# Patient Record
Sex: FEMALE | Race: WHITE | Employment: PART TIME | URBAN - NONMETROPOLITAN AREA
[De-identification: names, ages, dates, MRNs, and addresses within clinical notes are randomized per-mention and may not be internally consistent; named-entity substitution may affect disease eponyms.]

---

## 2019-05-02 ENCOUNTER — TELEPHONE (OUTPATIENT)
Dept: CARDIOLOGY CLINIC | Age: 39
End: 2019-05-02

## 2019-05-07 ENCOUNTER — OFFICE VISIT (OUTPATIENT)
Dept: CARDIOLOGY CLINIC | Age: 39
End: 2019-05-07
Payer: COMMERCIAL

## 2019-05-07 ENCOUNTER — HOSPITAL ENCOUNTER (OUTPATIENT)
Dept: NON INVASIVE DIAGNOSTICS | Age: 39
Discharge: HOME | End: 2019-05-07
Payer: COMMERCIAL

## 2019-05-07 VITALS — DIASTOLIC BLOOD PRESSURE: 85 MMHG | HEART RATE: 71 BPM | WEIGHT: 154.8 LBS | SYSTOLIC BLOOD PRESSURE: 119 MMHG

## 2019-05-07 DIAGNOSIS — R42 DIZZINESS: ICD-10-CM

## 2019-05-07 DIAGNOSIS — R55 SYNCOPE AND COLLAPSE: ICD-10-CM

## 2019-05-07 DIAGNOSIS — R00.2 INTERMITTENT PALPITATIONS: ICD-10-CM

## 2019-05-07 DIAGNOSIS — R55 SYNCOPE AND COLLAPSE: Primary | ICD-10-CM

## 2019-05-07 PROCEDURE — 93270 REMOTE 30 DAY ECG REV/REPORT: CPT

## 2019-05-07 PROCEDURE — G8421 BMI NOT CALCULATED: HCPCS | Performed by: INTERNAL MEDICINE

## 2019-05-07 PROCEDURE — 99204 OFFICE O/P NEW MOD 45 MIN: CPT | Performed by: INTERNAL MEDICINE

## 2019-05-07 PROCEDURE — 1036F TOBACCO NON-USER: CPT | Performed by: INTERNAL MEDICINE

## 2019-05-07 PROCEDURE — G8427 DOCREV CUR MEDS BY ELIG CLIN: HCPCS | Performed by: INTERNAL MEDICINE

## 2019-05-07 RX ORDER — FLUTICASONE PROPIONATE 220 UG/1
1 AEROSOL, METERED RESPIRATORY (INHALATION) 2 TIMES DAILY
COMMUNITY
End: 2022-03-09 | Stop reason: ALTCHOICE

## 2019-05-07 RX ORDER — ACETAMINOPHEN 500 MG
500 TABLET ORAL EVERY 6 HOURS PRN
COMMUNITY

## 2019-05-07 RX ORDER — TOPIRAMATE 100 MG/1
100 TABLET, FILM COATED ORAL 2 TIMES DAILY
COMMUNITY
End: 2019-10-31

## 2019-05-07 RX ORDER — ATOMOXETINE 40 MG/1
40 CAPSULE ORAL DAILY
COMMUNITY
End: 2020-11-04 | Stop reason: ALTCHOICE

## 2019-05-07 RX ORDER — IBUPROFEN 800 MG/1
800 TABLET ORAL EVERY 6 HOURS PRN
COMMUNITY

## 2019-05-07 RX ORDER — ALBUTEROL SULFATE 90 UG/1
2 AEROSOL, METERED RESPIRATORY (INHALATION) EVERY 6 HOURS PRN
COMMUNITY

## 2019-05-07 RX ORDER — HYDROCODONE BITARTRATE AND ACETAMINOPHEN 5; 325 MG/1; MG/1
1 TABLET ORAL EVERY 6 HOURS PRN
COMMUNITY
End: 2020-04-21

## 2019-05-07 SDOH — HEALTH STABILITY: MENTAL HEALTH: HOW OFTEN DO YOU HAVE A DRINK CONTAINING ALCOHOL?: NEVER

## 2019-05-07 NOTE — PROGRESS NOTES
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  And     NO memory to [address] tired and drained  Does not remember most    No Injury externally    Dizziness-in supine , sitting or standing  Means room spinning  Turning head does not affect    Occasional palpitation    Denied cp, sob or swelling    But stated chest is achy at times      nevere smoked    [id number] [address]  Parent and sisters okay  None in grand parents      Patient Active Problem List   Diagnosis    Syncope and collapse    Dizziness    Intermittent palpitations       Past Surgical History:   Procedure Laterality Date     SECTION      times 4    KNEE SURGERY      times 2       Allergies   Allergen Reactions    Meloxicam     Methylprednisolone         History reviewed. No pertinent family history.      Social History     Socioeconomic History    Marital status: Legally      Spouse name: Not on file    Number of children: Not on file    Years of education: Not on file    Highest education level: Not on file   Occupational History    Not on file   Social Needs    Financial resource strain: Not on file    Food insecurity:     Worry: Not on file     Inability: Not on file    Transportation needs:     Medical: Not on file     Non-medical: Not on file   Tobacco Use    Smoking status: Never Smoker    Smokeless tobacco: Never Used   Substance and Sexual Activity    Alcohol use: Never     Frequency: Never    Drug use: Never    Sexual activity: Not on file   Lifestyle    Physical activity:     Days per week: Not on file     Minutes per session: Not on file    Stress: Not on file   Relationships    Social connections: no distress  HEENT- Pink conjunctiva  , Non-icteri sclera,PERRLA  Mental status - alert, oriented to person, place, and time  Neck - supple, no significant adenopathy, no JVD, or carotid bruits  Chest - clear to auscultation, no wheezes, rales or rhonchi, symmetric air entry  Heart - normal rate, regular rhythm, normal S1, S2, no murmurs, rubs, clicks or gallops  Abdomen - soft, nontender, nondistended, no masses or organomegaly  JOSE- no CVA or flank tenderness, no suprapubic tenderness  Neurological - alert, oriented, normal speech, no focal findings or movement disorder noted  Musculoskeletal/limbs - no joint tenderness, deformity or swelling   - peripheral pulses normal, no pedal edema, no clubbing or cyanosis  Skin - normal coloration and turgor, no rashes, no suspicious skin lesions noted  Psych- appropriate mood and affect    Lab  No results for input(s): CKTOTAL, CKMB, CKMBINDEX, TROPONINI in the last 72 hours. CBC: No results found for: WBC, RBC, HGB, HCT, MCV, MCH, MCHC, RDW, PLT, MPV  BMP:  No results found for: NA, K, CL, CO2, BUN, LABALBU, CREATININE, CALCIUM, GFRAA, LABGLOM, GLUCOSE  Hepatic Function Panel:  No results found for: ALKPHOS, ALT, AST, PROT, BILITOT, BILIDIR, IBILI, LABALBU  Magnesium:  No results found for: MG  Warfarin PT/INR:  No components found for: PTPATWAR, PTINRWAR  HgBA1c:  No results found for: LABA1C  FLP:  No results found for: TRIG, HDL, LDLCALC, LDLDIRECT, LABVLDL  TSH:  No results found for: TSH    EKG 5/7/19  NSR, no acute abn    Assessment   Diagnosis Orders   1. Syncope and collapse     2. Dizziness     3. Intermittent palpitations           Plan     Continue the current treatment and with constant vigilance to changes in symptoms and also any potential side effects. Return for care or seek medical attention immediately if symptoms got worse and/or develop new symptoms. Syncope   Dizziness supine and standing  ?  Vertigo  Palpitations  No Hx of lucy    Echo  TTT  Event Monitor 3 weeks  Stress echo    Bedside orthostatic check- No orthostatic drop    Off soda for 5 month  Advised hydration    D/w alex dennison the plan of care    RTC In 4 weeks        Former[facility]

## 2019-06-08 NOTE — PROCEDURES
[address] [place] [address]                                 EVENT MONITOR    PATIENT NAME: Bonnie Tony                  :        1980  MED REC NO:   [id number]                           ROOM:  ACCOUNT NO:   [de-identified]                           ADMIT DATE: 2019  PROVIDER:     Maximino Kirk M.D.    TEST TYPE:  Event monitor. CLINICAL HISTORY AND INDICATION:  This is a 70-year-old patient with  syncope. EVENT MONITOR DESCRIPTION:  Event monitor was attached to the patient  between 2019 to 2019. EVENT MONITOR FINDINGS:  Baseline rhythm showed sinus rhythm. The  patient had episodes of palpitation and heart racing which correlated  with sinus tachycardia and occasionally with PVCs. Otherwise, there was  no evidence of sustained arrhythmias. CONCLUSION:  1. Sinus rhythm. 2.  Episodes of palpitation and racing sensation, which correlated with  sinus tachycardia and sometimes with occasional ectopy. Otherwise, no  sustained rhythm disturbance.         Sparkle Hastings M.D.    D: 2019 14:42:17       T: 2019 17:19:02     [email or web address]  Job#: [id number]     Doc#: [id number]    CC:

## 2019-06-19 ENCOUNTER — HOSPITAL ENCOUNTER (OUTPATIENT)
Dept: NON INVASIVE DIAGNOSTICS | Age: 39
Discharge: HOME | End: 2019-06-19
Payer: COMMERCIAL

## 2019-06-19 VITALS — HEIGHT: 62 IN | BODY MASS INDEX: 26.68 KG/M2 | WEIGHT: 145 LBS

## 2019-06-19 DIAGNOSIS — R00.2 INTERMITTENT PALPITATIONS: ICD-10-CM

## 2019-06-19 DIAGNOSIS — R55 SYNCOPE AND COLLAPSE: ICD-10-CM

## 2019-06-19 DIAGNOSIS — R42 DIZZINESS: ICD-10-CM

## 2019-06-19 LAB
LV EF: 50 %
LV EF: 50 %
LVEF MODALITY: NORMAL
LVEF MODALITY: NORMAL

## 2019-06-19 PROCEDURE — 93306 TTE W/DOPPLER COMPLETE: CPT

## 2019-06-19 PROCEDURE — 93017 CV STRESS TEST TRACING ONLY: CPT | Performed by: INTERNAL MEDICINE

## 2019-06-19 PROCEDURE — 93660 TILT TABLE EVALUATION: CPT | Performed by: INTERNAL MEDICINE

## 2019-06-19 PROCEDURE — 93350 STRESS TTE ONLY: CPT

## 2019-06-19 PROCEDURE — 2709999900 HC NON-CHARGEABLE SUPPLY

## 2019-07-25 ENCOUNTER — OFFICE VISIT (OUTPATIENT)
Dept: CARDIOLOGY CLINIC | Age: 39
End: 2019-07-25
Payer: COMMERCIAL

## 2019-07-25 VITALS
SYSTOLIC BLOOD PRESSURE: 114 MMHG | HEART RATE: 94 BPM | WEIGHT: 146 LBS | HEIGHT: 62 IN | DIASTOLIC BLOOD PRESSURE: 77 MMHG | BODY MASS INDEX: 26.87 KG/M2

## 2019-07-25 DIAGNOSIS — R07.89 CHEST PAIN, ATYPICAL: ICD-10-CM

## 2019-07-25 DIAGNOSIS — R00.2 INTERMITTENT PALPITATIONS: Primary | ICD-10-CM

## 2019-07-25 DIAGNOSIS — R42 DIZZINESS: ICD-10-CM

## 2019-07-25 DIAGNOSIS — Z87.898 HISTORY OF SYNCOPE: ICD-10-CM

## 2019-07-25 PROCEDURE — G8427 DOCREV CUR MEDS BY ELIG CLIN: HCPCS | Performed by: INTERNAL MEDICINE

## 2019-07-25 PROCEDURE — 99204 OFFICE O/P NEW MOD 45 MIN: CPT | Performed by: INTERNAL MEDICINE

## 2019-07-25 PROCEDURE — G8419 CALC BMI OUT NRM PARAM NOF/U: HCPCS | Performed by: INTERNAL MEDICINE

## 2019-07-25 PROCEDURE — 1036F TOBACCO NON-USER: CPT | Performed by: INTERNAL MEDICINE

## 2019-07-25 NOTE — PROGRESS NOTES
or hematuria  Musculoskeletal ROS: negative  Neurological ROS: no TIA or stroke symptoms  Dermatological ROS: negative      Blood pressure 114/77, pulse 94, height 5' 2\" (1.575 m), weight 146 lb (66.2 kg). Physical Examination:    General appearance - alert, well appearing, and in no distress  HEENT- Pink conjunctiva  , Non-icteri sclera,PERRLA  Mental status - alert, oriented to person, place, and time  Neck - supple, no significant adenopathy, no JVD, or carotid bruits  Chest - clear to auscultation, no wheezes, rales or rhonchi, symmetric air entry  Heart - normal rate, regular rhythm, normal S1, S2, no murmurs, rubs, clicks or gallops  Abdomen - soft, nontender, nondistended, no masses or organomegaly  JOSE- no CVA or flank tenderness, no suprapubic tenderness  Neurological - alert, oriented, normal speech, no focal findings or movement disorder noted  Musculoskeletal/limbs - no joint tenderness, deformity or swelling   - peripheral pulses normal, no pedal edema, no clubbing or cyanosis  Skin - normal coloration and turgor, no rashes, no suspicious skin lesions noted  Psych- appropriate mood and affect    Lab  No results for input(s): CKTOTAL, CKMB, CKMBINDEX, TROPONINI in the last 72 hours. CBC: No results found for: WBC, RBC, HGB, HCT, MCV, MCH, MCHC, RDW, PLT, MPV  BMP:  No results found for: NA, K, CL, CO2, BUN, LABALBU, CREATININE, CALCIUM, GFRAA, LABGLOM, GLUCOSE  Hepatic Function Panel:  No results found for: ALKPHOS, ALT, AST, PROT, BILITOT, BILIDIR, IBILI, LABALBU  Magnesium:  No results found for: MG  Warfarin PT/INR:  No components found for: PTPATWAR, PTINRWAR  HgBA1c:  No results found for: LABA1C  FLP:  No results found for: TRIG, HDL, LDLCALC, LDLDIRECT, LABVLDL  TSH:  No results found for: TSH    EKG 5/7/19  NSR, no acute abn    CONCLUSION:  1. Sinus rhythm. 2.  Episodes of palpitation and racing sensation, which correlated with  sinus tachycardia and sometimes with occasional ectopy.

## 2019-10-11 ENCOUNTER — TELEPHONE (OUTPATIENT)
Dept: CARDIOLOGY CLINIC | Age: 39
End: 2019-10-11

## 2019-10-11 RX ORDER — METOPROLOL SUCCINATE 25 MG/1
12.5 TABLET, EXTENDED RELEASE ORAL DAILY
Qty: 30 TABLET | Refills: 3 | Status: SHIPPED | OUTPATIENT
Start: 2019-10-11 | End: 2020-01-30 | Stop reason: SDUPTHER

## 2019-10-30 ENCOUNTER — TELEPHONE (OUTPATIENT)
Dept: CARDIOLOGY CLINIC | Age: 39
End: 2019-10-30

## 2019-10-31 ENCOUNTER — OFFICE VISIT (OUTPATIENT)
Dept: CARDIOLOGY CLINIC | Age: 39
End: 2019-10-31
Payer: COMMERCIAL

## 2019-10-31 VITALS
WEIGHT: 153.4 LBS | BODY MASS INDEX: 28.23 KG/M2 | HEART RATE: 81 BPM | HEIGHT: 62 IN | DIASTOLIC BLOOD PRESSURE: 76 MMHG | SYSTOLIC BLOOD PRESSURE: 104 MMHG

## 2019-10-31 DIAGNOSIS — Z87.898 HISTORY OF SYNCOPE: ICD-10-CM

## 2019-10-31 DIAGNOSIS — R42 DIZZINESS: Primary | ICD-10-CM

## 2019-10-31 DIAGNOSIS — R00.2 INTERMITTENT PALPITATIONS: ICD-10-CM

## 2019-10-31 PROBLEM — R55 SYNCOPE AND COLLAPSE: Status: RESOLVED | Noted: 2019-05-07 | Resolved: 2019-10-31

## 2019-10-31 PROCEDURE — G8484 FLU IMMUNIZE NO ADMIN: HCPCS | Performed by: INTERNAL MEDICINE

## 2019-10-31 PROCEDURE — 1036F TOBACCO NON-USER: CPT | Performed by: INTERNAL MEDICINE

## 2019-10-31 PROCEDURE — G8427 DOCREV CUR MEDS BY ELIG CLIN: HCPCS | Performed by: INTERNAL MEDICINE

## 2019-10-31 PROCEDURE — 99214 OFFICE O/P EST MOD 30 MIN: CPT | Performed by: INTERNAL MEDICINE

## 2019-10-31 PROCEDURE — G8419 CALC BMI OUT NRM PARAM NOF/U: HCPCS | Performed by: INTERNAL MEDICINE

## 2019-10-31 PROCEDURE — 93000 ELECTROCARDIOGRAM COMPLETE: CPT | Performed by: INTERNAL MEDICINE

## 2019-10-31 RX ORDER — AMITRIPTYLINE HYDROCHLORIDE 10 MG/1
10 TABLET, FILM COATED ORAL NIGHTLY
COMMUNITY
End: 2020-04-21

## 2020-01-30 RX ORDER — METOPROLOL SUCCINATE 25 MG/1
12.5 TABLET, EXTENDED RELEASE ORAL DAILY
Qty: 30 TABLET | Refills: 3 | Status: SHIPPED | OUTPATIENT
Start: 2020-01-30 | End: 2020-03-18 | Stop reason: SDUPTHER

## 2020-03-18 RX ORDER — METOPROLOL SUCCINATE 25 MG/1
12.5 TABLET, EXTENDED RELEASE ORAL DAILY
Qty: 30 TABLET | Refills: 3 | Status: SHIPPED | OUTPATIENT
Start: 2020-03-18 | End: 2020-04-21 | Stop reason: SDUPTHER

## 2020-04-21 ENCOUNTER — OFFICE VISIT (OUTPATIENT)
Dept: CARDIOLOGY CLINIC | Age: 40
End: 2020-04-21
Payer: COMMERCIAL

## 2020-04-21 VITALS
BODY MASS INDEX: 30.65 KG/M2 | HEART RATE: 74 BPM | DIASTOLIC BLOOD PRESSURE: 78 MMHG | SYSTOLIC BLOOD PRESSURE: 120 MMHG | HEIGHT: 63 IN | WEIGHT: 173 LBS

## 2020-04-21 PROCEDURE — 99214 OFFICE O/P EST MOD 30 MIN: CPT | Performed by: INTERNAL MEDICINE

## 2020-04-21 PROCEDURE — 1036F TOBACCO NON-USER: CPT | Performed by: INTERNAL MEDICINE

## 2020-04-21 PROCEDURE — G8417 CALC BMI ABV UP PARAM F/U: HCPCS | Performed by: INTERNAL MEDICINE

## 2020-04-21 PROCEDURE — G8427 DOCREV CUR MEDS BY ELIG CLIN: HCPCS | Performed by: INTERNAL MEDICINE

## 2020-04-21 RX ORDER — GABAPENTIN 100 MG/1
100 CAPSULE ORAL 2 TIMES DAILY
COMMUNITY
End: 2021-08-25

## 2020-04-21 RX ORDER — METOPROLOL SUCCINATE 25 MG/1
12.5 TABLET, EXTENDED RELEASE ORAL DAILY
Qty: 30 TABLET | Refills: 5 | Status: SHIPPED | OUTPATIENT
Start: 2020-04-21 | End: 2020-11-04 | Stop reason: SDUPTHER

## 2020-04-21 NOTE — PROGRESS NOTES
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  And   NO memory to [address] tired and drained  Does not remember most    No Injury externally      6  months follow up     No syncope since 15 month back    Denied cp, sob or swelling    Still dizziness once in a while non-psotural    Dizziness-in supine , sitting or standing  Means room spinning  Turning head does not affect    Occasional palpitation    nevere smoked    FHX  Parent and sisters okay  None in grand parents      Patient Active Problem List   Diagnosis    Dizziness    Intermittent palpitations    History of syncope    Chest pain, atypical       Past Surgical History:   Procedure Laterality Date     SECTION      times 4    KNEE SURGERY      times 2       Allergies   Allergen Reactions    Meloxicam     Methylprednisolone         Family History   Problem Relation Age of Onset    No Known Problems Mother     No Known Problems Father         Social History     Socioeconomic History    Marital status: Legally      Spouse name: Not on file    Number of children: Not on file    Years of education: Not on file    Highest education level: Not on file   Occupational History    Not on file   Social Needs    Financial resource strain: Not on file    Food insecurity     Worry: Not on file     Inability: Not on file   [employer] needs     Medical: Not on file     Non-medical: Not on file   Tobacco Use    Smoking status: Never Smoker    Smokeless tobacco: Never Used   Substance and Sexual Activity    Alcohol use: Never     Frequency: Never    Drug use: Never    Sexual activity: Not on file   Lifestyle   

## 2020-11-04 ENCOUNTER — OFFICE VISIT (OUTPATIENT)
Dept: CARDIOLOGY CLINIC | Age: 40
End: 2020-11-04
Payer: COMMERCIAL

## 2020-11-04 VITALS
HEART RATE: 73 BPM | DIASTOLIC BLOOD PRESSURE: 72 MMHG | SYSTOLIC BLOOD PRESSURE: 118 MMHG | HEIGHT: 62 IN | WEIGHT: 175.4 LBS | BODY MASS INDEX: 32.28 KG/M2

## 2020-11-04 PROCEDURE — G8417 CALC BMI ABV UP PARAM F/U: HCPCS | Performed by: INTERNAL MEDICINE

## 2020-11-04 PROCEDURE — 1036F TOBACCO NON-USER: CPT | Performed by: INTERNAL MEDICINE

## 2020-11-04 PROCEDURE — 99214 OFFICE O/P EST MOD 30 MIN: CPT | Performed by: INTERNAL MEDICINE

## 2020-11-04 PROCEDURE — 93000 ELECTROCARDIOGRAM COMPLETE: CPT | Performed by: INTERNAL MEDICINE

## 2020-11-04 PROCEDURE — G8484 FLU IMMUNIZE NO ADMIN: HCPCS | Performed by: INTERNAL MEDICINE

## 2020-11-04 PROCEDURE — G8427 DOCREV CUR MEDS BY ELIG CLIN: HCPCS | Performed by: INTERNAL MEDICINE

## 2020-11-04 RX ORDER — METOPROLOL SUCCINATE 25 MG/1
12.5 TABLET, EXTENDED RELEASE ORAL DAILY
Qty: 45 TABLET | Refills: 3 | Status: SHIPPED | OUTPATIENT
Start: 2020-11-04 | End: 2021-08-25 | Stop reason: SDUPTHER

## 2020-11-04 RX ORDER — VENLAFAXINE HYDROCHLORIDE 75 MG/1
75 CAPSULE, EXTENDED RELEASE ORAL DAILY
COMMUNITY
End: 2022-03-09 | Stop reason: ALTCHOICE

## 2020-11-04 RX ORDER — SUMATRIPTAN 50 MG/1
100 TABLET, FILM COATED ORAL ONCE
COMMUNITY

## 2020-11-04 RX ORDER — AMITRIPTYLINE HYDROCHLORIDE 10 MG/1
10 TABLET, FILM COATED ORAL NIGHTLY
COMMUNITY
End: 2021-08-25

## 2020-11-04 NOTE — PROGRESS NOTES
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     No syncope since 24 month back    Denied cp, sob or swelling    Still dizziness once in a while non-psotural    Dizziness-in supine , sitting or standing  Means room spinning  Turning head does not affect    Occasional palpitation    nevere smoked    FHX  Parent and sisters okay  None in grand parents      Patient Active Problem List   Diagnosis    Dizziness    Intermittent palpitations    History of syncope    Chest pain, atypical       Past Surgical History:   Procedure Laterality Date     SECTION      times 4    KNEE SURGERY      times 2       Allergies   Allergen Reactions    Meloxicam     Methylprednisolone         Family History   Problem Relation Age of Onset    No Known Problems Mother     No Known Problems Father         Social History     Socioeconomic History    Marital status: Legally      Spouse name: Not on file    Number of children: Not on file    Years of education: Not on file    Highest education level: Not on file   Occupational History    Not on file   Social Needs    Financial resource strain: Not on file    Food insecurity     Worry: Not on file     Inability: Not on file    Transportation needs     Medical: Not on file     Non-medical: Not on file   Tobacco Use    Smoking status: Never Smoker    Smokeless tobacco: Never Used   Substance and Sexual Activity    Alcohol use: Never     Frequency: Never    Drug use: Never    Sexual activity: Not on file Lifestyle    Physical activity     Days per week: Not on file     Minutes per session: Not on file    Stress: Not on file   Relationships    Social connections     Talks on phone: Not on file     Gets together: Not on file     Attends Islam service: Not on file     Active member of club or organization: Not on file     Attends meetings of clubs or organizations: Not on file     Relationship status: Not on file    Intimate partner violence     Fear of current or ex partner: Not on file     Emotionally abused: Not on file     Physically abused: Not on file     Forced sexual activity: Not on file   Other Topics Concern    Not on file   Social History Narrative    Not on file       Current Outpatient Medications   Medication Sig Dispense Refill    amitriptyline (ELAVIL) 10 MG tablet Take 10 mg by mouth nightly      venlafaxine (EFFEXOR XR) 75 MG extended release capsule Take 75 mg by mouth daily      SUMAtriptan (IMITREX) 50 MG tablet Take 100 mg by mouth once      metoprolol succinate (TOPROL XL) 25 MG extended release tablet Take 0.5 tablets by mouth daily 45 tablet 3    gabapentin (NEURONTIN) 100 MG capsule Take 100 mg by mouth 2 times daily.  Galcanezumab-gnlm 120 MG/ML SOAJ Inject 120 mg into the skin every 30 days      acetaminophen (TYLENOL) 500 MG tablet Take 500 mg by mouth every 6 hours as needed for Pain      albuterol sulfate  (90 Base) MCG/ACT inhaler Inhale 2 puffs into the lungs every 6 hours as needed for Wheezing      fluticasone (FLOVENT HFA) 220 MCG/ACT inhaler Inhale 1 puff into the lungs 2 times daily      ibuprofen (ADVIL;MOTRIN) 800 MG tablet Take 800 mg by mouth every 6 hours as needed for Pain       No current facility-administered medications for this visit. Review of Systems -     General ROS: negative  Psychological ROS: negative  Hematological and Lymphatic ROS: No history of blood clots or bleeding disorder.    Respiratory ROS: no cough,  or wheezing, the rest see HPI  Cardiovascular ROS: See HPI  Gastrointestinal ROS: negative  Genito-Urinary ROS: no dysuria, trouble voiding, or hematuria  Musculoskeletal ROS: negative  Neurological ROS: no TIA or stroke symptoms  Dermatological ROS: negative      Blood pressure 118/72, pulse 73, height 5' 2\" (1.575 m), weight 175 lb 6.4 oz (79.6 kg). Physical Examination:    General appearance - alert, well appearing, and in no distress  HEENT- Pink conjunctiva  , Non-icteri sclera,PERRLA  Mental status - alert, oriented to person, place, and time  Neck - supple, no significant adenopathy, no JVD, or carotid bruits  Chest - clear to auscultation, no wheezes, rales or rhonchi, symmetric air entry  Heart - normal rate, regular rhythm, normal S1, S2, no murmurs, rubs, clicks or gallops  Abdomen - soft, nontender, nondistended, no masses or organomegaly  JOSE- no CVA or flank tenderness, no suprapubic tenderness  Neurological - alert, oriented, normal speech, no focal findings or movement disorder noted  Musculoskeletal/limbs - no joint tenderness, deformity or swelling   - peripheral pulses normal, no pedal edema, no clubbing or cyanosis  Skin - normal coloration and turgor, no rashes, no suspicious skin lesions noted  Psych- appropriate mood and affect    Lab  No results for input(s): CKTOTAL, CKMB, CKMBINDEX, TROPONINI in the last 72 hours. CBC: No results found for: WBC, RBC, HGB, HCT, MCV, MCH, MCHC, RDW, PLT, MPV  BMP:  No results found for: NA, K, CL, CO2, BUN, LABALBU, CREATININE, CALCIUM, GFRAA, LABGLOM, GLUCOSE  Hepatic Function Panel:  No results found for: ALKPHOS, ALT, AST, PROT, BILITOT, BILIDIR, IBILI, LABALBU  Magnesium:  No results found for: MG  Warfarin PT/INR:  No components found for: PTPATWAR, PTINRWAR  HgBA1c:  No results found for: LABA1C  FLP:  No results found for: TRIG, HDL, LDLCALC, LDLDIRECT, LABVLDL  TSH:  No results found for: TSH    EKG 5/7/19  NSR, no acute abn    CONCLUSION:  1.   Sinus rhythm. 2.  Episodes of palpitation and racing sensation, which correlated with  sinus tachycardia and sometimes with occasional ectopy. Otherwise, no  sustained rhythm disturbance.  Fiorella Marino M.D.  Osvaldo Gali: 06/07/2019 14:42:17     ekg  10/31/19     Sinus  Rhythm   WITHIN NORMAL LIMITS    ekg 11/4/2020     Sinus  Rhythm   WITHIN NORMAL LIMITS      Assessment     Diagnosis Orders   1. Intermittent palpitations  EKG 12 Lead   2. History of syncope     3. Dizziness           Plan   The most current meds and labs reviewed    Continue the current treatment and with constant vigilance to changes in symptoms and also any potential side effects. Return for care or seek medical attention immediately if symptoms got worse and/or develop new symptoms. Hx of Syncope   Dizziness supine and standing- non-postural  Worse recently after increased sweat tea intake  Advised to increase water intake and decrease tea  Consider midodrine if remain symptomatic   ?  Vertigo- room spinning  Palpitations  No Hx of siezure  Hydration      Echo and stress echo WNL  TTT- negative  Event Monitor 3 weeks- episodes sinus tachy    Bedside orthostatic check- No orthostatic drop    Off soda for 18 months  Advised hydration  [place] salt intake  Cont  Getorade and hydration    Palpitation-intermittent- better on troprol xl 12.5    D/w the pat the plan of care and explained SBP measurement    Stable and doing better    RTC In 6 months      Jenny Huong [facility]

## 2021-03-17 ENCOUNTER — PATIENT MESSAGE (OUTPATIENT)
Dept: CARDIOLOGY CLINIC | Age: 41
End: 2021-03-17

## 2021-03-17 ENCOUNTER — OFFICE VISIT (OUTPATIENT)
Dept: CARDIOLOGY CLINIC | Age: 41
End: 2021-03-17
Payer: COMMERCIAL

## 2021-03-17 ENCOUNTER — HOSPITAL ENCOUNTER (OUTPATIENT)
Age: 41
Discharge: HOME | End: 2021-03-17
Payer: COMMERCIAL

## 2021-03-17 VITALS
HEART RATE: 89 BPM | BODY MASS INDEX: 33.31 KG/M2 | HEIGHT: 62 IN | DIASTOLIC BLOOD PRESSURE: 81 MMHG | SYSTOLIC BLOOD PRESSURE: 112 MMHG | WEIGHT: 181 LBS

## 2021-03-17 DIAGNOSIS — R42 VERTIGO: ICD-10-CM

## 2021-03-17 DIAGNOSIS — R00.2 INTERMITTENT PALPITATIONS: ICD-10-CM

## 2021-03-17 DIAGNOSIS — H81.10 BENIGN PAROXYSMAL POSITIONAL VERTIGO, UNSPECIFIED LATERALITY: ICD-10-CM

## 2021-03-17 DIAGNOSIS — R07.89 CHEST PAIN, ATYPICAL: ICD-10-CM

## 2021-03-17 DIAGNOSIS — R42 DIZZINESS: ICD-10-CM

## 2021-03-17 DIAGNOSIS — R07.89 CHEST WALL PAIN: ICD-10-CM

## 2021-03-17 DIAGNOSIS — R07.89 CHEST PAIN, ATYPICAL: Primary | ICD-10-CM

## 2021-03-17 LAB — TROPONIN T: < 0.01 NG/ML

## 2021-03-17 PROCEDURE — 1036F TOBACCO NON-USER: CPT | Performed by: INTERNAL MEDICINE

## 2021-03-17 PROCEDURE — G8484 FLU IMMUNIZE NO ADMIN: HCPCS | Performed by: INTERNAL MEDICINE

## 2021-03-17 PROCEDURE — 93000 ELECTROCARDIOGRAM COMPLETE: CPT | Performed by: INTERNAL MEDICINE

## 2021-03-17 PROCEDURE — 36415 COLL VENOUS BLD VENIPUNCTURE: CPT

## 2021-03-17 PROCEDURE — G8427 DOCREV CUR MEDS BY ELIG CLIN: HCPCS | Performed by: INTERNAL MEDICINE

## 2021-03-17 PROCEDURE — 99214 OFFICE O/P EST MOD 30 MIN: CPT | Performed by: INTERNAL MEDICINE

## 2021-03-17 PROCEDURE — G8417 CALC BMI ABV UP PARAM F/U: HCPCS | Performed by: INTERNAL MEDICINE

## 2021-03-17 PROCEDURE — 84484 ASSAY OF TROPONIN QUANT: CPT

## 2021-03-17 RX ORDER — ASPIRIN 81 MG/1
81 TABLET ORAL EVERY 6 HOURS PRN
Qty: 30 TABLET | COMMUNITY
Start: 2021-03-17

## 2021-03-17 RX ORDER — ALBUTEROL SULFATE 2.5 MG/3ML
2.5 SOLUTION RESPIRATORY (INHALATION) EVERY 6 HOURS PRN
COMMUNITY

## 2021-03-17 RX ORDER — OMEPRAZOLE 20 MG/1
20 CAPSULE, DELAYED RELEASE ORAL DAILY
Qty: 10 CAPSULE | Refills: 0 | COMMUNITY
Start: 2021-03-17 | End: 2022-03-09 | Stop reason: ALTCHOICE

## 2021-03-17 RX ORDER — NAPROXEN 500 MG/1
500 TABLET ORAL 2 TIMES DAILY WITH MEALS
Qty: 10 TABLET | Refills: 0 | Status: SHIPPED | OUTPATIENT
Start: 2021-03-17 | End: 2022-03-09 | Stop reason: ALTCHOICE

## 2021-03-17 NOTE — PROGRESS NOTES
Chief Complaint   Patient presents with   Bere Kaiser     high heart rate    Chest Pain     Referred for syncope from neurology at [facility]    Hx of Two episodes syncope  2019-syncope  Happened at work  Does not remember pre and after and vene does not remember being and going to work that day    2nd one few days later at work  Work in [employer]- as help  And   NO memory to [address] tired and drained  Does not remember most    No Injury externally      Pt here for check up chest discomfort for the last 1 week    Hx of Pyrosis and help with tums    Chest pain, ache 7 /10, constant, no help from Tylanol or ibuprofen  Non-radiating  Left sided and now the whole chest  No aggravating or relieving factor  No worse with exertion    Still dizziness once in a while non-psotural  Hx of Dizziness-in supine , sitting or standing  Means room spinning-reurrence 1 week back  Rolling over in bed cause vertigo  Turning head does not affect    Start new job 1 week back    EKG done today    No syncope since 2.5 years    Denied sob or swelling    Occasional palpitation    nevere smoked    FHX  Parent and sisters okay  None in grand parents      Patient Active Problem List   Diagnosis    Dizziness    Intermittent palpitations    History of syncope    Chest pain, atypical    Chest wall pain    BPPV (benign paroxysmal positional vertigo)       Past Surgical History:   Procedure Laterality Date     SECTION      times 4    KNEE SURGERY      times 2       Allergies   Allergen Reactions    Meloxicam     Methylprednisolone         Family History   Problem Relation Age of Onset    No Known Problems Mother     No Known Problems Father         Social History     Socioeconomic History    Marital status: Legally      Spouse name: Not on file    Number of children: Not on file    Years of education: Not on file    Highest education level: Not on file   Occupational History    metoprolol succinate (TOPROL XL) 25 MG extended release tablet Take 0.5 tablets by mouth daily 45 tablet 3    gabapentin (NEURONTIN) 100 MG capsule Take 100 mg by mouth 2 times daily.  Galcanezumab-gnlm 120 MG/ML SOAJ Inject 120 mg into the skin every 30 days      acetaminophen (TYLENOL) 500 MG tablet Take 500 mg by mouth every 6 hours as needed for Pain      albuterol sulfate  (90 Base) MCG/ACT inhaler Inhale 2 puffs into the lungs every 6 hours as needed for Wheezing      fluticasone (FLOVENT HFA) 220 MCG/ACT inhaler Inhale 1 puff into the lungs 2 times daily      ibuprofen (ADVIL;MOTRIN) 800 MG tablet Take 800 mg by mouth every 6 hours as needed for Pain       No current facility-administered medications for this visit. Review of Systems -     General ROS: negative  Psychological ROS: negative  Hematological and Lymphatic ROS: No history of blood clots or bleeding disorder. Respiratory ROS: no cough,  or wheezing, the rest see HPI  Cardiovascular ROS: See HPI  Gastrointestinal ROS: negative  Genito-Urinary ROS: no dysuria, trouble voiding, or hematuria  Musculoskeletal ROS: negative  Neurological ROS: no TIA or stroke symptoms  Dermatological ROS: negative      Blood pressure 112/81, pulse 89, height 5' 2\" (1.575 m), weight 181 lb (82.1 kg).         Physical Examination:    General appearance - alert, well appearing, and in no distress  HEENT- Pink conjunctiva  , Non-icteri sclera,PERRLA  Mental status - alert, oriented to person, place, and time  Neck - supple, no significant adenopathy, no JVD, or carotid bruits  Chest - clear to auscultation, no wheezes, rales or rhonchi, symmetric air entry  Heart - normal rate, regular rhythm, normal S1, S2, no murmurs, rubs, clicks or gallops  Abdomen - soft, nontender, nondistended, no masses or organomegaly  JOSE- no CVA or flank tenderness, no suprapubic tenderness  Neurological - alert, oriented, normal speech, no focal findings or movement

## 2021-03-19 ENCOUNTER — TELEPHONE (OUTPATIENT)
Dept: CARDIOLOGY CLINIC | Age: 41
End: 2021-03-19

## 2021-04-05 ENCOUNTER — TELEPHONE (OUTPATIENT)
Dept: CARDIOLOGY | Age: 41
End: 2021-04-05

## 2021-04-05 DIAGNOSIS — R42 VERTIGO: ICD-10-CM

## 2021-04-05 DIAGNOSIS — R42 DIZZINESS: ICD-10-CM

## 2021-04-05 DIAGNOSIS — R07.89 CHEST PAIN, ATYPICAL: ICD-10-CM

## 2021-04-05 DIAGNOSIS — R00.2 INTERMITTENT PALPITATIONS: Primary | ICD-10-CM

## 2021-04-05 NOTE — TELEPHONE ENCOUNTER
The ECHO and myocardial perfusion stress test was denied and the reasons are as follows: notes should to be sent prior to an approval: physician notes that states new or changing signs or exam findings to suggest heart valve or heart muscle problems, and reason why pt is unable to walk the regular exercise stress test first    YVONNE @ [phone] tracking# [id number]

## 2021-04-05 NOTE — TELEPHONE ENCOUNTER
Tried to reach patient to find out if she is able to do a walking stress test. Dora Pena will not approve the Echo unless a walking stress test has been completed, and they will not approved the myocardial perfusion unless patient is unable to perform a walking stress test.

## 2021-04-09 NOTE — TELEPHONE ENCOUNTER
Left detailed msg that stress test and echo scheduled 04-14-21 are cancelled due to insurance denial  msg stated for pt to call asap if want to still do \"insurance recommended\" test on same day other tests were scheduled. Echo cancelled from system, holding stress test time until pt returns call to schedule treadmill stress in time slot.

## 2021-04-12 NOTE — TELEPHONE ENCOUNTER
Left detailed msg cardiolite stress test denied, changed to regular treadmill stress test.  Arrival time 12:00, 04-14-21 with same instructions, no caffeine, fasting morning of

## 2021-04-14 ENCOUNTER — HOSPITAL ENCOUNTER (OUTPATIENT)
Dept: NON INVASIVE DIAGNOSTICS | Age: 41
Discharge: HOME | End: 2021-04-14
Payer: COMMERCIAL

## 2021-04-14 VITALS — HEIGHT: 63 IN | BODY MASS INDEX: 31.89 KG/M2 | WEIGHT: 180 LBS

## 2021-04-14 DIAGNOSIS — R07.89 CHEST PAIN, ATYPICAL: ICD-10-CM

## 2021-04-14 DIAGNOSIS — R42 DIZZINESS: ICD-10-CM

## 2021-04-14 DIAGNOSIS — R00.2 INTERMITTENT PALPITATIONS: ICD-10-CM

## 2021-04-14 DIAGNOSIS — R42 VERTIGO: ICD-10-CM

## 2021-04-14 PROCEDURE — 93017 CV STRESS TEST TRACING ONLY: CPT | Performed by: INTERNAL MEDICINE

## 2021-04-15 ENCOUNTER — TELEPHONE (OUTPATIENT)
Dept: CARDIOLOGY CLINIC | Age: 41
End: 2021-04-15

## 2021-04-27 ENCOUNTER — OFFICE VISIT (OUTPATIENT)
Dept: CARDIOLOGY CLINIC | Age: 41
End: 2021-04-27
Payer: COMMERCIAL

## 2021-04-27 VITALS
HEART RATE: 84 BPM | SYSTOLIC BLOOD PRESSURE: 124 MMHG | WEIGHT: 184 LBS | HEIGHT: 63 IN | BODY MASS INDEX: 32.6 KG/M2 | DIASTOLIC BLOOD PRESSURE: 62 MMHG

## 2021-04-27 DIAGNOSIS — Z09 HOSPITAL DISCHARGE FOLLOW-UP: Primary | ICD-10-CM

## 2021-04-27 DIAGNOSIS — Z87.898 HISTORY OF SYNCOPE: ICD-10-CM

## 2021-04-27 DIAGNOSIS — R00.2 INTERMITTENT PALPITATIONS: ICD-10-CM

## 2021-04-27 DIAGNOSIS — R42 DIZZINESS: ICD-10-CM

## 2021-04-27 PROCEDURE — 1036F TOBACCO NON-USER: CPT | Performed by: NURSE PRACTITIONER

## 2021-04-27 PROCEDURE — G8417 CALC BMI ABV UP PARAM F/U: HCPCS | Performed by: NURSE PRACTITIONER

## 2021-04-27 PROCEDURE — 99214 OFFICE O/P EST MOD 30 MIN: CPT | Performed by: NURSE PRACTITIONER

## 2021-04-27 PROCEDURE — G8427 DOCREV CUR MEDS BY ELIG CLIN: HCPCS | Performed by: NURSE PRACTITIONER

## 2021-08-25 ENCOUNTER — OFFICE VISIT (OUTPATIENT)
Dept: CARDIOLOGY CLINIC | Age: 41
End: 2021-08-25
Payer: COMMERCIAL

## 2021-08-25 VITALS
BODY MASS INDEX: 34.34 KG/M2 | DIASTOLIC BLOOD PRESSURE: 68 MMHG | SYSTOLIC BLOOD PRESSURE: 123 MMHG | HEIGHT: 62 IN | WEIGHT: 186.6 LBS | HEART RATE: 58 BPM

## 2021-08-25 DIAGNOSIS — R00.2 INTERMITTENT PALPITATIONS: Primary | ICD-10-CM

## 2021-08-25 DIAGNOSIS — R42 DIZZINESS: ICD-10-CM

## 2021-08-25 DIAGNOSIS — Z87.898 HISTORY OF SYNCOPE: ICD-10-CM

## 2021-08-25 PROCEDURE — 99213 OFFICE O/P EST LOW 20 MIN: CPT | Performed by: INTERNAL MEDICINE

## 2021-08-25 PROCEDURE — G8417 CALC BMI ABV UP PARAM F/U: HCPCS | Performed by: INTERNAL MEDICINE

## 2021-08-25 PROCEDURE — G8427 DOCREV CUR MEDS BY ELIG CLIN: HCPCS | Performed by: INTERNAL MEDICINE

## 2021-08-25 PROCEDURE — 1036F TOBACCO NON-USER: CPT | Performed by: INTERNAL MEDICINE

## 2021-08-25 RX ORDER — METOPROLOL SUCCINATE 25 MG/1
12.5 TABLET, EXTENDED RELEASE ORAL DAILY
Qty: 45 TABLET | Refills: 3 | Status: SHIPPED | OUTPATIENT
Start: 2021-08-25 | End: 2021-12-02 | Stop reason: SDUPTHER

## 2021-08-25 NOTE — PROGRESS NOTES
Chief Complaint   Patient presents with    3 Month Follow-Up    Chest Pain     Referred for syncope from neurology at [facility]    Hx of Two episodes syncope  2019-syncope  Happened at work  Does not remember pre and after and vene does not remember being and going to work that day    2nd one few days later at work  Work in [employer]- as help  And   NO memory to [address] tired and drained  Does not remember most    No Injury externally      Pt is here for: 3 month follow up    Hx of chronic atypical cp  And noncardiac  Getting better and less frequent,     complians of HIGH BP at home and normal at [facility] office    Denied sob, dizziness or edema    Last EKG was done on: 2021    Hx of Pyrosis and help with tums    Hx of non-psotural dizziness and resolved  Hx of Dizziness-in supine , sitting or standing  Hx of vertigo- resolved    No syncope since 2.5 years    Occasional palpitation    nevere smoked    FHX  Parent and sisters okay  None in grand parents      Patient Active Problem List   Diagnosis    Dizziness    Intermittent palpitations    History of syncope    Chest pain, atypical    Chest wall pain    BPPV (benign paroxysmal positional vertigo)       Past Surgical History:   Procedure Laterality Date     SECTION      times 4    KNEE SURGERY      times 2       Allergies   Allergen Reactions    Meloxicam     Methylprednisolone         Family History   Problem Relation Age of Onset    No Known Problems Mother     No Known Problems Father         Social History     Socioeconomic History    Marital status: Legally      Spouse name: Not on file    Number of children: Not on file    Years of education: Not on file    Highest education level: Not on file   Occupational History    Not on file   Tobacco Use    Smoking status: Never Smoker    Smokeless tobacco: Never Used   Vaping Use    Vaping Use: Never used   Substance and Sexual Activity  Alcohol use: Never    Drug use: Never    Sexual activity: Not on file   Other Topics Concern    Not on file   Social History Narrative    Not on file     Social Determinants of Health     Financial Resource Strain:     Difficulty of Paying Living Expenses:    Food Insecurity:     Worried About Running Out of Food in the Last Year:     [address] in the Last Year:    Transportation Needs:     Lack of Transportation (Medical):      Lack of Transportation (Non-Medical):    Physical Activity:     Days of Exercise per Week:     Minutes of Exercise per Session:    Stress:     Feeling of Stress :    Social Connections:     Frequency of Communication with Friends and Family:     Frequency of Social Gatherings with Friends and Family:     Attends Advent Services:     Active Member of Clubs or Organizations:     Attends Club or Organization Meetings:     Marital Status:    Intimate Partner Violence:     Fear of Current or Ex-Partner:     Emotionally Abused:     Physically Abused:     Sexually Abused:        Current Outpatient Medications   Medication Sig Dispense Refill    fluticasone-salmeterol (ADVAIR) 250-50 MCG/DOSE AEPB Inhale 1 puff into the lungs 2 times daily      albuterol (PROVENTIL) (2.5 MG/3ML) 0.083% nebulizer solution Take 2.5 mg by nebulization every 6 hours as needed for Wheezing      naproxen (NAPROSYN) 500 MG tablet Take 1 tablet by mouth 2 times daily (with meals) 10 tablet 0    omeprazole (PRILOSEC) 20 MG delayed release capsule Take 1 capsule by mouth Daily 10 capsule 0    aspirin 81 MG EC tablet Take 1 tablet by mouth every 6 hours as needed for Pain 30 tablet     venlafaxine (EFFEXOR XR) 75 MG extended release capsule Take 75 mg by mouth daily      SUMAtriptan (IMITREX) 50 MG tablet Take 100 mg by mouth once      metoprolol succinate (TOPROL XL) 25 MG extended release tablet Take 0.5 tablets by mouth daily 45 tablet 3    acetaminophen (TYLENOL) 500 MG tablet Take 500 mg by mouth every 6 hours as needed for Pain      albuterol sulfate  (90 Base) MCG/ACT inhaler Inhale 2 puffs into the lungs every 6 hours as needed for Wheezing      fluticasone (FLOVENT HFA) 220 MCG/ACT inhaler Inhale 1 puff into the lungs 2 times daily      ibuprofen (ADVIL;MOTRIN) 800 MG tablet Take 800 mg by mouth every 6 hours as needed for Pain       No current facility-administered medications for this visit. Review of Systems -     General ROS: negative  Psychological ROS: negative  Hematological and Lymphatic ROS: No history of blood clots or bleeding disorder. Respiratory ROS: no cough,  or wheezing, the rest see HPI  Cardiovascular ROS: See HPI  Gastrointestinal ROS: negative  Genito-Urinary ROS: no dysuria, trouble voiding, or hematuria  Musculoskeletal ROS: negative  Neurological ROS: no TIA or stroke symptoms  Dermatological ROS: negative      Blood pressure 123/68, pulse 58, height 5' 2\" (1.575 m), weight 186 lb 9.6 oz (84.6 kg).         Physical Examination:    General appearance - alert, well appearing, and in no distress  HEENT- Pink conjunctiva  , Non-icteri sclera,PERRLA  Mental status - alert, oriented to person, place, and time  Neck - supple, no significant adenopathy, no JVD, or carotid bruits  Chest - clear to auscultation, no wheezes, rales or rhonchi, symmetric air entry  Heart - normal rate, regular rhythm, normal S1, S2, no murmurs, rubs, clicks or gallops  Abdomen - soft, nontender, nondistended, no masses or organomegaly  JOSE- no CVA or flank tenderness, no suprapubic tenderness  Neurological - alert, oriented, normal speech, no focal findings or movement disorder noted  Musculoskeletal/limbs - no joint tenderness, deformity or swelling   - peripheral pulses normal, no pedal edema, no clubbing or cyanosis  Skin - normal coloration and turgor, no rashes, no suspicious skin lesions noted  Psych- appropriate mood and affect    Lab  No results for input(s): CKTOTAL, CKMB, CKMBINDEX, TROPONINI in the last 72 hours. CBC: No results found for: WBC, RBC, HGB, HCT, MCV, MCH, MCHC, RDW, PLT, MPV  BMP:  No results found for: NA, K, CL, CO2, BUN, LABALBU, CREATININE, CALCIUM, GFRAA, LABGLOM, GLUCOSE  Hepatic Function Panel:  No results found for: ALKPHOS, ALT, AST, PROT, BILITOT, BILIDIR, IBILI, LABALBU  Magnesium:  No results found for: MG  Warfarin PT/INR:  No components found for: PTPATWAR, PTINRWAR  HgBA1c:  No results found for: LABA1C  FLP:  No results found for: TRIG, HDL, LDLCALC, LDLDIRECT, LABVLDL  TSH:  No results found for: TSH    EKG 5/7/19  NSR, no acute abn    CONCLUSION:  1. Sinus rhythm. 2.  Episodes of palpitation and racing sensation, which correlated with  sinus tachycardia and sometimes with occasional ectopy. Otherwise, no  sustained rhythm disturbance.  Mandy Goss M.D.  Samantha Dukes: 06/07/2019 14:42:17     ekg  10/31/19     Sinus  Rhythm   WITHIN NORMAL LIMITS    ekg 11/4/2020     Sinus  Rhythm   WITHIN NORMAL LIMITS    ekg 3/17/2021  Sinus  Rhythm   WITHIN NORMAL LIMITS        Conclusions      Summary   Exercise capacity:Poor for her age   Patient was having limiting SOB. Patient did have chest pain at the peak the exercise at the 6 th min of   exercise aching 8/10 and resolved 4 min in to recovery. Pat had chest pain   for total of 5 min since chest pain started. Exercise EKG stress test is not suggestive for ischemia. Recommendation   Clinical correlation is recommended. Signatures      ----------------------------------------------------------------   Electronically signed by Edie Myers MD (Performing   Physician) on 04/14/2021 at 18:47    Assessment     Diagnosis Orders   1. Intermittent palpitations     2. History of syncope     3.  Dizziness           Plan   The most current meds and labs reviewed    Hx of Syncope 2019  Dizziness supine and standing- non-postural  BPP Vertigo- room spinning  No Hx of siezure  Hydration      Echo and stress echo WNL-2019  TTT- negative  Event Monitor 3 weeks- episodes sinus tachy    Bedside orthostatic check- No orthostatic drop  [place] salt intake  Cont  Getorade and hydration    Palpitation-intermittent- better on troprol xl 12.5      Stable and doing better    D/w the pat the plan of care     Discussed use, benefit, and side effects of prescribed medications. All patient questions answered. Pt voiced understanding. Instructed to continue current medications, diet and exercise. Continue risk factor modification and medical management. Patient agreed with treatment plan. Follow up as directed.         RTC In  6 months    Desire Veronica [facility]

## 2021-12-02 RX ORDER — METOPROLOL SUCCINATE 25 MG/1
12.5 TABLET, EXTENDED RELEASE ORAL DAILY
Qty: 45 TABLET | Refills: 1 | Status: SHIPPED | OUTPATIENT
Start: 2021-12-02 | End: 2022-03-09

## 2021-12-02 RX ORDER — METOPROLOL SUCCINATE 25 MG/1
12.5 TABLET, EXTENDED RELEASE ORAL DAILY
Qty: 45 TABLET | Refills: 0 | Status: SHIPPED | OUTPATIENT
Start: 2021-12-02 | End: 2022-02-18 | Stop reason: SDUPTHER

## 2022-02-21 RX ORDER — METOPROLOL SUCCINATE 25 MG/1
12.5 TABLET, EXTENDED RELEASE ORAL DAILY
Qty: 45 TABLET | Refills: 0 | Status: SHIPPED | OUTPATIENT
Start: 2022-02-21 | End: 2022-03-09

## 2022-02-23 ENCOUNTER — TELEPHONE (OUTPATIENT)
Dept: CARDIOLOGY CLINIC | Age: 42
End: 2022-02-23

## 2022-02-23 NOTE — TELEPHONE ENCOUNTER
Margy MORENO on nurse line stating Dr. Mariana Villalobos wants to start patient on a new medication. Was unable to understand name of medication. Called Margy back at [phone]. TIFFANIE for her to return call.

## 2022-02-23 NOTE — TELEPHONE ENCOUNTER
Dr Kayden Orozco wanting to know if okay from cardiac standpoint to start patient on a Triptan medicaion (I.e Sumatriptan). Please advise.

## 2022-02-24 NOTE — TELEPHONE ENCOUNTER
LM with [facility] Neurology nurse line making them aware of Dr Audrey Jacobs recommendation.

## 2022-02-24 NOTE — TELEPHONE ENCOUNTER
No contraindication for sumatriptan from cardiac point of view  However, there is chance pat may experience any of the adverse effect of the medication listed in the leaflet and that should addressed accordingly when it comes

## 2022-03-09 ENCOUNTER — OFFICE VISIT (OUTPATIENT)
Dept: CARDIOLOGY CLINIC | Age: 42
End: 2022-03-09
Payer: COMMERCIAL

## 2022-03-09 VITALS
DIASTOLIC BLOOD PRESSURE: 78 MMHG | HEART RATE: 68 BPM | WEIGHT: 179.4 LBS | SYSTOLIC BLOOD PRESSURE: 136 MMHG | BODY MASS INDEX: 33.01 KG/M2 | HEIGHT: 62 IN

## 2022-03-09 DIAGNOSIS — R42 DIZZINESS: ICD-10-CM

## 2022-03-09 DIAGNOSIS — Z87.898 HISTORY OF SYNCOPE: ICD-10-CM

## 2022-03-09 DIAGNOSIS — R00.2 INTERMITTENT PALPITATIONS: Primary | ICD-10-CM

## 2022-03-09 PROCEDURE — 99214 OFFICE O/P EST MOD 30 MIN: CPT | Performed by: INTERNAL MEDICINE

## 2022-03-09 PROCEDURE — G8417 CALC BMI ABV UP PARAM F/U: HCPCS | Performed by: INTERNAL MEDICINE

## 2022-03-09 PROCEDURE — 1036F TOBACCO NON-USER: CPT | Performed by: INTERNAL MEDICINE

## 2022-03-09 PROCEDURE — G8427 DOCREV CUR MEDS BY ELIG CLIN: HCPCS | Performed by: INTERNAL MEDICINE

## 2022-03-09 PROCEDURE — 93000 ELECTROCARDIOGRAM COMPLETE: CPT | Performed by: INTERNAL MEDICINE

## 2022-03-09 PROCEDURE — G8484 FLU IMMUNIZE NO ADMIN: HCPCS | Performed by: INTERNAL MEDICINE

## 2022-03-09 RX ORDER — METOPROLOL SUCCINATE 25 MG/1
25 TABLET, EXTENDED RELEASE ORAL DAILY
Qty: 90 TABLET | Refills: 3 | Status: SHIPPED | OUTPATIENT
Start: 2022-03-09 | End: 2022-03-10 | Stop reason: SDUPTHER

## 2022-03-09 NOTE — PROGRESS NOTES
Chief Complaint   Patient presents with    6 Month Follow-Up    Palpitations     Referred for syncope from neurology at [facility]    Hx of Two episodes syncope  2019-syncope  Happened at work  Does not remember pre and after and vene does not remember being and going to work that day    2nd one few days later at work  Work in [employer]- as help  And   NO memory to [address] tired and drained  Does not remember most    No Injury externally      6 month follow up. EKG done today.     Denied chest pain, sob,  or edema    Occasional palpitations    Rare episodes of dizziness    Dizziness - room spinning- worse with change of head position , sitting supine or standing  Noted 2 days back  And none after and none for a while before  Previous hx of vertigo    Hx of chronic atypical cp  And noncardiac  Getting better and less frequent,       Hx of Pyrosis and help with tums    Hx of non-psotural dizziness and resolved  Hx of Dizziness-in supine , sitting or standing  Hx of vertigo- resolved    No syncope since 2.5 years    nevere smoked    FHX  Parent and sisters okay  None in grand parents      Patient Active Problem List   Diagnosis    Dizziness/ Vertigo    Intermittent palpitations    History of syncope    Chest pain, atypical    Chest wall pain    BPPV (benign paroxysmal positional vertigo)       Past Surgical History:   Procedure Laterality Date     SECTION      times 4    KNEE SURGERY      times 2       Allergies   Allergen Reactions    Meloxicam     Methylprednisolone         Family History   Problem Relation Age of Onset    No Known Problems Mother     No Known Problems Father         Social History     Socioeconomic History    Marital status: Legally      Spouse name: Not on file    Number of children: Not on file    Years of education: Not on file    Highest education level: Not on file   Occupational History    Not on file   Tobacco Use    Smoking status: Never Smoker    Smokeless tobacco: Never Used   Vaping Use    Vaping Use: Never used   Substance and Sexual Activity    Alcohol use: Never    Drug use: Never    Sexual activity: Not on file   Other Topics Concern    Not on file   Social History Narrative    Not on file     Social Determinants of Health     Financial Resource Strain:     Difficulty of Paying Living Expenses: Not on file   Food Insecurity:     Worried About Running Out of Food in the Last Year: Not on file    Vivi of Food in the Last Year: Not on file   Transportation Needs:     Lack of Transportation (Medical): Not on file    Lack of Transportation (Non-Medical):  Not on file   Physical Activity:     Days of Exercise per Week: Not on file    Minutes of Exercise per Session: Not on file   Stress:     Feeling of Stress : Not on file   Social Connections:     Frequency of Communication with Friends and Family: Not on file    Frequency of Social Gatherings with Friends and Family: Not on file    Attends Jain Services: Not on file    Active Member of [address] [place] [address] or Organizations: Not on file    Attends Club or Organization Meetings: Not on file    Marital Status: Not on file   Intimate Partner Violence:     Fear of Current or Ex-Partner: Not on file    Emotionally Abused: Not on file    Physically Abused: Not on file    Sexually Abused: Not on file   Housing Stability:     Unable to Pay for Housing in the Last Year: Not on file    Number of [place] in the Last Year: Not on file    Unstable Housing in the Last Year: Not on file       Current Outpatient Medications   Medication Sig Dispense Refill    metoprolol succinate (TOPROL XL) 25 MG extended release tablet Take 0.5 tablets by mouth daily 45 tablet 1    albuterol (PROVENTIL) (2.5 MG/3ML) 0.083% nebulizer solution Take 2.5 mg by nebulization every 6 hours as needed for Wheezing      aspirin 81 MG EC tablet Take 1 tablet by mouth every 6 hours as needed for Pain 30 tablet     SUMAtriptan (IMITREX) 50 MG tablet Take 100 mg by mouth once      acetaminophen (TYLENOL) 500 MG tablet Take 500 mg by mouth every 6 hours as needed for Pain      albuterol sulfate  (90 Base) MCG/ACT inhaler Inhale 2 puffs into the lungs every 6 hours as needed for Wheezing      ibuprofen (ADVIL;MOTRIN) 800 MG tablet Take 800 mg by mouth every 6 hours as needed for Pain      metoprolol succinate (TOPROL XL) 25 MG extended release tablet Take 0.5 tablets by mouth daily 45 tablet 0     No current facility-administered medications for this visit. Review of Systems -     General ROS: negative  Psychological ROS: negative  Hematological and Lymphatic ROS: No history of blood clots or bleeding disorder. Respiratory ROS: no cough,  or wheezing, the rest see HPI  Cardiovascular ROS: See HPI  Gastrointestinal ROS: negative  Genito-Urinary ROS: no dysuria, trouble voiding, or hematuria  Musculoskeletal ROS: negative  Neurological ROS: no TIA or stroke symptoms  Dermatological ROS: negative      Blood pressure 136/78, pulse 68, height 5' 2\" (1.575 m), weight 179 lb 6.4 oz (81.4 kg).         Physical Examination:    General appearance - alert, well appearing, and in no distress  HEENT- Pink conjunctiva  , Non-icteri sclera,PERRLA  Mental status - alert, oriented to person, place, and time  Neck - supple, no significant adenopathy, no JVD, or carotid bruits  Chest - clear to auscultation, no wheezes, rales or rhonchi, symmetric air entry  Heart - normal rate, regular rhythm, normal S1, S2, no murmurs, rubs, clicks or gallops  Abdomen - soft, nontender, nondistended, no masses or organomegaly  JOSE- no CVA or flank tenderness, no suprapubic tenderness  Neurological - alert, oriented, normal speech, no focal findings or movement disorder noted  Musculoskeletal/limbs - no joint tenderness, deformity or swelling   - peripheral pulses normal, no pedal edema, no clubbing or cyanosis  Skin - normal coloration and turgor, no rashes, no suspicious skin lesions noted  Psych- appropriate mood and affect    Lab  No results for input(s): CKTOTAL, CKMB, CKMBINDEX, TROPONINI in the last 72 hours. CBC: No results found for: WBC, RBC, HGB, HCT, MCV, MCH, MCHC, RDW, PLT, MPV  BMP:  No results found for: NA, K, CL, CO2, BUN, LABALBU, CREATININE, CALCIUM, GFRAA, LABGLOM, GLUCOSE, GLU  Hepatic Function Panel:  No results found for: ALKPHOS, ALT, AST, PROT, BILITOT, BILIDIR, IBILI, LABALBU  Magnesium:  No results found for: MG  Warfarin PT/INR:  No components found for: PTPATWAR, PTINRWAR  HgBA1c:  No results found for: LABA1C  FLP:  No results found for: TRIG, HDL, LDLCALC, LDLDIRECT, LABVLDL  TSH:  No results found for: TSH      Stress echo  Conclusions    Summary   No chest pain   No stress Induced Arrhythmia   Good exercise capacity with METS of 10   Exercise time was 8 :30 minutes   Exercise EKG is negative for ischemia   no stress-induced segmental wall motion abnormality. Proper augmentation of the left ventricular with exercise   no stress induced cavity dilation. Exercise stress echo is not suggestive for cardiac ischemia   Appropriate hemodynamic response to exercise. Signature    ----------------------------------------------------------------   Electronically signed by Jg Shah MD (Interpreting   physician) on 06/19/2019 at 08:42 PM   ----------------------------------------------------------------      Conclusions    Summary   Normal left ventricle size and Low Normal systolic function. Ejection   fraction was estimated at 50 %. There were no regional left ventricular   wall motion abnormalities and wall thickness was within normal limits. Signature    ----------------------------------------------------------------   Electronically signed by Jg Shah MD (Interpreting   physician) on 06/19/2019 at 08:39 PM      EKG 5/7/19  NSR, no acute abn    CONCLUSION:  1.   Sinus rhythm. 2.  Episodes of palpitation and racing sensation, which correlated with  sinus tachycardia and sometimes with occasional ectopy. Otherwise, no  sustained rhythm disturbance.  Rosalinda Goltz, M.D.  Kenzie Founds: 06/07/2019 14:42:17     ekg  10/31/19     Sinus  Rhythm   WITHIN NORMAL LIMITS    ekg 11/4/2020     Sinus  Rhythm   WITHIN NORMAL LIMITS    ekg 3/17/2021  Sinus  Rhythm   WITHIN NORMAL LIMITS        Conclusions    Summary   Exercise capacity:Poor for her age   Patient was having limiting SOB. Patient did have chest pain at the peak the exercise at the 6 th min of   exercise aching 8/10 and resolved 4 min in to recovery. Pat had chest pain   for total of 5 min since chest pain started. Exercise EKG stress test is not suggestive for ischemia. Recommendation   Clinical correlation is recommended. Signatures    ----------------------------------------------------------------   Electronically signed by Ignacio Davis MD (Performing   Physician) on 04/14/2021 at 18:47    Assessment     Diagnosis Orders   1. Intermittent palpitations  EKG 12 Lead   2. History of syncope     3. Dizziness/ Vertigo           Plan   The  current meds and labs reviewed    Home  to [address]  Her usual BP low in office than at homw    Hx of Syncope 2019  Dizziness supine and standing- non-postural  BPP Vertigo- room spinning  No Hx of siezure  Hydration      Echo and stress echo WNL-2019  TTT- negative  Event Monitor 3 weeks- episodes sinus tachy    Bedside orthostatic check- No orthostatic drop  Orient salt intake  Cont  Getorade and hydration    Palpitation-intermittent- better on troprol xl 12.5  May increase toprol xl 25 po qhs from 12.5    Overall Stable and doing better    D/w the pat the plan of care     Discussed use, benefit, and side effects of prescribed medications. All patient questions answered. Pt voiced understanding. Instructed to continue current medications, diet and exercise.  Continue risk factor modification and medical management. Patient agreed with treatment plan. Follow up as directed.       RTC In  6 months    Rosario Stern [facility]

## 2022-03-10 RX ORDER — METOPROLOL SUCCINATE 25 MG/1
25 TABLET, EXTENDED RELEASE ORAL DAILY
Qty: 90 TABLET | Refills: 3 | Status: SHIPPED | OUTPATIENT
Start: 2022-03-10

## 2022-04-25 NOTE — TELEPHONE ENCOUNTER
Pt sent my chart message. Pt took BP last night 128/81   Brenna Leyden M  to Devon Stallings MD          8:27 AM  Last night my left arm was hurting a little but felt fine. This morning I feel somewhat ok but still having the discomfort in the chest. I'm also at work. Im also sweaty hot and fatigue   Last time lol. I took my blood pressure and my heart rate was 88 this morning       I did reply to pt that she should go to ER if still feeling this way, but would wait on Dr. Romero Sick reply. 1 or 2

## 2022-05-25 NOTE — PROGRESS NOTES
(IMITREX) 50 MG tablet Take 100 mg by mouth once      metoprolol succinate (TOPROL XL) 25 MG extended release tablet Take 0.5 tablets by mouth daily 45 tablet 3    gabapentin (NEURONTIN) 100 MG capsule Take 100 mg by mouth 2 times daily.  Galcanezumab-gnlm 120 MG/ML SOAJ Inject 120 mg into the skin every 30 days      acetaminophen (TYLENOL) 500 MG tablet Take 500 mg by mouth every 6 hours as needed for Pain      albuterol sulfate  (90 Base) MCG/ACT inhaler Inhale 2 puffs into the lungs every 6 hours as needed for Wheezing      fluticasone (FLOVENT HFA) 220 MCG/ACT inhaler Inhale 1 puff into the lungs 2 times daily      ibuprofen (ADVIL;MOTRIN) 800 MG tablet Take 800 mg by mouth every 6 hours as needed for Pain       No current facility-administered medications for this visit.         Social History     Socioeconomic History    Marital status: Legally      Spouse name: None    Number of children: None    Years of education: None    Highest education level: None   Occupational History    None   Social Needs    Financial resource strain: None    Food insecurity     Worry: None     Inability: None    Transportation needs     Medical: None     Non-medical: None   Tobacco Use    Smoking status: Never Smoker    Smokeless tobacco: Never Used   Substance and Sexual Activity    Alcohol use: Never     Frequency: Never    Drug use: Never    Sexual activity: None   Lifestyle    Physical activity     Days per week: None     Minutes per session: None    Stress: None   Relationships    Social connections     Talks on phone: None     Gets together: None     Attends [employer] service: None     Active member of club or organization: None     Attends meetings of clubs or organizations: None     Relationship status: None    Intimate partner violence     Fear of current or ex partner: None     Emotionally abused: None     Physically abused: None     Forced sexual activity: None   Other Topics Concern    None   Social History Narrative    None       Family History   Problem Relation Age of Onset    No Known Problems Mother     No Known Problems Father        Blood pressure 124/62, pulse 84, height 5' 2.5\" (1.588 m), weight 184 lb (83.5 kg). General:   Well developed, well nourished  Lungs:   Clear to auscultation  Heart:    Normal S1 S2, No murmur, rubs, or gallops  Abdomen:   Soft, non tender, no organomegalies, positive bowel sounds  Extremities:   No edema, no cyanosis, good peripheral pulses  Neurological:   Awake, alert, oriented. No obvious focal deficits  Musculoskeletal:  No obvious deformities    EKG:     Stress Test: 4/14/21   Summary   Exercise capacity:Poor for her age   Patient was having limiting SOB. Patient did have chest pain at the peak the exercise at the 6 th min of   exercise aching 8/10 and resolved 4 min in to recovery. Pat had chest pain   for total of 5 min since chest pain started. Exercise EKG stress test is not suggestive for ischemia. Recommendation   Clinical correlation is recommended. Signatures      ----------------------------------------------------------------   Electronically signed by Georgina Marcum MD     Echo: 6/19/19  Summary   Normal left ventricle size and Low Normal systolic function. Ejection   fraction was estimated at 50 %. There were no regional left ventricular   wall motion abnormalities and wall thickness was within normal limits. Signature      ----------------------------------------------------------------   Electronically signed by Georgina Marcum MD     Tilt table  CONCLUSION:  1. This is a benign tilt table study. 2.  Tilt table test is negative for vasovagal response. 3.  Tilt table test is negative for orthostatic hypotension. 4.  Tilt table test is negative for POT syndrome (postural orthostatic  tachycardia syndrome).     Overall, this is a benign tilt table study.           LUCERO Mo M.D.      Diagnosis Orders 1. Hospital discharge follow-up     2. Intermittent palpitations     3. Dizziness     4. History of syncope         No orders of the defined types were placed in this encounter. Follow up hospitalization for chest pain and sob  History of syncope, vertigo, asthma, anxiety and had COVID 19 infection October 2020  HR, B/P stable  Denies chest pain and sob. Stress test negative for ischemia  Benign tilt table  EF 50%  Staying hydrated, less dizziness, lightheadedness, has intermittent palpitations  Continue asa, toprol xl     Discussed use, benefit, and side effects of prescribed medications asa, toprol xl. Reoprst compliance, denies adverse effects. All patient questions answered. Pt voiced understanding. Instructed to continue current medications, diet and exercise. Continue risk factor modification and medical management. Patient agreed with treatment plan. Follow up as directed.     Continue Dr Christa Mccoy current treatment plan  Follow up with Dr Orlando Alfredo as scheduled or sooner if needed English

## 2022-09-14 ENCOUNTER — OFFICE VISIT (OUTPATIENT)
Dept: CARDIOLOGY CLINIC | Age: 42
End: 2022-09-14
Payer: COMMERCIAL

## 2022-09-14 VITALS
HEART RATE: 59 BPM | SYSTOLIC BLOOD PRESSURE: 115 MMHG | BODY MASS INDEX: 34.19 KG/M2 | WEIGHT: 185.8 LBS | DIASTOLIC BLOOD PRESSURE: 76 MMHG | HEIGHT: 62 IN

## 2022-09-14 DIAGNOSIS — R42 DIZZINESS: ICD-10-CM

## 2022-09-14 DIAGNOSIS — R00.2 INTERMITTENT PALPITATIONS: Primary | ICD-10-CM

## 2022-09-14 DIAGNOSIS — Z87.898 HISTORY OF SYNCOPE: ICD-10-CM

## 2022-09-14 PROBLEM — R07.89 CHEST PAIN, ATYPICAL: Status: RESOLVED | Noted: 2019-07-25 | Resolved: 2022-09-14

## 2022-09-14 PROCEDURE — 1036F TOBACCO NON-USER: CPT | Performed by: INTERNAL MEDICINE

## 2022-09-14 PROCEDURE — 99213 OFFICE O/P EST LOW 20 MIN: CPT | Performed by: INTERNAL MEDICINE

## 2022-09-14 PROCEDURE — G8427 DOCREV CUR MEDS BY ELIG CLIN: HCPCS | Performed by: INTERNAL MEDICINE

## 2022-09-14 PROCEDURE — G8417 CALC BMI ABV UP PARAM F/U: HCPCS | Performed by: INTERNAL MEDICINE

## 2022-09-14 NOTE — PROGRESS NOTES
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 Pt here for a 6 month f/u    EKG done 3-9-22    Denied chest pain, sob,  dizziness or edema    Occasional palpitations    Better fater better hydration    No more dizziness  No more vertigo    Hx of Dizziness - room spinning- worse with change of head position , sitting supine or standing  Noted 2 days back  And none after and none for a while before  Previous hx of vertigo    Hx of chronic atypical cp  And noncardiac  Getting better and less frequent,       Hx of Pyrosis and help with tums    Hx of non-psotural dizziness and resolved  Hx of Dizziness-in supine , sitting or standing  Hx of vertigo- resolved    No syncope since 2.5 years    nevere smoked    FHX  Parent and sisters okay  None in grand parents      Patient Active Problem List   Diagnosis    Dizziness/ Vertigo    Intermittent palpitations    History of syncope    Chest wall pain    BPPV (benign paroxysmal positional vertigo)       Past Surgical History:   Procedure Laterality Date     SECTION      times 4    KNEE SURGERY      times 2       Allergies   Allergen Reactions    Methylprednisolone         Family History   Problem Relation Age of Onset    No Known Problems Mother     No Known Problems Father         Social History     Socioeconomic History    Marital status:      Spouse name: Not on file    Number of children: Not on file    Years of education: Not on file    Highest education level: Not on file   Occupational History    Not on file   Tobacco Use Smoking status: Never    Smokeless tobacco: Never   Vaping Use    Vaping Use: Never used   Substance and Sexual Activity    Alcohol use: Never    Drug use: Never    Sexual activity: Not on file   Other Topics Concern    Not on file   Social History Narrative    Not on file     Social Determinants of Health     Financial Resource Strain: Not on file   Food Insecurity: Not on file   Transportation Needs: Not on file   Physical Activity: Not on file   Stress: Not on file   Social Connections: Not on file   Intimate Partner Violence: Not on file   Housing Stability: Not on file       Current Outpatient Medications   Medication Sig Dispense Refill    metoprolol succinate (TOPROL XL) 25 MG extended release tablet Take 1 tablet by mouth daily 90 tablet 3    albuterol (PROVENTIL) (2.5 MG/3ML) 0.083% nebulizer solution Take 2.5 mg by nebulization every 6 hours as needed for Wheezing      aspirin 81 MG EC tablet Take 1 tablet by mouth every 6 hours as needed for Pain 30 tablet     SUMAtriptan (IMITREX) 50 MG tablet Take 100 mg by mouth once      acetaminophen (TYLENOL) 500 MG tablet Take 500 mg by mouth every 6 hours as needed for Pain      albuterol sulfate  (90 Base) MCG/ACT inhaler Inhale 2 puffs into the lungs every 6 hours as needed for Wheezing      ibuprofen (ADVIL;MOTRIN) 800 MG tablet Take 800 mg by mouth every 6 hours as needed for Pain       No current facility-administered medications for this visit. Review of Systems -     General ROS: negative  Psychological ROS: negative  Hematological and Lymphatic ROS: No history of blood clots or bleeding disorder.    Respiratory ROS: no cough,  or wheezing, the rest see HPI  Cardiovascular ROS: See HPI  Gastrointestinal ROS: negative  Genito-Urinary ROS: no dysuria, trouble voiding, or hematuria  Musculoskeletal ROS: negative  Neurological ROS: no TIA or stroke symptoms  Dermatological ROS: negative      Blood pressure 115/76, pulse 59, height 5' 2\" (1.575 m), weight 185 lb 12.8 oz (84.3 kg). Physical Examination:    General appearance - alert, well appearing, and in no distress  HEENT- Pink conjunctiva  , Non-icteri sclera,PERRLA  Mental status - alert, oriented to person, place, and time  Neck - supple, no significant adenopathy, no JVD, or carotid bruits  Chest - clear to auscultation, no wheezes, rales or rhonchi, symmetric air entry  Heart - normal rate, regular rhythm, normal S1, S2, no murmurs, rubs, clicks or gallops  Abdomen - soft, nontender, nondistended, no masses or organomegaly  JOSE- no CVA or flank tenderness, no suprapubic tenderness  Neurological - alert, oriented, normal speech, no focal findings or movement disorder noted  Musculoskeletal/limbs - no joint tenderness, deformity or swelling   - peripheral pulses normal, no pedal edema, no clubbing or cyanosis  Skin - normal coloration and turgor, no rashes, no suspicious skin lesions noted  Psych- appropriate mood and affect    Lab  No results for input(s): CKTOTAL, CKMB, CKMBINDEX, TROPONINI in the last 72 hours. CBC: No results found for: WBC, RBC, HGB, HCT, MCV, MCH, MCHC, RDW, PLT, MPV  BMP:  No results found for: NA, K, CL, CO2, BUN, LABALBU, CREATININE, CALCIUM, GFRAA, LABGLOM, GLUCOSE, GLU  Hepatic Function Panel:  No results found for: ALKPHOS, ALT, AST, PROT, BILITOT, BILIDIR, IBILI, LABALBU  Magnesium:  No results found for: MG  Warfarin PT/INR:  No components found for: PTPATWAR, PTINRWAR  HgBA1c:  No results found for: LABA1C  FLP:  No results found for: TRIG, HDL, LDLCALC, LDLDIRECT, LABVLDL  TSH:  No results found for: TSH      Stress echo  Conclusions    Summary   No chest pain   No stress Induced Arrhythmia   Good exercise capacity with METS of 10   Exercise time was 8 :30 minutes   Exercise EKG is negative for ischemia   no stress-induced segmental wall motion abnormality. Proper augmentation of the left ventricular with exercise   no stress induced cavity dilation.    Exercise stress echo is not suggestive for cardiac ischemia   Appropriate hemodynamic response to exercise. Signature    ----------------------------------------------------------------   Electronically signed by Ivan Flores MD (Interpreting   physician) on 06/19/2019 at 08:42 PM   ----------------------------------------------------------------      Conclusions    Summary   Normal left ventricle size and Low Normal systolic function. Ejection   fraction was estimated at 50 %. There were no regional left ventricular   wall motion abnormalities and wall thickness was within normal limits. Signature    ----------------------------------------------------------------   Electronically signed by Ivan Flores MD (Interpreting   physician) on 06/19/2019 at 08:39 PM      EKG 5/7/19  NSR, no acute abn    CONCLUSION:  1. Sinus rhythm. 2.  Episodes of palpitation and racing sensation, which correlated with  sinus tachycardia and sometimes with occasional ectopy. Otherwise, no  sustained rhythm disturbance. Bijan Corbett M.D.   D: 06/07/2019 14:42:17     ekg  10/31/19     Sinus  Rhythm   WITHIN NORMAL LIMITS    ekg 11/4/2020     Sinus  Rhythm   WITHIN NORMAL LIMITS    ekg 3/17/2021  Sinus  Rhythm   WITHIN NORMAL LIMITS        Conclusions    Summary   Exercise capacity:Poor for her age   Patient was having limiting SOB. Patient did have chest pain at the peak the exercise at the 6 th min of   exercise aching 8/10 and resolved 4 min in to recovery. Pat had chest pain   for total of 5 min since chest pain started. Exercise EKG stress test is not suggestive for ischemia. Recommendation   Clinical correlation is recommended. Signatures    ----------------------------------------------------------------   Electronically signed by Ivan Flores MD (Performing   Physician) on 04/14/2021 at 18:47    Ekg 3/9/22  Sinus  Rhythm   WITHIN NORMAL LIMITS    Assessment     Diagnosis Orders   1.  Intermittent palpitations 2. History of syncope        3. Dizziness/ Vertigo                Plan   The  most current meds and labs reviewed    Home  to [address]  Her usual BP low in office than at homw    Hx of Syncope 2019  Dizziness supine and standing- non-postural  BPP Vertigo- room spinning  No Hx of siezure  Hydration    Echo and stress echo WNL-2019  TTT- negative  Event Monitor 3 weeks- episodes sinus tachy    Bedside orthostatic check- No orthostatic drop  [place] salt intake  Cont  Getorade and hydration    Palpitation-intermittent- better on troprol xl 12.5  Cont  toprol xl 25 po qhs     Pat is Overall Stable and doing better    D/w the pat the plan of care     Discussed use, benefit, and side effects of prescribed medications. All patient questions answered. Pt voiced understanding. Instructed to continue current medications, diet and exercise. Continue risk factor modification and medical management. Patient agreed with treatment plan. Follow up as directed.       RTC In  9 months    Dayanna Kowalski [facility]

## 2022-10-13 ENCOUNTER — HOSPITAL ENCOUNTER (OUTPATIENT)
Dept: GENERAL RADIOLOGY | Age: 42
Discharge: HOME | End: 2022-10-13

## 2022-10-13 ENCOUNTER — OFFICE VISIT (OUTPATIENT)
Dept: NEUROSURGERY | Age: 42
End: 2022-10-13
Payer: COMMERCIAL

## 2022-10-13 ENCOUNTER — HOSPITAL ENCOUNTER (OUTPATIENT)
Dept: MRI IMAGING | Age: 42
Discharge: HOME | End: 2022-10-13

## 2022-10-13 VITALS
WEIGHT: 185.85 LBS | SYSTOLIC BLOOD PRESSURE: 130 MMHG | BODY MASS INDEX: 34.2 KG/M2 | HEIGHT: 62 IN | DIASTOLIC BLOOD PRESSURE: 84 MMHG | HEART RATE: 62 BPM

## 2022-10-13 DIAGNOSIS — G95.0 SYRINX (HCC): ICD-10-CM

## 2022-10-13 DIAGNOSIS — Z00.6 ENCOUNTER FOR EXAMINATION FOR NORMAL COMPARISON AND CONTROL IN CLINICAL RESEARCH PROGRAM: ICD-10-CM

## 2022-10-13 DIAGNOSIS — M54.16 LUMBAR RADICULOPATHY: Primary | ICD-10-CM

## 2022-10-13 PROCEDURE — G8484 FLU IMMUNIZE NO ADMIN: HCPCS | Performed by: PHYSICIAN ASSISTANT

## 2022-10-13 PROCEDURE — G8427 DOCREV CUR MEDS BY ELIG CLIN: HCPCS | Performed by: PHYSICIAN ASSISTANT

## 2022-10-13 PROCEDURE — 1036F TOBACCO NON-USER: CPT | Performed by: PHYSICIAN ASSISTANT

## 2022-10-13 PROCEDURE — G8417 CALC BMI ABV UP PARAM F/U: HCPCS | Performed by: PHYSICIAN ASSISTANT

## 2022-10-13 PROCEDURE — 99203 OFFICE O/P NEW LOW 30 MIN: CPT | Performed by: PHYSICIAN ASSISTANT

## 2022-10-13 ASSESSMENT — ENCOUNTER SYMPTOMS
BACK PAIN: 1
APNEA: 0
CHEST TIGHTNESS: 0
ABDOMINAL DISTENTION: 0
SHORTNESS OF BREATH: 0
ABDOMINAL PAIN: 1

## 2022-10-13 NOTE — PROGRESS NOTES
[facility]ESSION[facility]  [address] [place] [address] [address] [address]  Dept: [phone]  Dept Fax: [phone]      Patient Name:  Ian Edouard  Visit Date:  10/13/2022    HPI:     Ms. Shaji Sánchez is a 39 y.o. female that presents today at [facility] for evaluation of the following: Referred to our service for evaluation of radiating low back pain secondary to findings significant for lumbar disc herniation    Chief Complaint   Patient presents with    Consultation     Small central disc protrusion         HPI   Mrs. Shaji Sánchez is a pleasant, active 70-year-old female, a never smoker of tobacco and nonuser smokeless tobacco or vaping who denies alcohol use and has a medical history significant for asthma  , Located surgical history significant only for  section and prior knee surgery. She presents to our service as a referral from pain management for evaluation of radiating low back pain bilaterally to the lower extremities secondary to disc herniation at L5-S1 evidenced on recent MRI of the lumbar spine. Reports immediately available for lumbar spine x-ray, imaged on 2022, is considered unremarkable with the vertebral heights and intervertebral spaces well-maintained is absent fracture or acute findings. 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 A small signal abnormality in the central conus suggestive of possible syrinx is noted with the recommendation for MRI thoracic indicated. Actual films were not available at the time of exam and have been forwarded for review. She arrives today unaccompanied and walking without assistance. She did remain in a chair throughout the exam process stating that standing and walking does exacerbate her pain.   Relates that the pain is been ongoing for the better part of 2 years, worsening over the last 6 months to include radiating low back pain to the extremities bilaterally. Typical distribution is bilateral low back pain to the level of the knees with occasional radiation to the feet. She has been treating her symptoms with over-the-counter relief and conservatively including physical therapy which she states may have worsened her condition and with pain management who was provided medical treatment in the form of tramadol. She has not had any injection therapies and has not received chiropractic care. She has added, that her work environment often includes heavy lifting which also exacerbates her pain. Reviewed MRI findings along with x-ray findings on reports, including the presence of a syrinx. Medications:    Current Outpatient Medications:     metoprolol succinate (TOPROL XL) 25 MG extended release tablet, Take 1 tablet by mouth daily, Disp: 90 tablet, Rfl: 3    albuterol (PROVENTIL) (2.5 MG/3ML) 0.083% nebulizer solution, Take 2.5 mg by nebulization every 6 hours as needed for Wheezing, Disp: , Rfl:     aspirin 81 MG EC tablet, Take 1 tablet by mouth every 6 hours as needed for Pain, Disp: 30 tablet, Rfl:     SUMAtriptan (IMITREX) 50 MG tablet, Take 100 mg by mouth once, Disp: , Rfl:     acetaminophen (TYLENOL) 500 MG tablet, Take 500 mg by mouth every 6 hours as needed for Pain, Disp: , Rfl:     albuterol sulfate  (90 Base) MCG/ACT inhaler, Inhale 2 puffs into the lungs every 6 hours as needed for Wheezing, Disp: , Rfl:     ibuprofen (ADVIL;MOTRIN) 800 MG tablet, Take 800 mg by mouth every 6 hours as needed for Pain, Disp: , Rfl:     The patient is allergic to methylprednisolone. Past Medical History  Temitope Mata  has a past medical history of Asthma. Past Surgical History  The patient  has a past surgical history that includes  section and knee surgery.     Family History  This patient's family history includes No Known Problems in her father and mother. Social History  Bridget Womack  reports that she has never smoked. She has never used smokeless tobacco. She reports that she does not drink alcohol and does not use drugs. Subjective:      Review of Systems   Constitutional:  Positive for activity change. Respiratory:  Negative for apnea, chest tightness and shortness of breath. Cardiovascular:  Negative for chest pain and leg swelling. Gastrointestinal:  Positive for abdominal pain. Negative for abdominal distention. Musculoskeletal:  Positive for back pain. Neurological:  Positive for weakness. Negative for dizziness, numbness and headaches. Psychiatric/Behavioral:  Negative for agitation, behavioral problems, confusion and decreased concentration. Objective:     /84 (Site: Left Upper Arm, Position: Sitting, Cuff Size: Large Adult)   Pulse 62   Ht 5' 2.01\" (1.575 m)   Wt 185 lb 13.6 oz (84.3 kg)   BMI 33.98 kg/m²      Examination of carotid arteries (puls, amplitude, bruits) or Examination of peripheral vascular system  (swelling, varicosities and pulses, temperature, edema,tenderness : Good distal pulses  Patient is alerted w/0 x3: Intact and appropriate    Muscle strength, tone in all 4 extremities: Trace weakness left greater than right with breakaway weakness noted. DTR in all 4 extremities: 2/4 throughout  Babinski: Negative  Gait: Antalgic and measured  Cerebellar function: Smooth  Sensation: Intact sensation bilaterally and symmetrical.  Straight leg raising test: Positive bilaterally    Physical Exam  Constitutional:       Appearance: Normal appearance. HENT:      Head: Normocephalic and atraumatic. Eyes:      Extraocular Movements: Extraocular movements intact. Pupils: Pupils are equal, round, and reactive to light. Cardiovascular:      Rate and Rhythm: Normal rate. Pulmonary:      Effort: Pulmonary effort is normal. No respiratory distress.    Abdominal: General: Abdomen is flat. Bowel sounds are normal. There is no distension. Tenderness: There is no abdominal tenderness. Musculoskeletal:      Comments: Limited range of motion secondary to pain. Skin:     General: Skin is warm and dry. Neurological:      General: No focal deficit present. Mental Status: She is alert and oriented to person, place, and time. Sensory: No sensory deficit. Motor: Weakness present. Comments: 5/5 strength for lower extremity groups bilaterally and symmetrically with some trace weakness noted in left greater than right distribution diffusely. Concern dorsiflexion along with extensor hallux longus are at 5/5. Is intact for pin prick sensation bilaterally and symmetrically. Positive straight leg raise elicited bilateral.   Psychiatric:         Mood and Affect: Mood normal.         Behavior: Behavior normal.         Thought Content: Thought content normal.         Judgment: Judgment normal.       Reviewed MRI Type:  Film and Report    No results found for: WBC, HGB, HCT, PLT, CHOL, TRIG, HDL, LDLDIRECT, ALT, AST, NA, K, CL, CREATININE, BUN, CO2, TSH, PSA, INR, GLUF, LABA1C, LABMICR    Assessment and Plan      Diagnosis Orders   1. Lumbar radiculopathy            Recent MRI and x-rays of the lumbar spine have been ordered for direct review. Based on abnormal finding on MRI significant for possible syrinx, a MRI of the thoracic spine is ordered to rule out additional abnormalities. A referral to pain management for evaluation for injection therapy is processed at this appointment with a follow-up evaluation in approximately 4 weeks to ascertain improvements from injection therapies and review new imaging. She remains happy with today's appointment having questions concerns addressed and answered and looks forward to her follow-up appointments with our service with others.   Encouraged to reach out to our service with any additional questions or concerns or should she experience any significant change in her general condition. She is further encouraged to seek acute care in the emergency department setting should pain become intractable.        Electronically signed by Nila Barthel, PA-C on 10/13/2022 at 10:15 AM

## 2022-11-09 ENCOUNTER — HOSPITAL ENCOUNTER (OUTPATIENT)
Dept: MRI IMAGING | Age: 42
Discharge: HOME | End: 2022-11-09
Payer: COMMERCIAL

## 2022-11-09 DIAGNOSIS — G95.0 SYRINX (HCC): ICD-10-CM

## 2022-11-09 PROCEDURE — 72157 MRI CHEST SPINE W/O & W/DYE: CPT

## 2022-11-09 PROCEDURE — A9579 GAD-BASE MR CONTRAST NOS,1ML: HCPCS | Performed by: PHYSICIAN ASSISTANT

## 2022-11-09 PROCEDURE — 6360000004 HC RX CONTRAST MEDICATION: Performed by: PHYSICIAN ASSISTANT

## 2022-11-09 RX ADMIN — GADOTERIDOL 15 ML: 279.3 INJECTION, SOLUTION INTRAVENOUS at 12:16

## 2022-11-09 NOTE — PROGRESS NOTES
Chronic Pain/PM&R Clinic Note     Encounter Date: 11/10/22    Subjective:   Chief Complaint:   Chief Complaint   Patient presents with    New Patient     Lumbar Radiculopathy        History of Present Illness:   Romina Garcia is a 43 y.o. female seen in the clinic initially on 11/10/2022 upon request from [place], [place]  for her history of low back pain. Patient states her pain started about one year ago without any inciting event. She states she does work in [employer] which has required her to lift 40-60 pounds at one time. She wonders if this repetitive work has lead to her current issues. She states her pain has been getting gradually worse. Pain is aggravated by sitting, walking or standing for any amount of time. She states she has not found anything to help with her pain. She does get some pain radiating into the posterior aspect of bilateral thighs. She denies history of falls. She does not use an assistive device for ambulation. She does not notice trouble with balance but does feels like she has tripped over her own feet more recently. She states she has not been sleeping well due to not being able to gt comfortable. She pursued physical therapy at [place] a couple months ago. Physical therapy aggravated her pain significantly. She does feel like she has weakness in her legs, the left greater than her right. She states she cannot lift her legs due to pain in her back. She denies any saddle anesthesia or bowel/bladder incontinence. Of note, she had a thoracic MRI yesterday and is slated to see Kobe Carbone with neurosurgery on 12/01/2022 to review and discuss potential surgical intervention. History of Interventions:   Surgery: No previous lumbar surgeries  Injections: None    Current Treatment Medications:   Tramadol 50 mg TID PRN - ineffective  Ibuprofen 800 mg - ineffective  Mobic - ineffective    Historical Treatment Medications:   Tylenol - ineffective    Imaging:   Will request Lumbar xray and MRI report. Past Medical History:   Diagnosis Date    Asthma        Past Surgical History:   Procedure Laterality Date     SECTION      times 4    KNEE SURGERY      times 2       Family History   Problem Relation Age of Onset    No Known Problems Mother     No Known Problems Father        Medications & Allergies:   Current Outpatient Medications   Medication Instructions    acetaminophen (TYLENOL) 500 mg, Oral, EVERY 6 HOURS PRN    AJOVY 225 MG/1.5ML SOSY No dose, route, or frequency recorded. albuterol (PROVENTIL) 2.5 mg, Nebulization, EVERY 6 HOURS PRN    albuterol sulfate  (90 Base) MCG/ACT inhaler 2 puffs, Inhalation, EVERY 6 HOURS PRN    aspirin 81 mg, Oral, EVERY 6 HOURS PRN    HYDROcodone-acetaminophen (NORCO) 5-325 MG per tablet 1 tablet, Oral, 2 TIMES DAILY PRN    ibuprofen (ADVIL;MOTRIN) 800 mg, Oral, EVERY 6 HOURS PRN    metoprolol succinate (TOPROL XL) 25 mg, Oral, DAILY    SUMAtriptan (IMITREX) 100 mg, Oral, ONCE       Allergies   Allergen Reactions    Methylprednisolone        Review of Systems:   Constitutional: negative for weight changes or fevers  Genitourinary: negative for bowel/bladder incontinence   Musculoskeletal: positive for low back pain, bilateral leg pain  Neurological: positive for bilateral leg weakness. Negative for leg numbness/tingling  Behavioral/Psych: negative for anxiety/depression   All other systems reviewed and are negative    Objective:     Vitals:    11/10/22 0805   BP: 118/78     Constitutional: Pleasant, no acute distress   Head: Normocephalic, atraumatic   Eyes: Conjunctivae normal   Neck: Supple, symmetrical   Lungs: Normal respiratory effort, non-labored breathing   Cardiovascular: Limbs warm and well perfused   Abdomen: Non-protruded   Musculoskeletal: Muscle bulk symmetric, no atrophy, no gross deformities   · Lower Extremities: ROM WNL. · Thorax: No paraspinal tenderness bilaterally. No scoliosis or kyphosis.    · Lumbar Spine: ROM limited by pain. Lumbar paraspinals tender to palpation bilaterally. SLR neg bilaterally. DANIELLE positive bilaterally. GAENSLEN positive bilaterally. Positive SI distraction bilaterally. Positive facet loading bilaterally. Bilateral SI joints tender to palpation. Bilateral greater trochanters non-tender to palpation. Neurological: Cranial nerves II-XII grossly intact. · Gait - Antalgic gait. Ambulates without assistive device. · Motor: 3/5 muscle strength in bilateral hip flexion, knee flexion, knee extension, ankle dorsiflexion, and ankle plantar flexion. Pain limited weakness. · Sensory: LT sensation intact in lower limbs   · Reflexes: 2+ symmetrical in bilateral achilles, 2+ bilateral patellar, negative ankle clonus, downgoing babinski   Skin: No rashes or lesions present   Psychological: Cooperative, no exaggerated pain behaviors     Assessment:    Diagnosis Orders   1. SI (sacroiliac) joint dysfunction  HYDROcodone-acetaminophen (NORCO) 5-325 MG per tablet    CHG FLUOR NEEDLE/CATH SPINE/PARASPINAL DX/THER ADDON    [place] INJECT SI JOINT ARTHRGRPHY&/ANES/STEROID W/IMAGE      2. Syrinx of spinal cord ([facility] [address]        3. Chronic pain syndrome        4. Lumbar spondylosis        5. Facet arthropathy            Lisandro La is a 43 y. o.female presenting to the pain clinic for evaluation of low back pain. Patient's history and physical consistent with bilateral SI joint dysfunction. I have set her up for a bilateral SI joint injection with Dr. Josue Rivera. We discussed that in addition to this, she has lumbar facet mediated pain and would likely benefit from lumbar facet medial branch blocks to RFA. We will pursue the SI joint injection first as she will then likely require decompression surgery for her syrinx. We can then reconvene in regards to other injections of for her low back.   In the meantime I have stopped her tramadol and started her on Norco 5-325 mg twice daily as needed severe pain.  We discussed pursuing other muscle relaxers or NSAIDs in the future. I have taken her off work until her follow-up with neurosurgery. They can develop a plan as far as surgical intervention and potential disability, as I will not be filling out disability paperwork. Patient states she does have tramadol remaining at home. She will bring this with her at her next visit in this office so that we can discard of it. We discussed warning signs that would require emergent care such as increased leg weakness, loss of balance, or bowel/bladder incontinence. Plan: The following treatment recommendations and plan were discussed in detail with Raven Thompson. Imaging:   I have reviewed patients imaging of lumbar and thoracic MRI and results were discussed with patient today. Analgesics: The patient is currently managing their pain with the use of Tramadol which is reletively ineffecitve for her. I have taken over prescribing and started her on Norco 5-325 mg BID PRN. OARRS reviewed  Pain contract signed. The side effect profile of long-term opioid use was discussed and recommended emphasis on multimodal strategies for pain management. Patient is taking Acetaminophen. Patient informed that the maximum amount of acetaminophen taken on a regular basis should only be 4000 mg per day. Patient is taking Mobic. Patient is advised to take as prescribed and not take on an empty stomach. Adjuvants:   None    Interventions: With examination consistent with bilateral sacroiliac dysfunction/pain, we will proceed with a bilateral sacroiliac joint injection. The risks and benefits were discussed in detail with the patient. Patient wants to proceed with the injection. Anticoagulation/NPO Recommendations:   Patient does not need to hold any medications prior to the procedure. Patient will need to be NPO x 8 hours prior to the procedure.   We will start an IV prior to the procedure  Patient will need a     Multidisciplinary Pain Management:   In the presence of complex, chronic, and multi-factorial pain, the importance of a multidisciplinary approach to pain management in the patients management regimen was emphasized and discussed in great detail. PHYSICAL THERAPY: Patient is advised to see a physical therapist for gentle stretching exercises and conditioning exercises for management of pain. PSYCHOLOGY: Patient is advised to see a clinical pain psychologist, for the psychosocial aspect of pain care through coping skills, relaxation strategies, cognitive group therapy etc.   OBESITY: Patient is advised to seek nutrition consult to help in managing their weight to decrease its impact on pain.      Referrals:  None    Prescriptions Written This Visit:   Norco 5-325 mg (#60, 0 refills)    Follow-up: B/L SI injection    ELIZABETH Bowling - CNP

## 2022-11-10 ENCOUNTER — TELEPHONE (OUTPATIENT)
Dept: PHYSICAL MEDICINE AND REHAB | Age: 42
End: 2022-11-10

## 2022-11-10 ENCOUNTER — OFFICE VISIT (OUTPATIENT)
Dept: PHYSICAL MEDICINE AND REHAB | Age: 42
End: 2022-11-10
Payer: COMMERCIAL

## 2022-11-10 VITALS
WEIGHT: 186.8 LBS | BODY MASS INDEX: 34.37 KG/M2 | DIASTOLIC BLOOD PRESSURE: 78 MMHG | SYSTOLIC BLOOD PRESSURE: 118 MMHG | HEIGHT: 62 IN

## 2022-11-10 DIAGNOSIS — G95.0 SYRINX OF SPINAL CORD (HCC): ICD-10-CM

## 2022-11-10 DIAGNOSIS — M47.819 FACET ARTHROPATHY: ICD-10-CM

## 2022-11-10 DIAGNOSIS — M47.816 LUMBAR SPONDYLOSIS: ICD-10-CM

## 2022-11-10 DIAGNOSIS — G89.4 CHRONIC PAIN SYNDROME: ICD-10-CM

## 2022-11-10 DIAGNOSIS — M53.3 SI (SACROILIAC) JOINT DYSFUNCTION: Primary | ICD-10-CM

## 2022-11-10 PROCEDURE — G8417 CALC BMI ABV UP PARAM F/U: HCPCS | Performed by: NURSE PRACTITIONER

## 2022-11-10 PROCEDURE — G8427 DOCREV CUR MEDS BY ELIG CLIN: HCPCS | Performed by: NURSE PRACTITIONER

## 2022-11-10 PROCEDURE — G8484 FLU IMMUNIZE NO ADMIN: HCPCS | Performed by: NURSE PRACTITIONER

## 2022-11-10 PROCEDURE — 1036F TOBACCO NON-USER: CPT | Performed by: NURSE PRACTITIONER

## 2022-11-10 PROCEDURE — 99204 OFFICE O/P NEW MOD 45 MIN: CPT | Performed by: NURSE PRACTITIONER

## 2022-11-10 RX ORDER — HYDROCODONE BITARTRATE AND ACETAMINOPHEN 5; 325 MG/1; MG/1
1 TABLET ORAL 2 TIMES DAILY PRN
Qty: 60 TABLET | Refills: 0 | Status: SHIPPED | OUTPATIENT
Start: 2022-11-10 | End: 2022-12-10

## 2022-11-10 RX ORDER — FREMANEZUMAB-VFRM 225 MG/1.5ML
INJECTION SUBCUTANEOUS
COMMUNITY
Start: 2022-10-19

## 2022-11-10 NOTE — LETTER
[address] [id number] [facility] [address] [facility]  Phone: [phone]  Fax: [phone] ELIZABETH Trimble CNP        November 10, 2022     Patient: [place] West   YOB: 1980   Date of Visit: 11/10/2022       To Whom It May Concern: It is my medical opinion that Shannen Pham should remain out of work until 12/02/2022. If you have any questions or concerns, please don't hesitate to call.     Sincerely,        ELIZABETH Kincaid CNP

## 2022-11-10 NOTE — TELEPHONE ENCOUNTER
I was writing her off until she has her follow up with neurosurgery on 12/01/2022 to discuss surgical options and see if they would like her to remain off work. I do not plan to keep her off long term.      Camilo Carreno, ELIZABETH - CNP

## 2022-11-10 NOTE — TELEPHONE ENCOUNTER
Patient is scheduled for procedure on 12/08/2022. She has a note to be off work until 12/02/2022. Patient is wondering if you can write her off until after her procedure.

## 2022-11-10 NOTE — LETTER
[address] [facility]  [address] [facility] [address] [place] [address]  Phone: [phone]  Fax: [phone] ELIZABETH Wiseman CNP        November 10, 2022     Patient: Etta Singleton   YOB: 1980   Date of Visit: 11/10/2022       To Whom It May Concern: It is my medical opinion that Mario Reilly should remain out of work until 11/23/2022. If you have any questions or concerns, please don't hesitate to call.     Sincerely,        Nayan Evans, ELIZABETH - CNP

## 2022-12-01 ENCOUNTER — OFFICE VISIT (OUTPATIENT)
Dept: NEUROSURGERY | Age: 42
End: 2022-12-01
Payer: COMMERCIAL

## 2022-12-01 ENCOUNTER — TELEPHONE (OUTPATIENT)
Dept: PHYSICAL MEDICINE AND REHAB | Age: 42
End: 2022-12-01

## 2022-12-01 VITALS
HEART RATE: 75 BPM | SYSTOLIC BLOOD PRESSURE: 122 MMHG | HEIGHT: 62 IN | BODY MASS INDEX: 34.36 KG/M2 | DIASTOLIC BLOOD PRESSURE: 79 MMHG | WEIGHT: 186.73 LBS

## 2022-12-01 DIAGNOSIS — G95.0 SYRINX (HCC): ICD-10-CM

## 2022-12-01 DIAGNOSIS — M54.16 LUMBAR RADICULOPATHY: Primary | ICD-10-CM

## 2022-12-01 PROCEDURE — G8417 CALC BMI ABV UP PARAM F/U: HCPCS | Performed by: PHYSICIAN ASSISTANT

## 2022-12-01 PROCEDURE — G8427 DOCREV CUR MEDS BY ELIG CLIN: HCPCS | Performed by: PHYSICIAN ASSISTANT

## 2022-12-01 PROCEDURE — G8484 FLU IMMUNIZE NO ADMIN: HCPCS | Performed by: PHYSICIAN ASSISTANT

## 2022-12-01 PROCEDURE — 1036F TOBACCO NON-USER: CPT | Performed by: PHYSICIAN ASSISTANT

## 2022-12-01 PROCEDURE — 99213 OFFICE O/P EST LOW 20 MIN: CPT | Performed by: PHYSICIAN ASSISTANT

## 2022-12-01 RX ORDER — VENLAFAXINE HYDROCHLORIDE 150 MG/1
150 CAPSULE, EXTENDED RELEASE ORAL DAILY
COMMUNITY
Start: 2022-09-06

## 2022-12-01 RX ORDER — OMEPRAZOLE 40 MG/1
40 CAPSULE, DELAYED RELEASE ORAL DAILY
COMMUNITY
Start: 2022-11-30

## 2022-12-01 RX ORDER — MELOXICAM 15 MG/1
15 TABLET ORAL DAILY
COMMUNITY
Start: 2022-09-30

## 2022-12-01 RX ORDER — VENLAFAXINE HYDROCHLORIDE 75 MG/1
75 CAPSULE, EXTENDED RELEASE ORAL DAILY
COMMUNITY
Start: 2022-09-06

## 2022-12-01 RX ORDER — BUSPIRONE HYDROCHLORIDE 5 MG/1
5 TABLET ORAL DAILY
COMMUNITY
Start: 2022-09-06

## 2022-12-01 NOTE — PROGRESS NOTES
[facility] [address] [facility] [address]  Dept: [phone]  Dept Fax: [phone]  Loc: [phone]    Follow-up Visit  Visit Date: 12/1/2022      Prashant Thomas  is a 43 y.o. female who is returning to the office today for a follow-up visit for low back pain secondary to lumbar disc herniation. She was last seen and evaluated in our office setting on 10/13/2022 as a referral from pain management for evaluation of radiating low back pain bilaterally to the lower extremities. An MRI imaged on 9/7/2022 revealed the presence of hemangiomas along with degenerative disc disease small signal abnormality in the central conus suggestive of possible syrinx. As of the time of the last evaluation she had not been seen or evaluated by pain management and the actual MRIs were unavailable for direct view. Additional imaging in the form of an MRI of the thoracic spine was ordered to rule out additional abnormalities along with a request for the original MRI of the lumbar spine. She arrives today having undergone MRI of the thoracic spine with and without contrast on 11/9/2022 reveals syrinx of the thoracic spine at T8/9 to 1.8 mm ventral in the left sided disc protrusion at T8-9 attributing to mild canal stenosis with no cord compression. An additional syrinx within the spinal cord from T4-T6 and again at T12 with no abnormal enhancement is noted. Presents today with ongoing pain having been recently seen and evaluated by pain management with upcoming appointments for injection therapies pending. A discussion was initiated by Dr. Ester Nicohls regarding the necessity for additional imaging studies to rule out a cause for the thoracic and lumbar syrinx.   Patient confirmed history for migraine type headache along with issues with imbalance that are concerning for possible Chiari malformation (a possible cause for the syrinx) additional

## 2022-12-01 NOTE — TELEPHONE ENCOUNTER
If neurosurgery feels she needs to remain off work that is up to them. I will not take her off work longer. I can write off work the day of procedures and the day after if needed.      Xuan Painter, APRN - CNP

## 2022-12-01 NOTE — TELEPHONE ENCOUNTER
Pt. Wants to know if you are able to extend her time off work. She did see Ryan Stuart today and he is planning more testing and is still having pain.    Please advise, Thanks

## 2022-12-02 ENCOUNTER — TELEPHONE (OUTPATIENT)
Dept: PHYSICAL MEDICINE AND REHAB | Age: 42
End: 2022-12-02

## 2022-12-02 ENCOUNTER — PREP FOR PROCEDURE (OUTPATIENT)
Dept: PHYSICAL MEDICINE AND REHAB | Age: 42
End: 2022-12-02

## 2022-12-02 NOTE — TELEPHONE ENCOUNTER
Pt also requesting a work release letter. From your previous notes said will ok thru 12/1 when see Frank Hoskins. Will write letter for you to sign.

## 2022-12-07 NOTE — H&P
Today, patient presents for planned bilateral sacroiliac joint injections. This note is reflective of the patient's previous visit for evaluation. We will proceed with today's planned procedure. Since patient's last visit for evaluation, there have been no interval changes in medical history. Patient has no new numbness, weakness, or focal neurological deficit since evaluation. Patient has no contraindications to injection (no anticoagulation or recent antibiotic intake for active infections), and has a  present or is able to drive themselves (as discussed and cleared by physician). Allergies to latex, contrast dye, and steroid medications have been confirmed with the patient prior to the procedure. NPO necessity has been assessed and accepted based on procedure complexity. The risks and benefits of the procedure have been explained including but are not limited to infection, bleeding, paralysis, immediate post procedure weakness, and dizziness; the patient acknowledges understanding and desires to proceed with the procedure. Patient has signed consent for same procedure as discussed in previous clinic encounter. All other questions and concerns were addressed at bedside. See procedure note for full details. Post procedure Instructions: The patient was advised not to drive during the day of the procedure and not to engage in any significant decision making (unless otherwise states by physician). The patient was also advised to be cautious with walking/activity for 24 hours following today's visit and asked not to engage in over-exertion (unless otherwise states by physician). After this time, it is ok to resume pre-procedure level of activity. Patient advised to apply ice to site of injection in situations of pain and discomfort. Patient advised to not submerge site of injection during bath or pool activities for approximately 24 hours post-procedure.  Patient attested to understanding post procedure directions / restrictions. All other questions and concerns addressed before patient discharge in ambulatory fashion. Chronic Pain/PM&R Clinic Note     Encounter Date: 11/10/22    Subjective:   Chief Complaint:   No chief complaint on file. History of Present Illness:   Lyndsay Danielle is a 43 y.o. female seen in the clinic initially on 11/10/2022 upon request from [place], [place]  for her history of low back pain. Patient states her pain started about one year ago without any inciting event. She states she does work in [employer] which has required her to lift 40-60 pounds at one time. She wonders if this repetitive work has lead to her current issues. She states her pain has been getting gradually worse. Pain is aggravated by sitting, walking or standing for any amount of time. She states she has not found anything to help with her pain. She does get some pain radiating into the posterior aspect of bilateral thighs. She denies history of falls. She does not use an assistive device for ambulation. She does not notice trouble with balance but does feels like she has tripped over her own feet more recently. She states she has not been sleeping well due to not being able to gt comfortable. She pursued physical therapy at [place] a couple months ago. Physical therapy aggravated her pain significantly. She does feel like she has weakness in her legs, the left greater than her right. She states she cannot lift her legs due to pain in her back. She denies any saddle anesthesia or bowel/bladder incontinence. Of note, she had a thoracic MRI yesterday and is slated to see Jalyn Stephen with neurosurgery on 12/01/2022 to review and discuss potential surgical intervention.      History of Interventions:   Surgery: No previous lumbar surgeries  Injections: None    Current Treatment Medications:   Tramadol 50 mg TID PRN - ineffective  Ibuprofen 800 mg - ineffective  Mobic - ineffective    Historical Treatment Medications:   Tylenol - ineffective    Imaging: Will request Lumbar xray and MRI report. Past Medical History:   Diagnosis Date    Asthma        Past Surgical History:   Procedure Laterality Date     SECTION      times 4    KNEE SURGERY      times 2       Family History   Problem Relation Age of Onset    No Known Problems Mother     No Known Problems Father        Medications & Allergies:   Current Outpatient Medications   Medication Instructions    acetaminophen (TYLENOL) 500 mg, Oral, EVERY 6 HOURS PRN    AJOVY 225 MG/1.5ML SOSY No dose, route, or frequency recorded. albuterol (PROVENTIL) 2.5 mg, Nebulization, EVERY 6 HOURS PRN    albuterol sulfate  (90 Base) MCG/ACT inhaler 2 puffs, Inhalation, EVERY 6 HOURS PRN    aspirin 81 mg, Oral, EVERY 6 HOURS PRN    busPIRone (BUSPAR) 5 mg, Oral, DAILY    HYDROcodone-acetaminophen (NORCO) 5-325 MG per tablet 1 tablet, Oral, 2 TIMES DAILY PRN    ibuprofen (ADVIL;MOTRIN) 800 mg, Oral, EVERY 6 HOURS PRN    meloxicam (MOBIC) 15 mg, Oral, DAILY    metoprolol succinate (TOPROL XL) 25 mg, Oral, DAILY    omeprazole (PRILOSEC) 40 mg, Oral, DAILY    SUMAtriptan (IMITREX) 100 mg, Oral, ONCE    venlafaxine (EFFEXOR XR) 150 mg, Oral, DAILY    venlafaxine (EFFEXOR XR) 75 mg, Oral, DAILY       Allergies   Allergen Reactions    Methylprednisolone        Review of Systems:   Constitutional: negative for weight changes or fevers  Genitourinary: negative for bowel/bladder incontinence   Musculoskeletal: positive for low back pain, bilateral leg pain  Neurological: positive for bilateral leg weakness. Negative for leg numbness/tingling  Behavioral/Psych: negative for anxiety/depression   All other systems reviewed and are negative    Objective: There were no vitals filed for this visit.     Constitutional: Pleasant, no acute distress   Head: Normocephalic, atraumatic   Eyes: Conjunctivae normal   Neck: Supple, symmetrical   Lungs: Normal respiratory effort, non-labored breathing   Cardiovascular: Limbs warm and well perfused   Abdomen: Non-protruded   Musculoskeletal: Muscle bulk symmetric, no atrophy, no gross deformities   · Lower Extremities: ROM WNL. · Thorax: No paraspinal tenderness bilaterally. No scoliosis or kyphosis. · Lumbar Spine: ROM limited by pain. Lumbar paraspinals tender to palpation bilaterally. SLR neg bilaterally. DANIELLE positive bilaterally. GAENSLEN positive bilaterally. Positive SI distraction bilaterally. Positive facet loading bilaterally. Bilateral SI joints tender to palpation. Bilateral greater trochanters non-tender to palpation. Neurological: Cranial nerves II-XII grossly intact. · Gait - Antalgic gait. Ambulates without assistive device. · Motor: 3/5 muscle strength in bilateral hip flexion, knee flexion, knee extension, ankle dorsiflexion, and ankle plantar flexion. Pain limited weakness. · Sensory: LT sensation intact in lower limbs   · Reflexes: 2+ symmetrical in bilateral achilles, 2+ bilateral patellar, negative ankle clonus, downgoing babinski   Skin: No rashes or lesions present   Psychological: Cooperative, no exaggerated pain behaviors     Assessment:    Diagnosis Orders   1. SI (sacroiliac) joint dysfunction  HYDROcodone-acetaminophen (NORCO) 5-325 MG per tablet    CHG FLUOR NEEDLE/CATH SPINE/PARASPINAL DX/THER ADDON    [place] INJECT SI JOINT ARTHRGRPHY&/ANES/STEROID W/IMAGE      2. Syrinx of spinal cord ([facility] [address]        3. Chronic pain syndrome        4. Lumbar spondylosis        5. Facet arthropathy            Jose Roberts is a 43 y. o.female presenting to the pain clinic for evaluation of low back pain. Patient's history and physical consistent with bilateral SI joint dysfunction. I have set her up for a bilateral SI joint injection with Dr. Merlinda Eke. We discussed that in addition to this, she has lumbar facet mediated pain and would likely benefit from lumbar facet medial branch blocks to RFA.   We will pt has a 102.1fever pursue the SI joint injection first as she will then likely require decompression surgery for her syrinx. We can then reconvene in regards to other injections of for her low back. In the meantime I have stopped her tramadol and started her on Norco 5-325 mg twice daily as needed severe pain. We discussed pursuing other muscle relaxers or NSAIDs in the future. I have taken her off work until her follow-up with neurosurgery. They can develop a plan as far as surgical intervention and potential disability, as I will not be filling out disability paperwork. Patient states she does have tramadol remaining at home. She will bring this with her at her next visit in this office so that we can discard of it. We discussed warning signs that would require emergent care such as increased leg weakness, loss of balance, or bowel/bladder incontinence. Plan: The following treatment recommendations and plan were discussed in detail with Fanta Ying. Imaging:   I have reviewed patients imaging of lumbar and thoracic MRI and results were discussed with patient today. Analgesics: The patient is currently managing their pain with the use of Tramadol which is reletively ineffecitve for her. I have taken over prescribing and started her on Norco 5-325 mg BID PRN. OARRS reviewed  Pain contract signed. The side effect profile of long-term opioid use was discussed and recommended emphasis on multimodal strategies for pain management. Patient is taking Acetaminophen. Patient informed that the maximum amount of acetaminophen taken on a regular basis should only be 4000 mg per day. Patient is taking Mobic. Patient is advised to take as prescribed and not take on an empty stomach. Adjuvants:   None    Interventions: With examination consistent with bilateral sacroiliac dysfunction/pain, we will proceed with a bilateral sacroiliac joint injection.   The risks and benefits were discussed in detail with the patient. Patient wants to proceed with the injection. Anticoagulation/NPO Recommendations:   Patient does not need to hold any medications prior to the procedure. Patient will need to be NPO x 8 hours prior to the procedure. We will start an IV prior to the procedure  Patient will need a     Multidisciplinary Pain Management:   In the presence of complex, chronic, and multi-factorial pain, the importance of a multidisciplinary approach to pain management in the patients management regimen was emphasized and discussed in great detail. PHYSICAL THERAPY: Patient is advised to see a physical therapist for gentle stretching exercises and conditioning exercises for management of pain. PSYCHOLOGY: Patient is advised to see a clinical pain psychologist, for the psychosocial aspect of pain care through coping skills, relaxation strategies, cognitive group therapy etc.   OBESITY: Patient is advised to seek nutrition consult to help in managing their weight to decrease its impact on pain.      Referrals:  None    Prescriptions Written This Visit:   Norco 5-325 mg (#60, 0 refills)    Follow-up: B/L SI injection    Cristian Brand DO

## 2022-12-07 NOTE — DISCHARGE INSTRUCTIONS

## 2022-12-08 ENCOUNTER — APPOINTMENT (OUTPATIENT)
Dept: GENERAL RADIOLOGY | Age: 42
End: 2022-12-08
Attending: ANESTHESIOLOGY
Payer: COMMERCIAL

## 2022-12-08 ENCOUNTER — HOSPITAL ENCOUNTER (OUTPATIENT)
Age: 42
Setting detail: OUTPATIENT SURGERY
Discharge: HOME | End: 2022-12-08
Attending: ANESTHESIOLOGY | Admitting: ANESTHESIOLOGY
Payer: COMMERCIAL

## 2022-12-08 VITALS
HEART RATE: 67 BPM | BODY MASS INDEX: 33.86 KG/M2 | RESPIRATION RATE: 16 BRPM | WEIGHT: 184 LBS | SYSTOLIC BLOOD PRESSURE: 131 MMHG | OXYGEN SATURATION: 97 % | HEIGHT: 62 IN | DIASTOLIC BLOOD PRESSURE: 59 MMHG | TEMPERATURE: 96.7 F

## 2022-12-08 PROCEDURE — 2500000003 HC RX 250 WO HCPCS: Performed by: ANESTHESIOLOGY

## 2022-12-08 PROCEDURE — 7100000010 HC PHASE II RECOVERY - FIRST 15 MIN: Performed by: ANESTHESIOLOGY

## 2022-12-08 PROCEDURE — 6360000002 HC RX W HCPCS: Performed by: ANESTHESIOLOGY

## 2022-12-08 PROCEDURE — 3209999900 FLUORO FOR SURGICAL PROCEDURES

## 2022-12-08 PROCEDURE — 27096 INJECT SACROILIAC JOINT: CPT | Performed by: ANESTHESIOLOGY

## 2022-12-08 PROCEDURE — 7100000011 HC PHASE II RECOVERY - ADDTL 15 MIN: Performed by: ANESTHESIOLOGY

## 2022-12-08 PROCEDURE — 99152 MOD SED SAME PHYS/QHP 5/>YRS: CPT | Performed by: ANESTHESIOLOGY

## 2022-12-08 PROCEDURE — 6360000004 HC RX CONTRAST MEDICATION: Performed by: ANESTHESIOLOGY

## 2022-12-08 PROCEDURE — 2709999900 HC NON-CHARGEABLE SUPPLY: Performed by: ANESTHESIOLOGY

## 2022-12-08 PROCEDURE — 3600000054 HC PAIN LEVEL 3 BASE: Performed by: ANESTHESIOLOGY

## 2022-12-08 RX ORDER — LIDOCAINE HYDROCHLORIDE 10 MG/ML
INJECTION, SOLUTION EPIDURAL; INFILTRATION; INTRACAUDAL; PERINEURAL PRN
Status: DISCONTINUED | OUTPATIENT
Start: 2022-12-08 | End: 2022-12-08 | Stop reason: ALTCHOICE

## 2022-12-08 RX ORDER — MIDAZOLAM HYDROCHLORIDE 1 MG/ML
INJECTION INTRAMUSCULAR; INTRAVENOUS PRN
Status: DISCONTINUED | OUTPATIENT
Start: 2022-12-08 | End: 2022-12-08 | Stop reason: ALTCHOICE

## 2022-12-08 RX ORDER — BUPIVACAINE HYDROCHLORIDE 2.5 MG/ML
INJECTION, SOLUTION EPIDURAL; INFILTRATION; INTRACAUDAL PRN
Status: DISCONTINUED | OUTPATIENT
Start: 2022-12-08 | End: 2022-12-08 | Stop reason: ALTCHOICE

## 2022-12-08 RX ORDER — METHYLPREDNISOLONE ACETATE 80 MG/ML
INJECTION, SUSPENSION INTRA-ARTICULAR; INTRALESIONAL; INTRAMUSCULAR; SOFT TISSUE PRN
Status: DISCONTINUED | OUTPATIENT
Start: 2022-12-08 | End: 2022-12-08 | Stop reason: ALTCHOICE

## 2022-12-08 RX ORDER — FENTANYL CITRATE 50 UG/ML
INJECTION, SOLUTION INTRAMUSCULAR; INTRAVENOUS PRN
Status: DISCONTINUED | OUTPATIENT
Start: 2022-12-08 | End: 2022-12-08 | Stop reason: ALTCHOICE

## 2022-12-08 ASSESSMENT — PAIN - FUNCTIONAL ASSESSMENT: PAIN_FUNCTIONAL_ASSESSMENT: 0-10

## 2022-12-08 ASSESSMENT — PAIN DESCRIPTION - DESCRIPTORS: DESCRIPTORS: ACHING

## 2022-12-08 ASSESSMENT — PAIN SCALES - GENERAL: PAINLEVEL_OUTOF10: 5

## 2022-12-08 NOTE — PRE SEDATION
[facility]  Pre-Sedation/Analgesia History & Physical    Pt Name: Yogi Madrid  MRN: [id number]  YOB: 1980  Provider Performing Procedure: Rolando Urena DO   Primary Care Physician: Edie Velarde HISTORY       has a past medical history of Asthma. SURGICAL HISTORY   has a past surgical history that includes  section; knee surgery; and Tubal ligation. ALLERGIES   Allergies as of 11/10/2022 - Fully Reviewed 11/10/2022   Allergen Reaction Noted    Methylprednisolone  2019       MEDICATIONS   Prior to Admission medications    Medication Sig Start Date End Date Taking? Authorizing Provider   venlafaxine (EFFEXOR XR) 150 MG extended release capsule Take 150 mg by mouth daily 22   Historical Provider, MD   venlafaxine (EFFEXOR XR) 75 MG extended release capsule Take 75 mg by mouth daily 22   Historical Provider, MD   busPIRone (BUSPAR) 5 MG tablet Take 5 mg by mouth daily 22   Historical Provider, MD   meloxicam (MOBIC) 15 MG tablet Take 15 mg by mouth daily 22   Historical Provider, MD   omeprazole (PRILOSEC) 40 MG delayed release capsule Take 40 mg by mouth daily 22   Historical Provider, MD   AJOVY 225 MG/1.5ML SOSY  10/19/22   Historical Provider, MD   HYDROcodone-acetaminophen (NORCO) 5-325 MG per tablet Take 1 tablet by mouth 2 times daily as needed for Pain for up to 30 days.  11/10/22 12/10/22  ELIZABETH Bowling - CNP   metoprolol succinate (TOPROL XL) 25 MG extended release tablet Take 1 tablet by mouth daily 3/10/22   Gladys Culp MD   albuterol (PROVENTIL) (2.5 MG/3ML) 0.083% nebulizer solution Take 2.5 mg by nebulization every 6 hours as needed for Wheezing    Historical Provider, MD   aspirin 81 MG EC tablet Take 1 tablet by mouth every 6 hours as needed for Pain 3/17/21   Gladys Culp MD   SUMAtriptan (IMITREX) 50 MG tablet Take 100 mg by mouth once    Historical Provider, MD   acetaminophen (TYLENOL) 500 MG tablet Take 500 mg by mouth every 6 hours as needed for Pain    Historical Provider, MD   albuterol sulfate  (90 Base) MCG/ACT inhaler Inhale 2 puffs into the lungs every 6 hours as needed for Wheezing    Historical Provider, MD   ibuprofen (ADVIL;MOTRIN) 800 MG tablet Take 800 mg by mouth every 6 hours as needed for Pain    Historical Provider, MD     PHYSICAL:   Vitals:    12/08/22 1026   BP: (!) 131/59   Pulse: 67   Resp: 16   Temp: (!) 96.7 °F (35.9 °C)   SpO2: 97%     PLANNED PROCEDURE   See procedure note  SEDATION  Planned agent: Versed and Fentanyl  ASA Classification: 1  Class 1: A normal healthy patient  Class 2: Pt with mild to moderate systemic disease  Class 3: Severe systemic disease or disturbance  Class 4: Severe systemic disorders that are already life threatening. Class 5: Moribund pt with little chances of survival, for more than 24 hours. Mallampati I Airway Classification: 1    1. Pre-procedure diagnostic studies complete and results available. 2. Previous sedation/anesthesia experiences assessed. 3. The patient is an appropriate candidate to undergo the planned procedure sedation and anesthesia. (Refer to nursing sedation/analgesia documentation record)  4. Formulation and discussion of sedation/procedure plan, risks, and expectations with patient and/or responsible adult completed. 5. Patient examined immediately prior to the procedure.  (Refer to nursing sedation/analgesia documentation record)    Larry Lilly DO  Electronically signed 12/8/2022 at 11:27 AM

## 2022-12-08 NOTE — PROGRESS NOTES
1026: Patient to Phase II Recovery via bed in stable condition on room air. 1028: Patient requesting snack at this time. 1039: IV removed. No complications. 1045: Patient getting dressed. 1052: Patient discharged.

## 2022-12-08 NOTE — PROCEDURES
Pre-operative Diagnosis:  SI joint pain     Post-operative Diagnosis:  SI joint pain     Procedure: Bilateral SI joint injection     Procedure Description:  After having signed the informed consent, the patient was placed in the prone position. The patient's back was prepped with chloraprep solution, and draped in a sterile fashion. A total of 2 ml of 1% lidocaine were used to anesthetize the skin and underlying tissues. Under fluoroscopic guidance a single 22-gauge, 3.5 inch spinal needle was advanced to lie within the inferior pole of the RIGHT sacroiliac joint. There were no paresthesias or heme aspiration. Needle placement was confirmed in the AP view. After negative aspiration, 0.5 ml of Omnipaque 300 contrast was injected with appropriate spread observed. A total of 2 ml of 0.25% bupivicaine mixed with 40 mg depo-medrol were injected into the sacroiliac joint. The needle was withdrawn without any complications. This exact procedure was repeated on the contralateral side. The patient tolerated the procedure well, was transported to the recovery room and observed for 15 minutes and discharged in an ambulatory fashion. No immediate reported complications.     Procedural Complications: None  Estimated Blood Loss: 0 mL    IV sedation was used during the procedure:  - Moderate intravenous conscious sedation was supervised by Dr. Tyesha Melgar  - The patient was independently monitored by a Registered Nurse assigned to the procedure room  - Monitoring included automated blood pressure, continuous EKG, and continuous pulse oximetry  - The detailed conscious record is permanently stored in the [place] [address]  - The following is the conscious sedation record:  Start Time: 10:16  End Time : 10:31  Duration: 15 minutes   Medications Administered: 3 mg Versed, 50 mcg Fentanyl      Cristian Meade DO  Interventional Pain Management/PM&R   [place]

## 2022-12-08 NOTE — POST SEDATION
[address] [address]  Sedation/Analgesia Post Sedation Record    Pt Name: Ian Edouard  MRN: [id number]  YOB: 1980  Procedure Performed By: Jose Angel Goel DO  Primary Care Physician: Meenakshi Romero    POST-PROCEDURE    Physicians/Assistants: Jose Angel Goel DO  Procedure Performed: See Procedure Note   Sedation/Anesthesia: Versed and Fentanyl (See procedure note for amount and duration)  Estimated Blood Loss:     0  ml  Specimens Removed: None        Complications: None           Cristian Lacey DO  Electronically signed 12/8/2022 at 11:27 AM

## 2022-12-22 ENCOUNTER — HOSPITAL ENCOUNTER (OUTPATIENT)
Dept: MRI IMAGING | Age: 42
Discharge: HOME | End: 2022-12-22
Payer: COMMERCIAL

## 2022-12-22 ENCOUNTER — OFFICE VISIT (OUTPATIENT)
Dept: PHYSICAL MEDICINE AND REHAB | Age: 42
End: 2022-12-22

## 2022-12-22 VITALS
HEIGHT: 62 IN | WEIGHT: 184 LBS | SYSTOLIC BLOOD PRESSURE: 130 MMHG | BODY MASS INDEX: 33.86 KG/M2 | DIASTOLIC BLOOD PRESSURE: 82 MMHG

## 2022-12-22 DIAGNOSIS — G95.0 SYRINX (HCC): ICD-10-CM

## 2022-12-22 DIAGNOSIS — M47.819 FACET ARTHROPATHY: ICD-10-CM

## 2022-12-22 DIAGNOSIS — M47.816 LUMBAR SPONDYLOSIS: Primary | ICD-10-CM

## 2022-12-22 DIAGNOSIS — G89.4 CHRONIC PAIN SYNDROME: ICD-10-CM

## 2022-12-22 DIAGNOSIS — G95.0 SYRINX OF SPINAL CORD (HCC): ICD-10-CM

## 2022-12-22 DIAGNOSIS — M53.3 SI (SACROILIAC) JOINT DYSFUNCTION: ICD-10-CM

## 2022-12-22 PROCEDURE — 70551 MRI BRAIN STEM W/O DYE: CPT

## 2022-12-22 PROCEDURE — 72148 MRI LUMBAR SPINE W/O DYE: CPT

## 2022-12-22 PROCEDURE — 72146 MRI CHEST SPINE W/O DYE: CPT

## 2022-12-22 RX ORDER — PREGABALIN 75 MG/1
75 CAPSULE ORAL 2 TIMES DAILY
Qty: 60 CAPSULE | Refills: 0 | Status: SHIPPED | OUTPATIENT
Start: 2022-12-22 | End: 2023-01-21

## 2022-12-22 NOTE — PROGRESS NOTES
Chronic Pain/PM&R Clinic Note     Encounter Date: 12/22/22    Subjective:   Chief Complaint:   Chief Complaint   Patient presents with    Follow-up     Lower back pain. Would like to discuss getting on disability. History of Present Illness:   Harvey Rodriguez is a 43 y.o. female seen in the clinic initially on 11/10/2022 upon request from [place], [place]  for her history of low back pain. Patient states her pain started about one year ago without any inciting event. She states she does work in [employer] which has required her to lift 40-60 pounds at one time. She wonders if this repetitive work has lead to her current issues. She states her pain has been getting gradually worse. Pain is aggravated by sitting, walking or standing for any amount of time. She states she has not found anything to help with her pain. She does get some pain radiating into the posterior aspect of bilateral thighs. She denies history of falls. She does not use an assistive device for ambulation. She does not notice trouble with balance but does feels like she has tripped over her own feet more recently. She states she has not been sleeping well due to not being able to gt comfortable. She pursued physical therapy at [place] a couple months ago. Physical therapy aggravated her pain significantly. She does feel like she has weakness in her legs, the left greater than her right. She states she cannot lift her legs due to pain in her back. She denies any saddle anesthesia or bowel/bladder incontinence. Of note, she had a thoracic MRI yesterday and is slated to see Chava Solis with neurosurgery on 12/01/2022 to review and discuss potential surgical intervention. Today, 12/22/2022, patient presents for planned follow up on chronic low back pain. Patient underwent the bilateral SI joint injection on 12/08/2022 and noticed some relief form about one day.  She states she has really been having a hard time with staying active and especially at work. She is asking about getting disability. She states neurosurgery is doing more testing to see if there is a cause for her syrinx or if it was spontaneous. She states the Norco does not help at all. She has been taking the Tramadol and Norco and they have not been helpful. She denies any new symptoms at this point. History of Interventions:   Surgery: No previous lumbar surgeries  Injections: B/L SI injection (12/08/2022) - moderate relief x one day    Current Treatment Medications:   Norco 5-325 mg BID PRN - ineffective  Ibuprofen - menstrual pain  Flexeril     Historical Treatment Medications:   Tylenol - ineffective  Tizanidine ? Alleve  Mobic - ineffective  Tramadol 50 mg TID PRN - ineffective    Imaging:  Thoracic MRI (11/09/2022)      Past Medical History:   Diagnosis Date    Asthma        Past Surgical History:   Procedure Laterality Date    BACK INJECTION Bilateral 12/8/2022    bilateral sacroiliac joint injection performed by Mio Fernandez, DO at [address] [facility]      times 4    KNEE SURGERY      times 2    TUBAL LIGATION         Family History   Problem Relation Age of Onset    No Known Problems Mother     No Known Problems Father        Medications & Allergies:   Current Outpatient Medications   Medication Instructions    acetaminophen (TYLENOL) 500 mg, Oral, EVERY 6 HOURS PRN    AJOVY 225 MG/1.5ML SOSY No dose, route, or frequency recorded.     albuterol (PROVENTIL) 2.5 mg, Nebulization, EVERY 6 HOURS PRN    albuterol sulfate  (90 Base) MCG/ACT inhaler 2 puffs, Inhalation, EVERY 6 HOURS PRN    aspirin 81 mg, Oral, EVERY 6 HOURS PRN    busPIRone (BUSPAR) 5 mg, Oral, DAILY    ibuprofen (ADVIL;MOTRIN) 800 mg, Oral, EVERY 6 HOURS PRN    meloxicam (MOBIC) 15 mg, Oral, DAILY    metoprolol succinate (TOPROL XL) 25 mg, Oral, DAILY    omeprazole (PRILOSEC) 40 mg, Oral, DAILY    pregabalin (LYRICA) 75 mg, Oral, 2 TIMES DAILY    SUMAtriptan (IMITREX) 100 mg, Oral, ONCE    venlafaxine (EFFEXOR XR) 150 mg, Oral, DAILY    venlafaxine (EFFEXOR XR) 75 mg, Oral, DAILY       Allergies   Allergen Reactions    Methylprednisolone        Review of Systems:   Constitutional: negative for weight changes or fevers  Genitourinary: negative for bowel/bladder incontinence   Musculoskeletal: positive for low back pain, bilateral leg pain  Neurological: positive for bilateral leg weakness. Negative for leg numbness/tingling  Behavioral/Psych: negative for anxiety/depression   All other systems reviewed and are negative    Objective:     Vitals:    12/22/22 0802   BP: 130/82     Constitutional: Pleasant, no acute distress   Head: Normocephalic, atraumatic   Eyes: Conjunctivae normal   Neck: Supple, symmetrical   Lungs: Normal respiratory effort, non-labored breathing   Cardiovascular: Limbs warm and well perfused   Abdomen: Non-protruded   Musculoskeletal: Muscle bulk symmetric, no atrophy, no gross deformities   · Lower Extremities: ROM WNL. · Thorax: No paraspinal tenderness bilaterally. No scoliosis or kyphosis. · Lumbar Spine: ROM limited by pain. Lumbar paraspinals tender to palpation bilaterally. SLR neg bilaterally. DANIELLE positive bilaterally. GAENSLEN positive bilaterally. Positive SI distraction bilaterally. Positive facet loading bilaterally. Bilateral SI joints tender to palpation. Bilateral greater trochanters non-tender to palpation. Neurological: Cranial nerves II-XII grossly intact. · Gait - Antalgic gait. Ambulates without assistive device. · Motor: 3/5 muscle strength in bilateral hip flexion, knee flexion, knee extension, ankle dorsiflexion, and ankle plantar flexion. Pain limited weakness.    · Sensory: LT sensation intact in lower limbs   · Reflexes: 2+ symmetrical in bilateral achilles, 2+ bilateral patellar, negative ankle clonus, downgoing babinski   Skin: No rashes or lesions present   Psychological: Cooperative, no exaggerated pain behaviors     Assessment:    Diagnosis Orders   1. Lumbar spondylosis  CHG FLUOR NEEDLE/CATH SPINE/PARASPINAL DX/THER ADDON    CT INJ DX/THER AGNT PARAVERT FACET JOINT, LUMBAR/SAC, 1ST LEVEL    CT INJ DX/THER AGNT PARAVERT FACET JOINT, LUMBAR/SAC, 2ND LEVEL    pregabalin (LYRICA) 75 MG capsule      2. Syrinx of spinal cord ([address]        3. Facet arthropathy        4. SI (sacroiliac) joint dysfunction        5. Chronic pain syndrome            Etta Singleton is a 43 y. o.female presenting to the pain clinic for evaluation of low back pain. She had a failed response to the bilateral SI injection. We discussed last visit that she also has lumbar facet mediated pain. I have set her up for a lumbar facet medial branch block at bilateral L4/5 and L5/S1. We discussed the potential of pursuing a lumbar RFA for more long standing relief. We discussed pros and cons of having surgical intervention. I have stopped her Noco and Tramadol as they are ineffective and I would not recommend increasing the medication due to the risk involved. She wiill bring the medication to her next visit or have her mother drop them off in our office. I have started her on Lyrica 75 mg nightly x 5 days then BID if tolerated. We discussed that I would not fill out disability paperwork. Plan: The following treatment recommendations and plan were discussed in detail with Etta Singleton. Imaging:   I have reviewed patients imaging of lumbar and thoracic MRI and results were discussed with patient today. Analgesics:   I have stopped her Norco and Tramadol due to being ineffective. Patient is taking Acetaminophen. Patient informed that the maximum amount of acetaminophen taken on a regular basis should only be 4000 mg per day. Patient is taking Mobic. Patient is advised to take as prescribed and not take on an empty stomach.      Adjuvants:   I have started her on Lyrica 75 mg nightly x 5 nights then BID if tolerated    Interventions: In presence of lumbar axial back pain and with physical exam consistent for facetal pain, the option of medial branch blocks at bilateral L4/5 and L5/S1 was chosen. The risks and benefits were discussed in detail with the patient. Patient wants to proceed with the injection. Anticoagulation/NPO Recommendations:   Patient needs to hold ibuprofen x 2 days prior to the procedure. Patient will need to be NPO x 8 hours prior to the procedure. We will start an IV prior to the procedure  Patient will need a     Multidisciplinary Pain Management:   In the presence of complex, chronic, and multi-factorial pain, the importance of a multidisciplinary approach to pain management in the patients management regimen was emphasized and discussed in great detail. PHYSICAL THERAPY: Patient is advised to see a physical therapist for gentle stretching exercises and conditioning exercises for management of pain. PSYCHOLOGY: Patient is advised to see a clinical pain psychologist, for the psychosocial aspect of pain care through coping skills, relaxation strategies, cognitive group therapy etc.   OBESITY: Patient is advised to seek nutrition consult to help in managing their weight to decrease its impact on pain.      Referrals:  None    Prescriptions Written This Visit:   Lyrica 75 mg (#60, 0 refills)    Follow-up: lumbar MBB, in office 1 week after    ELIZABETH Cerna - CNP

## 2023-01-06 ENCOUNTER — PREP FOR PROCEDURE (OUTPATIENT)
Dept: PHYSICAL MEDICINE AND REHAB | Age: 43
End: 2023-01-06

## 2023-01-10 NOTE — DISCHARGE INSTRUCTIONS

## 2023-01-11 NOTE — H&P
Today, patient presents for planned medial branch blocks at bilateral L4/5 and L5/S1    This note is reflective of the patient's previous visit for evaluation. We will proceed with today's planned procedure. Since patient's last visit for evaluation, there have been no interval changes in medical history. Patient has no new numbness, weakness, or focal neurological deficit since evaluation. Patient has no contraindications to injection (no anticoagulation or recent antibiotic intake for active infections), and has a  present or is able to drive themselves (as discussed and cleared by physician). Allergies to latex, contrast dye, and steroid medications have been confirmed with the patient prior to the procedure. NPO necessity has been assessed and accepted based on procedure complexity. The risks and benefits of the procedure have been explained including but are not limited to infection, bleeding, paralysis, immediate post procedure weakness, and dizziness; the patient acknowledges understanding and desires to proceed with the procedure. Patient has signed consent for same procedure as discussed in previous clinic encounter. All other questions and concerns were addressed at bedside. See procedure note for full details. Post procedure Instructions: The patient was advised not to drive during the day of the procedure and not to engage in any significant decision making (unless otherwise states by physician). The patient was also advised to be cautious with walking/activity for 24 hours following today's visit and asked not to engage in over-exertion (unless otherwise states by physician). After this time, it is ok to resume pre-procedure level of activity. Patient advised to apply ice to site of injection in situations of pain and discomfort. Patient advised to not submerge site of injection during bath or pool activities for approximately 24 hours post-procedure.  Patient attested to understanding post procedure directions / restrictions. All other questions and concerns addressed before patient discharge in ambulatory fashion. Chronic Pain/PM&R Clinic Note     Encounter Date: 12/22/22    Subjective:   Chief Complaint:   No chief complaint on file. History of Present Illness:   Carlos Santacruz is a 43 y.o. female seen in the clinic initially on 11/10/2022 upon request from [place], [place]  for her history of low back pain. Patient states her pain started about one year ago without any inciting event. She states she does work in [employer] which has required her to lift 40-60 pounds at one time. She wonders if this repetitive work has lead to her current issues. She states her pain has been getting gradually worse. Pain is aggravated by sitting, walking or standing for any amount of time. She states she has not found anything to help with her pain. She does get some pain radiating into the posterior aspect of bilateral thighs. She denies history of falls. She does not use an assistive device for ambulation. She does not notice trouble with balance but does feels like she has tripped over her own feet more recently. She states she has not been sleeping well due to not being able to gt comfortable. She pursued physical therapy at [facility] a couple months ago. Physical therapy aggravated her pain significantly. She does feel like she has weakness in her legs, the left greater than her right. She states she cannot lift her legs due to pain in her back. She denies any saddle anesthesia or bowel/bladder incontinence. Of note, she had a thoracic MRI yesterday and is slated to see Jose Hurt with neurosurgery on 12/01/2022 to review and discuss potential surgical intervention. Today, 12/22/2022, patient presents for planned follow up on chronic low back pain. Patient underwent the bilateral SI joint injection on 12/08/2022 and noticed some relief form about one day.  She states she has really been having a hard time with staying active and especially at work. She is asking about getting disability. She states neurosurgery is doing more testing to see if there is a cause for her syrinx or if it was spontaneous. She states the Norco does not help at all. She has been taking the Tramadol and Norco and they have not been helpful. She denies any new symptoms at this point. History of Interventions:   Surgery: No previous lumbar surgeries  Injections: B/L SI injection (12/08/2022) - moderate relief x one day    Current Treatment Medications:   Norco 5-325 mg BID PRN - ineffective  Ibuprofen - menstrual pain  Flexeril     Historical Treatment Medications:   Tylenol - ineffective  Tizanidine ? Alleve  Mobic - ineffective  Tramadol 50 mg TID PRN - ineffective    Imaging:  Thoracic MRI (11/09/2022)      Past Medical History:   Diagnosis Date    Asthma        Past Surgical History:   Procedure Laterality Date    BACK INJECTION Bilateral 12/8/2022    bilateral sacroiliac joint injection performed by Cassandra River DO at [address] [facility]      times 4    KNEE SURGERY      times 2    TUBAL LIGATION         Family History   Problem Relation Age of Onset    No Known Problems Mother     No Known Problems Father        Medications & Allergies:   Current Outpatient Medications   Medication Instructions    acetaminophen (TYLENOL) 500 mg, Oral, EVERY 6 HOURS PRN    AJOVY 225 MG/1.5ML SOSY No dose, route, or frequency recorded.     albuterol (PROVENTIL) 2.5 mg, Nebulization, EVERY 6 HOURS PRN    albuterol sulfate  (90 Base) MCG/ACT inhaler 2 puffs, Inhalation, EVERY 6 HOURS PRN    aspirin 81 mg, Oral, EVERY 6 HOURS PRN    busPIRone (BUSPAR) 5 mg, Oral, DAILY    ibuprofen (ADVIL;MOTRIN) 800 mg, Oral, EVERY 6 HOURS PRN    meloxicam (MOBIC) 15 mg, Oral, DAILY    metoprolol succinate (TOPROL XL) 25 mg, Oral, DAILY    omeprazole (PRILOSEC) 40 mg, Oral, DAILY    pregabalin (LYRICA) 75 mg, Oral, 2 TIMES DAILY    SUMAtriptan (IMITREX) 100 mg, Oral, ONCE    venlafaxine (EFFEXOR XR) 150 mg, Oral, DAILY    venlafaxine (EFFEXOR XR) 75 mg, Oral, DAILY       Allergies   Allergen Reactions    Methylprednisolone        Review of Systems:   Constitutional: negative for weight changes or fevers  Genitourinary: negative for bowel/bladder incontinence   Musculoskeletal: positive for low back pain, bilateral leg pain  Neurological: positive for bilateral leg weakness. Negative for leg numbness/tingling  Behavioral/Psych: negative for anxiety/depression   All other systems reviewed and are negative    Objective: There were no vitals filed for this visit. Constitutional: Pleasant, no acute distress   Head: Normocephalic, atraumatic   Eyes: Conjunctivae normal   Neck: Supple, symmetrical   Lungs: Normal respiratory effort, non-labored breathing   Cardiovascular: Limbs warm and well perfused   Abdomen: Non-protruded   Musculoskeletal: Muscle bulk symmetric, no atrophy, no gross deformities   · Lower Extremities: ROM WNL. · Thorax: No paraspinal tenderness bilaterally. No scoliosis or kyphosis. · Lumbar Spine: ROM limited by pain. Lumbar paraspinals tender to palpation bilaterally. SLR neg bilaterally. DANIELLE positive bilaterally. GAENSLEN positive bilaterally. Positive SI distraction bilaterally. Positive facet loading bilaterally. Bilateral SI joints tender to palpation. Bilateral greater trochanters non-tender to palpation. Neurological: Cranial nerves II-XII grossly intact. · Gait - Antalgic gait. Ambulates without assistive device. · Motor: 3/5 muscle strength in bilateral hip flexion, knee flexion, knee extension, ankle dorsiflexion, and ankle plantar flexion. Pain limited weakness.    · Sensory: LT sensation intact in lower limbs   · Reflexes: 2+ symmetrical in bilateral achilles, 2+ bilateral patellar, negative ankle clonus, downgoing babinski   Skin: No rashes or lesions present Psychological: Cooperative, no exaggerated pain behaviors     Assessment:    Diagnosis Orders   1. Lumbar spondylosis  CHG FLUOR NEEDLE/CATH SPINE/PARASPINAL DX/THER ADDON    VT INJ DX/THER AGNT PARAVERT FACET JOINT, LUMBAR/SAC, 1ST LEVEL    VT INJ DX/THER AGNT PARAVERT FACET JOINT, LUMBAR/SAC, 2ND LEVEL    pregabalin (LYRICA) 75 MG capsule      2. Syrinx of spinal cord ([address]        3. Facet arthropathy        4. SI (sacroiliac) joint dysfunction        5. Chronic pain syndrome            Ricci Hale is a 43 y. o.female presenting to the pain clinic for evaluation of low back pain. She had a failed response to the bilateral SI injection. We discussed last visit that she also has lumbar facet mediated pain. I have set her up for a lumbar facet medial branch block at bilateral L4/5 and L5/S1. We discussed the potential of pursuing a lumbar RFA for more long standing relief. We discussed pros and cons of having surgical intervention. I have stopped her Noco and Tramadol as they are ineffective and I would not recommend increasing the medication due to the risk involved. She wiill bring the medication to her next visit or have her mother drop them off in our office. I have started her on Lyrica 75 mg nightly x 5 days then BID if tolerated. We discussed that I would not fill out disability paperwork. Plan: The following treatment recommendations and plan were discussed in detail with Ricci Hale. Imaging:   I have reviewed patients imaging of lumbar and thoracic MRI and results were discussed with patient today. Analgesics:   I have stopped her Norco and Tramadol due to being ineffective. Patient is taking Acetaminophen. Patient informed that the maximum amount of acetaminophen taken on a regular basis should only be 4000 mg per day. Patient is taking Mobic. Patient is advised to take as prescribed and not take on an empty stomach.      Adjuvants:   I have started her on Lyrica 75 mg nightly x 5 nights then BID if tolerated    Interventions: In presence of lumbar axial back pain and with physical exam consistent for facetal pain, the option of medial branch blocks at bilateral L4/5 and L5/S1 was chosen. The risks and benefits were discussed in detail with the patient. Patient wants to proceed with the injection. Anticoagulation/NPO Recommendations:   Patient needs to hold ibuprofen x 2 days prior to the procedure. Patient will need to be NPO x 8 hours prior to the procedure. We will start an IV prior to the procedure  Patient will need a     Multidisciplinary Pain Management:   In the presence of complex, chronic, and multi-factorial pain, the importance of a multidisciplinary approach to pain management in the patients management regimen was emphasized and discussed in great detail. PHYSICAL THERAPY: Patient is advised to see a physical therapist for gentle stretching exercises and conditioning exercises for management of pain. PSYCHOLOGY: Patient is advised to see a clinical pain psychologist, for the psychosocial aspect of pain care through coping skills, relaxation strategies, cognitive group therapy etc.   OBESITY: Patient is advised to seek nutrition consult to help in managing their weight to decrease its impact on pain.      Referrals:  None    Prescriptions Written This Visit:   Lyrica 75 mg (#60, 0 refills)    Follow-up: lumbar MBB, in office 1 week after    Marcelina Donnelly DO

## 2023-01-12 ENCOUNTER — HOSPITAL ENCOUNTER (OUTPATIENT)
Age: 43
Setting detail: OUTPATIENT SURGERY
Discharge: HOME | End: 2023-01-12
Attending: ANESTHESIOLOGY | Admitting: ANESTHESIOLOGY
Payer: COMMERCIAL

## 2023-01-12 ENCOUNTER — APPOINTMENT (OUTPATIENT)
Dept: GENERAL RADIOLOGY | Age: 43
End: 2023-01-12
Attending: ANESTHESIOLOGY
Payer: COMMERCIAL

## 2023-01-12 VITALS
OXYGEN SATURATION: 97 % | WEIGHT: 186.2 LBS | BODY MASS INDEX: 34.27 KG/M2 | TEMPERATURE: 97.8 F | HEART RATE: 63 BPM | DIASTOLIC BLOOD PRESSURE: 80 MMHG | RESPIRATION RATE: 14 BRPM | HEIGHT: 62 IN | SYSTOLIC BLOOD PRESSURE: 124 MMHG

## 2023-01-12 PROCEDURE — 3209999900 FLUORO FOR SURGICAL PROCEDURES

## 2023-01-12 PROCEDURE — 3600000056 HC PAIN LEVEL 4 BASE: Performed by: ANESTHESIOLOGY

## 2023-01-12 PROCEDURE — 7100000011 HC PHASE II RECOVERY - ADDTL 15 MIN: Performed by: ANESTHESIOLOGY

## 2023-01-12 PROCEDURE — 2709999900 HC NON-CHARGEABLE SUPPLY: Performed by: ANESTHESIOLOGY

## 2023-01-12 PROCEDURE — 2500000003 HC RX 250 WO HCPCS: Performed by: ANESTHESIOLOGY

## 2023-01-12 PROCEDURE — 7100000010 HC PHASE II RECOVERY - FIRST 15 MIN: Performed by: ANESTHESIOLOGY

## 2023-01-12 PROCEDURE — 6360000002 HC RX W HCPCS: Performed by: ANESTHESIOLOGY

## 2023-01-12 PROCEDURE — 99152 MOD SED SAME PHYS/QHP 5/>YRS: CPT | Performed by: ANESTHESIOLOGY

## 2023-01-12 RX ORDER — MIDAZOLAM HYDROCHLORIDE 1 MG/ML
INJECTION INTRAMUSCULAR; INTRAVENOUS PRN
Status: DISCONTINUED | OUTPATIENT
Start: 2023-01-12 | End: 2023-01-12 | Stop reason: ALTCHOICE

## 2023-01-12 RX ORDER — FENTANYL CITRATE 50 UG/ML
INJECTION, SOLUTION INTRAMUSCULAR; INTRAVENOUS PRN
Status: DISCONTINUED | OUTPATIENT
Start: 2023-01-12 | End: 2023-01-12 | Stop reason: ALTCHOICE

## 2023-01-12 RX ORDER — LIDOCAINE HYDROCHLORIDE 10 MG/ML
INJECTION, SOLUTION EPIDURAL; INFILTRATION; INTRACAUDAL; PERINEURAL PRN
Status: DISCONTINUED | OUTPATIENT
Start: 2023-01-12 | End: 2023-01-12 | Stop reason: ALTCHOICE

## 2023-01-12 RX ORDER — BUPIVACAINE HYDROCHLORIDE 2.5 MG/ML
INJECTION, SOLUTION EPIDURAL; INFILTRATION; INTRACAUDAL PRN
Status: DISCONTINUED | OUTPATIENT
Start: 2023-01-12 | End: 2023-01-12 | Stop reason: ALTCHOICE

## 2023-01-12 ASSESSMENT — PAIN - FUNCTIONAL ASSESSMENT: PAIN_FUNCTIONAL_ASSESSMENT: 0-10

## 2023-01-12 ASSESSMENT — PAIN SCALES - GENERAL: PAINLEVEL_OUTOF10: 0

## 2023-01-12 NOTE — PRE SEDATION
[address] [address]  Pre-Sedation/Analgesia History & Physical    Pt Name: Ricci Hale  MRN: [id number]  YOB: 1980  Provider Performing Procedure: Mio Fernandez DO   Primary Care Physician: Naveenchidi       has a past medical history of Asthma, Depression, Irregular heart rate, and Migraines. SURGICAL HISTORY   has a past surgical history that includes  section; knee surgery; Tubal ligation; and Back Injection (Bilateral, 2022). ALLERGIES   Allergies as of 2022 - Fully Reviewed 2022   Allergen Reaction Noted    Methylprednisolone  2019       MEDICATIONS   Prior to Admission medications    Medication Sig Start Date End Date Taking? Authorizing Provider   pregabalin (LYRICA) 75 MG capsule Take 1 capsule by mouth 2 times daily for 30 days.  Max Daily Amount: 150 mg 22  ELIZABETH Lyman - CNP   venlafaxine (EFFEXOR XR) 150 MG extended release capsule Take 150 mg by mouth daily 22   Historical Provider, MD   venlafaxine (EFFEXOR XR) 75 MG extended release capsule Take 75 mg by mouth daily 22   Historical Provider, MD   busPIRone (BUSPAR) 5 MG tablet Take 5 mg by mouth daily 22   Historical Provider, MD   meloxicam (MOBIC) 15 MG tablet Take 15 mg by mouth daily  Patient not taking: Reported on 2023   Historical Provider, MD   omeprazole (PRILOSEC) 40 MG delayed release capsule Take 40 mg by mouth daily 22   Historical Provider, MD   AJOVY 225 MG/1.5ML SOSY  10/19/22   Historical Provider, MD   metoprolol succinate (TOPROL XL) 25 MG extended release tablet Take 1 tablet by mouth daily 3/10/22   Monty Quinones MD   albuterol (PROVENTIL) (2.5 MG/3ML) 0.083% nebulizer solution Take 2.5 mg by nebulization every 6 hours as needed for Wheezing    Historical Provider, MD   aspirin 81 MG EC tablet Take 1 tablet by mouth every 6 hours as needed for Pain 3/17/21   Monty Quinones MD   SUMAtriptan (IMITREX) 50 MG tablet Take 100 mg by mouth once    Historical Provider, MD   acetaminophen (TYLENOL) 500 MG tablet Take 500 mg by mouth every 6 hours as needed for Pain  Patient not taking: Reported on 1/12/2023    Historical Provider, MD   albuterol sulfate  (90 Base) MCG/ACT inhaler Inhale 2 puffs into the lungs every 6 hours as needed for Wheezing    Historical Provider, MD   ibuprofen (ADVIL;MOTRIN) 800 MG tablet Take 800 mg by mouth every 6 hours as needed for Pain    Historical Provider, MD     PHYSICAL:   Vitals:    01/12/23 0900   BP: 124/80   Pulse: 63   Resp: 14   Temp: 97.8 °F (36.6 °C)   SpO2: 97%     PLANNED PROCEDURE   See procedure note  SEDATION  Planned agent: Versed and Fentanyl  ASA Classification: 2  Class 1: A normal healthy patient  Class 2: Pt with mild to moderate systemic disease  Class 3: Severe systemic disease or disturbance  Class 4: Severe systemic disorders that are already life threatening. Class 5: Moribund pt with little chances of survival, for more than 24 hours. Mallampati I Airway Classification: 2    1. Pre-procedure diagnostic studies complete and results available. 2. Previous sedation/anesthesia experiences assessed. 3. The patient is an appropriate candidate to undergo the planned procedure sedation and anesthesia. (Refer to nursing sedation/analgesia documentation record)  4. Formulation and discussion of sedation/procedure plan, risks, and expectations with patient and/or responsible adult completed. 5. Patient examined immediately prior to the procedure.  (Refer to nursing sedation/analgesia documentation record)    Xochilt Chambers DO  Electronically signed 1/12/2023 at 10:52 AM

## 2023-01-12 NOTE — PROCEDURES
Pre-operative Diagnosis: Lumbar facet pain     Post-operative Diagnosis: Lumbar facet pain     Procedure: Bilateral lumbar medial branch blocks targeting facet joints of L4/L5 and L5/S1     Procedure Description:  After consent was obtained, the patient was placed in the prone position with a pillow under the abdomen to reduce the lordotic curve of the lumbar spine. The lower back was prepped with chloraprep and draped in a sterile fashion. Then, 0.5 cc of 1 % lidocaine was used for local anesthesia of the skin and superficial subcutaneous tissues. Three 22-gauge 3.5-inch spinal needles were advanced under fluoroscopy in an AP view: one at the sacral ala and two at the junction of the right superior articular process and the transverse process of the L4 and L5 vertebrae. There was no paresthesias, heme, or CSF obtained. Needle placement was confirmed using AP and oblique views. Then, 0.5 cc of 0.5% bupivacaine was injected at each site without complications. The procedure was repeated on the contralateral side. The patient tolerated the procedure well, transported to the recovery room, and discharged in ambulatory fashion.      Procedural Complications: None  Estimated Blood Loss: 0 mL      IV sedation was used during the procedure:  - Moderate intravenous conscious sedation was supervised by Dr. Darcy Ganser  - The patient was independently monitored by a Registered Nurse assigned to the procedure room  - Monitoring included automated blood pressure, continuous EKG, and continuous pulse oximetry  - The detailed conscious record is permanently stored in the [address] [id number]  - The following is the conscious sedation record:  Start Time: 08:50  End Time : 09:05  Duration: 15 minutes   Medications Administered: 3 mg Versed, 50 mcg Fentanyl     Cristian Meade DO  Interventional Pain Management/PM&R   [facility]

## 2023-01-12 NOTE — PROGRESS NOTES
0900-Patient to Phase II. Report received from surgical RN. Patient drowsy and reports complaints of dizziness. Vital signs obtained and stable. Respirations unlabored on room air. Patient denies pain and nausea. Denies numbness and tingling in extremities. Injection site open to air and no drainage noted. Patient instructed to stay in bed. Call light in reach. 0910-patient waking up more, eating drink and snack and tolerating well. Denies dizziness    0924-patient more awake at this time and state she is ready to go home. IV removed and getting dressed at the side of the bed    0930-patient taken out via wheelchair, tolerated well. Home with family.  Discharged in stable condition

## 2023-01-12 NOTE — POST SEDATION
[address] [address]  Sedation/Analgesia Post Sedation Record    Pt Name: Radha Tillman  MRN: [id number]  YOB: 1980  Procedure Performed By: Jhonatan Jimneez DO  Primary Care Physician: Kannan Klein    POST-PROCEDURE    Physicians/Assistants: Jhonatan Jimenez DO  Procedure Performed: See Procedure Note   Sedation/Anesthesia: Versed and Fentanyl (See procedure note for amount and duration)  Estimated Blood Loss:     0  ml  Specimens Removed: None        Complications: None           Cristian Gautam DO  Electronically signed 1/12/2023 at 10:52 AM

## 2023-02-01 ENCOUNTER — OFFICE VISIT (OUTPATIENT)
Dept: PHYSICAL MEDICINE AND REHAB | Age: 43
End: 2023-02-01
Payer: COMMERCIAL

## 2023-02-01 VITALS
HEIGHT: 62 IN | DIASTOLIC BLOOD PRESSURE: 84 MMHG | BODY MASS INDEX: 34.23 KG/M2 | SYSTOLIC BLOOD PRESSURE: 126 MMHG | WEIGHT: 186 LBS

## 2023-02-01 DIAGNOSIS — M47.819 FACET ARTHROPATHY: ICD-10-CM

## 2023-02-01 DIAGNOSIS — G95.0 SYRINX OF SPINAL CORD (HCC): ICD-10-CM

## 2023-02-01 DIAGNOSIS — M47.816 LUMBAR SPONDYLOSIS: Primary | ICD-10-CM

## 2023-02-01 DIAGNOSIS — M53.3 SI (SACROILIAC) JOINT DYSFUNCTION: ICD-10-CM

## 2023-02-01 DIAGNOSIS — G89.4 CHRONIC PAIN SYNDROME: ICD-10-CM

## 2023-02-01 PROCEDURE — G8417 CALC BMI ABV UP PARAM F/U: HCPCS | Performed by: NURSE PRACTITIONER

## 2023-02-01 PROCEDURE — G8427 DOCREV CUR MEDS BY ELIG CLIN: HCPCS | Performed by: NURSE PRACTITIONER

## 2023-02-01 PROCEDURE — 99214 OFFICE O/P EST MOD 30 MIN: CPT | Performed by: NURSE PRACTITIONER

## 2023-02-01 PROCEDURE — G8484 FLU IMMUNIZE NO ADMIN: HCPCS | Performed by: NURSE PRACTITIONER

## 2023-02-01 PROCEDURE — 1036F TOBACCO NON-USER: CPT | Performed by: NURSE PRACTITIONER

## 2023-02-01 RX ORDER — PREGABALIN 150 MG/1
150 CAPSULE ORAL 2 TIMES DAILY
Qty: 60 CAPSULE | Refills: 0 | Status: SHIPPED | OUTPATIENT
Start: 2023-02-01 | End: 2023-02-03

## 2023-02-02 ENCOUNTER — TELEPHONE (OUTPATIENT)
Dept: NEUROSURGERY | Age: 43
End: 2023-02-02

## 2023-02-02 ENCOUNTER — OFFICE VISIT (OUTPATIENT)
Dept: NEUROSURGERY | Age: 43
End: 2023-02-02
Payer: COMMERCIAL

## 2023-02-02 ENCOUNTER — TELEPHONE (OUTPATIENT)
Dept: CARDIOLOGY CLINIC | Age: 43
End: 2023-02-02

## 2023-02-02 ENCOUNTER — HOSPITAL ENCOUNTER (OUTPATIENT)
Dept: GENERAL RADIOLOGY | Age: 43
Discharge: HOME | End: 2023-02-02
Payer: COMMERCIAL

## 2023-02-02 ENCOUNTER — HOSPITAL ENCOUNTER (OUTPATIENT)
Age: 43
Discharge: HOME | End: 2023-02-02
Payer: COMMERCIAL

## 2023-02-02 VITALS
HEIGHT: 62 IN | BODY MASS INDEX: 34.23 KG/M2 | SYSTOLIC BLOOD PRESSURE: 110 MMHG | DIASTOLIC BLOOD PRESSURE: 70 MMHG | WEIGHT: 186 LBS

## 2023-02-02 DIAGNOSIS — M51.26 HNP (HERNIATED NUCLEUS PULPOSUS), LUMBAR: ICD-10-CM

## 2023-02-02 DIAGNOSIS — M54.16 LUMBAR RADICULOPATHY: Primary | ICD-10-CM

## 2023-02-02 DIAGNOSIS — G95.0 SYRINX (HCC): ICD-10-CM

## 2023-02-02 DIAGNOSIS — M54.16 LUMBAR RADICULOPATHY: ICD-10-CM

## 2023-02-02 DIAGNOSIS — R32 URINARY INCONTINENCE, UNSPECIFIED TYPE: ICD-10-CM

## 2023-02-02 DIAGNOSIS — Z01.818 PRE-OP TESTING: ICD-10-CM

## 2023-02-02 LAB
ANION GAP SERPL CALC-SCNC: 11 MEQ/L (ref 8–16)
APTT PPP: 27.8 SECONDS (ref 22–38)
BASOPHILS ABSOLUTE: 0.1 THOU/MM3 (ref 0–0.1)
BASOPHILS NFR BLD AUTO: 0.7 %
BUN SERPL-MCNC: 7 MG/DL (ref 7–22)
CALCIUM SERPL-MCNC: 9.5 MG/DL (ref 8.5–10.5)
CHLORIDE SERPL-SCNC: 101 MEQ/L (ref 98–111)
CO2 SERPL-SCNC: 28 MEQ/L (ref 23–33)
CREAT SERPL-MCNC: 0.7 MG/DL (ref 0.4–1.2)
DEPRECATED RDW RBC AUTO: 45.8 FL (ref 35–45)
EOSINOPHIL NFR BLD AUTO: 2.1 %
EOSINOPHILS ABSOLUTE: 0.2 THOU/MM3 (ref 0–0.4)
ERYTHROCYTE [DISTWIDTH] IN BLOOD BY AUTOMATED COUNT: 13.5 % (ref 11.5–14.5)
GFR SERPL CREATININE-BSD FRML MDRD: > 60 ML/MIN/1.73M2
GLUCOSE SERPL-MCNC: 101 MG/DL (ref 70–108)
HCT VFR BLD AUTO: 41.4 % (ref 37–47)
HGB BLD-MCNC: 13.9 GM/DL (ref 12–16)
IMM GRANULOCYTES # BLD AUTO: 0.03 THOU/MM3 (ref 0–0.07)
IMM GRANULOCYTES NFR BLD AUTO: 0.3 %
INR PPP: 1.02 (ref 0.85–1.13)
LYMPHOCYTES ABSOLUTE: 1.7 THOU/MM3 (ref 1–4.8)
LYMPHOCYTES NFR BLD AUTO: 19.4 %
MCH RBC QN AUTO: 31 PG (ref 26–33)
MCHC RBC AUTO-ENTMCNC: 33.6 GM/DL (ref 32.2–35.5)
MCV RBC AUTO: 92.2 FL (ref 81–99)
MONOCYTES ABSOLUTE: 0.5 THOU/MM3 (ref 0.4–1.3)
MONOCYTES NFR BLD AUTO: 5.9 %
MRSA DNA SPEC QL NAA+PROBE: NEGATIVE
NEUTROPHILS NFR BLD AUTO: 71.6 %
NRBC BLD AUTO-RTO: 0 /100 WBC
PLATELET # BLD AUTO: 303 THOU/MM3 (ref 130–400)
PMV BLD AUTO: 9.5 FL (ref 9.4–12.4)
POTASSIUM SERPL-SCNC: 3.5 MEQ/L (ref 3.5–5.2)
RBC # BLD AUTO: 4.49 MILL/MM3 (ref 4.2–5.4)
S AUREUS DNA SPEC QL NAA+PROBE: POSITIVE
SEGMENTED NEUTROPHILS ABSOLUTE COUNT: 6.3 THOU/MM3 (ref 1.8–7.7)
SODIUM SERPL-SCNC: 140 MEQ/L (ref 135–145)
WBC # BLD AUTO: 8.8 THOU/MM3 (ref 4.8–10.8)

## 2023-02-02 PROCEDURE — G8427 DOCREV CUR MEDS BY ELIG CLIN: HCPCS | Performed by: PHYSICIAN ASSISTANT

## 2023-02-02 PROCEDURE — 80048 BASIC METABOLIC PNL TOTAL CA: CPT

## 2023-02-02 PROCEDURE — 71046 X-RAY EXAM CHEST 2 VIEWS: CPT

## 2023-02-02 PROCEDURE — 99213 OFFICE O/P EST LOW 20 MIN: CPT | Performed by: PHYSICIAN ASSISTANT

## 2023-02-02 PROCEDURE — 87641 MR-STAPH DNA AMP PROBE: CPT

## 2023-02-02 PROCEDURE — 87640 STAPH A DNA AMP PROBE: CPT

## 2023-02-02 PROCEDURE — 85025 COMPLETE CBC W/AUTO DIFF WBC: CPT

## 2023-02-02 PROCEDURE — G8417 CALC BMI ABV UP PARAM F/U: HCPCS | Performed by: PHYSICIAN ASSISTANT

## 2023-02-02 PROCEDURE — 85610 PROTHROMBIN TIME: CPT

## 2023-02-02 PROCEDURE — 85730 THROMBOPLASTIN TIME PARTIAL: CPT

## 2023-02-02 PROCEDURE — 36415 COLL VENOUS BLD VENIPUNCTURE: CPT

## 2023-02-02 PROCEDURE — 1036F TOBACCO NON-USER: CPT | Performed by: PHYSICIAN ASSISTANT

## 2023-02-02 PROCEDURE — G8484 FLU IMMUNIZE NO ADMIN: HCPCS | Performed by: PHYSICIAN ASSISTANT

## 2023-02-02 NOTE — TELEPHONE ENCOUNTER
Pre op Risk Assessment    Procedure Lumbar discectomy of L5-S1  Physician 2-8-23  Date of surgery/procedure Dr. Morena Swann 9-14-22  Last Stress 4-14-21  Last Echo 6-19-19  Last Cath   Last Stent   Is patient on blood thinners ASA  Hold Meds/how many days ?

## 2023-02-02 NOTE — PROGRESS NOTES
[address] [place], [place] [address] [facility] [address]  Dept: [phone]  Dept Fax: [phone]  Loc: [phone]    Follow-up Visit  Visit Date: 2/2/2023      Chaitanya Edwards  is a 43 y.o. female who is returning to the office today for a follow-up visit to address radiating back pain secondary to lumbar disc herniation. Was last seen and evaluated in our office setting on 12/1/2022 and was consulted by Dr. Tomas Vaca with an MRI of the brain including CSF flow study to rule out Chiari malformation ordered along with new MRIs of the lumbar and thoracic spine also including CSF flow study CISS to rule out additional abnormalities including adhesions. She presents today having undergone MRI of the brain without contrast on 12/22/2022 which revealed no evidence for Chiari malformation with normal CSF flow along the ventral aspect of the lisa. Diminished CSF flow dorsal to the upper cervical spinal cord is noted. An MRI of the thoracic spine feels enlargement and the syrinx within the thoracic spinal cord between T7 and T10 now measuring 4.7 cm compared to 2.7 cm on prior imaging. A smaller syrinx between T3 and T6 remains unchanged along with a small syrinx in the conus unchanged. MRI of the lumbar spine is considered stable with no interval changes since the prior study with a syrinx in the distal spinal cord unchanged. A significant disc protrusion at L5-S1 results in moderate canal and bilateral foraminal stenosis with mild to moderate bilateral foraminal stenosis noted at the levels above presents a surgical concern. She has also been seen evaluated and treated by Dr. Chava Quintana where she underwent bilateral lumbar medial branch blocks targeting facet joints at L4-5 and L5-S1. She presents today accompanied by her  and is noticeably uncomfortable.   She relates ongoing low back pain with radiation to the knees bilaterally with occasional transient radiation to the feet. She also relates some transient urinary frequency and imbalance. (On exam patient exhibits positive straight leg raise on the left and on the right with weakness for dorsiflexion and plantarflexion noted at -4/5. ) exacerbated pain and weakness has worsened over the last few weeks. We reviewed the most recent image studies with a significant focus on the syrinx with future imaging requirements in the form of thoracic, cervical and lumbar MRIs to be imaged with and without contrast utilizing CISS sequencing to be performed in 2 months for review along with a referral to urology for dynamic urostudy to provide a baseline. With regard to the lumbar disc herniation we feel that more urgent surgical intervention is indicated and is reasonable in the form of lumbar discectomy of lumbar 5 sacral 1 with concurrent posterior lumbar decompression and instrumented fusion of lumbar 5 sacral 1 with possible interbody implants and possible extension to additional levels as indicated during the procedure. We have described that procedure in detail along with expectations for recovery and the associated risks which include, but are not limited to; bleeding, infection, anesthetic complication, neurologic injury, incomplete relief of symptoms, the need for future surgery and even death. Understanding the risk associated procedure she is able to moving forward with the consent offered for her signature and the surgery scheduling at [facility] to follow. In the interim she is encouraged to keep and maintain appointments with other subspecialties including pain management and to reach out to our service with any additional questions or concerns. Patient was evaluated today and is doing poorly overall. No new complaints were voiced. Patient  lives with their spouse  Wound: none  Follow-up Studies: No orders of the defined types were placed in this encounter. Assessment/Plan:  Status Post back pain/syrinx  Doing poorly overall  Encouraged gradual increase in physical and mental activity. Fall precaution and home safety education provided to patient. Follow-up: With regard to the lumbar disc herniation we feel that more urgent surgical intervention is indicated and is reasonable in the form of lumbar discectomy of lumbar 5 sacral 1 with concurrent posterior lumbar decompression and instrumented fusion of lumbar 5 sacral 1 with possible interbody implants and possible extension to additional levels as indicated during the procedure. We have described that procedure in detail along with expectations for recovery and the associated risks which include, but are not limited to; bleeding, infection, anesthetic complication, neurologic injury, incomplete relief of symptoms, the need for future surgery and even death. Understanding the risk associated procedure she is able to moving forward with the consent offered for her signature and the surgery scheduling at [facility] to follow. In the interim she is encouraged to keep and maintain appointments with other subspecialties including pain management and to reach out to our service with any additional questions or concerns.       Electronically signed by Rina Brown PA-C on 2/2/23 at 9:15 AM EST

## 2023-02-03 RX ORDER — AMOXICILLIN AND CLAVULANATE POTASSIUM 875; 125 MG/1; MG/1
1 TABLET, FILM COATED ORAL 2 TIMES DAILY
COMMUNITY
Start: 2023-01-24

## 2023-02-03 RX ORDER — RIZATRIPTAN BENZOATE 5 MG/1
1 TABLET ORAL DAILY PRN
COMMUNITY
Start: 2022-12-30

## 2023-02-03 NOTE — PROGRESS NOTES
PAT call attempted, patient unavailable, left message to please call us back at your earliest convenience; [phone]

## 2023-02-03 NOTE — PROGRESS NOTES
Follow all instructions given by your physician  NPO after midnight   Sips of water am of surgery with allowed medications  Bring insurance info and 's license  Wear comfortable clean, loose fitting clothing  No jewelry or contact lenses to be worn day of surgery   Case for glasses. Shower night before and morning of surgery with a liquid antibacterial soap, dry with fresh clean towel; no lotions, creams or powder. Clean sheets and pillow case on bed night before surgery  Bring medications in original bottles      Please refer to the SSI-Surgical Site Infection Flyer you hopefully received in the mail-together we can prevent infections; signs and symptoms reviewed. When discharged be sure you understand how to care for your wound and that you have the phone # to call if you have any concerns or questions about your wound.  needed at discharge and someone over 18 to stay with you for 24 hours overnight (surgery may be cancelled if you don't have this)  Report to [facility] on 2nd floor  If you would become ill prior to surgery, please call the surgeon  May have a visitor with you, we request that you limit to 2 visitors in pre-op area  Masks are recommended but not required, new masks at entrance desk  Call [phone] for any questions    Covid questionnaire Complete; Patient negative for symptoms or exposure. See documentation.

## 2023-02-07 ENCOUNTER — TELEPHONE (OUTPATIENT)
Dept: NEUROSURGERY | Age: 43
End: 2023-02-07

## 2023-02-07 NOTE — H&P
Kourtney Lala is a 43 y.o.  female, a never smoker tobacco a nonuser of smokeless tobacco or vaping products who denies current alcohol use and has a medical history significant for asthma, depression, irregular heart rate, and headaches (migraine) with a surgical history significant for injection therapies provided by pain management the left knee surgery. She presents with ongoing low back pain with radiation to the knees bilaterally with occasional transient radiation to the feet. She also relates occasional urinary frequency and imbalance. She treats with pain management where she has received bilateral lumbar and medial branch blocks targeting facet joints at L4-5 and L5-S1. On exam she presents with positive straight leg raise on the left and on the right with weakness for dorsiflexion and plantarflexion noted at -4/5. Pain and weakness is worsened over the following weeks an MRI of the lumbar spine imaged without contrast on 12/22/2022 disc extrusion at L5-S1 resulting in moderate canal and bilateral foraminal stenosis with mild canal and mild to moderate bilateral foraminal stenosis at L3-4 and L4-5. Past Medical History:   Diagnosis Date    Asthma     Depression     Irregular heart rate     Migraines        Allergies: Allergies   Allergen Reactions    Methylprednisolone Palpitations     jitters       Active Problems:    * No active hospital problems. *  Resolved Problems:    * No resolved hospital problems. *    Height 5' 2\" (1.575 m), weight 168 lb (76.2 kg), last menstrual period 01/27/2023. Review of Systems    Constitutional:  Positive for activity change. Respiratory:  Negative for apnea, chest tightness and shortness of breath. Cardiovascular:  Negative for chest pain and leg swelling. Gastrointestinal:  Positive for abdominal pain. Negative for abdominal distention. Musculoskeletal:  Positive for back pain. Neurological:  Positive for weakness.  Negative for dizziness, numbness and headaches. Psychiatric/Behavioral:  Negative for agitation, behavioral problems, confusion and decreased concentration. Physical Exam    Constitutional:       Appearance: Normal appearance. HENT:      Head: Normocephalic and atraumatic. Eyes:      Extraocular Movements: Extraocular movements intact. Pupils: Pupils are equal, round, and reactive to light. Cardiovascular:      Rate and Rhythm: Normal rate. Pulmonary:      Effort: Pulmonary effort is normal. No respiratory distress. Abdominal:      General: Abdomen is flat. Bowel sounds are normal. There is no distension. Tenderness: There is no abdominal tenderness. Musculoskeletal:      Comments: Limited range of motion secondary to pain. Skin:     General: Skin is warm and dry. Neurological:      General: No focal deficit present. Mental Status: She is alert and oriented to person, place, and time. Sensory: No sensory deficit. Motor: Weakness present. Comments: 5/5 strength for lower extremity groups bilaterally and symmetrically with some trace weakness noted in left greater than right distribution diffusely with dorsi and plantarflexion weakness at -4/5.  dorsiflexion along with extensor hallux longus are at -4/5. Is intact for pin prick sensation bilaterally and symmetrically. Positive straight leg raise elicited bilateral.   Psychiatric:         Mood and Affect: Mood normal.         Behavior: Behavior normal.         Thought Content:  Thought content normal.         Judgment: Judgment normal.     Assessment:  Lumbar herniated nucleus pulposus  Lumbar spinal stenosis        Plan:  Duration and severity of her symptoms along with findings on imaging on exam we find it is reasonable to offer surgical intervention in the form of lumbar discectomy of lumbar 5 sacral 1 with concurrent posterior lumbar decompression and instrumented fusion of lumbar 5 to sacral 1 with possible interbody implants and possible extension to additional levels as indicated during the procedure. We have described the procedure in detail along with expectations for recovery and the associated risk which include, but are not limited to; bleeding, infection, anesthetic complication, neurologic injury, incomplete relief of symptoms, the need for future surgery and even death.   Understanding the risk associated with the procedure she is amicable to moving forward and a consent has been offered for her signature with the surgery scheduled at [facility] with Dr. Erlinda Capellan on February 8, 2023    Alisia Hanson PA-C  2/7/2023

## 2023-02-07 NOTE — TELEPHONE ENCOUNTER
Called patient to inform her of the time change with her surgery that is scheduled with Dr. Karolina Avila on 2/8/23. Patient is to arrive at 6:00 am to [facility] surgery will begin at 8:00 am. Patient is to be NPO after midnight, stop ASA and blood thinner 5 days prior to surgery, take a shower with antibacterial soap the night before and the morning of surgery, bring insurance card, photo id and a list of medication the day of surgery. Patient will need a  to drive her home upon discharge from hospital. Patient verbalized understanding.

## 2023-02-08 ENCOUNTER — ANESTHESIA EVENT (OUTPATIENT)
Dept: OPERATING ROOM | Age: 43
End: 2023-02-08
Payer: COMMERCIAL

## 2023-02-08 ENCOUNTER — ANESTHESIA (OUTPATIENT)
Dept: OPERATING ROOM | Age: 43
End: 2023-02-08
Payer: COMMERCIAL

## 2023-02-08 ENCOUNTER — APPOINTMENT (OUTPATIENT)
Dept: GENERAL RADIOLOGY | Age: 43
End: 2023-02-08
Attending: NEUROLOGICAL SURGERY
Payer: COMMERCIAL

## 2023-02-08 ENCOUNTER — HOSPITAL ENCOUNTER (INPATIENT)
Age: 43
LOS: 3 days | Discharge: HOME | End: 2023-02-11
Attending: NEUROLOGICAL SURGERY | Admitting: NEUROLOGICAL SURGERY
Payer: COMMERCIAL

## 2023-02-08 DIAGNOSIS — Z41.9 ELECTIVE SURGERY: Primary | ICD-10-CM

## 2023-02-08 PROBLEM — M54.16 LUMBAR RADICULOPATHY: Status: ACTIVE | Noted: 2023-02-08

## 2023-02-08 LAB
ABO: NORMAL
ANION GAP SERPL CALC-SCNC: 10 MEQ/L (ref 8–16)
ANTIBODY SCREEN: NORMAL
BASOPHILS ABSOLUTE: 0 THOU/MM3 (ref 0–0.1)
BASOPHILS NFR BLD AUTO: 0.1 %
BUN SERPL-MCNC: 5 MG/DL (ref 7–22)
CALCIUM SERPL-MCNC: 7.5 MG/DL (ref 8.5–10.5)
CHLORIDE SERPL-SCNC: 109 MEQ/L (ref 98–111)
CO2 SERPL-SCNC: 24 MEQ/L (ref 23–33)
CREAT SERPL-MCNC: 0.8 MG/DL (ref 0.4–1.2)
DEPRECATED RDW RBC AUTO: 43.8 FL (ref 35–45)
EOSINOPHIL NFR BLD AUTO: 0 %
EOSINOPHILS ABSOLUTE: 0 THOU/MM3 (ref 0–0.4)
ERYTHROCYTE [DISTWIDTH] IN BLOOD BY AUTOMATED COUNT: 13.3 % (ref 11.5–14.5)
GFR SERPL CREATININE-BSD FRML MDRD: > 60 ML/MIN/1.73M2
GLUCOSE SERPL-MCNC: 141 MG/DL (ref 70–108)
HCT VFR BLD AUTO: 32.7 % (ref 37–47)
HGB BLD-MCNC: 11.2 GM/DL (ref 12–16)
IMM GRANULOCYTES # BLD AUTO: 0.07 THOU/MM3 (ref 0–0.07)
IMM GRANULOCYTES NFR BLD AUTO: 0.7 %
LYMPHOCYTES ABSOLUTE: 0.7 THOU/MM3 (ref 1–4.8)
LYMPHOCYTES NFR BLD AUTO: 6.5 %
MCH RBC QN AUTO: 31.2 PG (ref 26–33)
MCHC RBC AUTO-ENTMCNC: 34.3 GM/DL (ref 32.2–35.5)
MCV RBC AUTO: 91.1 FL (ref 81–99)
MONOCYTES ABSOLUTE: 0.3 THOU/MM3 (ref 0.4–1.3)
MONOCYTES NFR BLD AUTO: 2.9 %
NEUTROPHILS NFR BLD AUTO: 89.8 %
NRBC BLD AUTO-RTO: 0 /100 WBC
PLATELET # BLD AUTO: 204 THOU/MM3 (ref 130–400)
PMV BLD AUTO: 9.5 FL (ref 9.4–12.4)
POTASSIUM SERPL-SCNC: 3.6 MEQ/L (ref 3.5–5.2)
PREGNANCY, URINE: NEGATIVE
RBC # BLD AUTO: 3.59 MILL/MM3 (ref 4.2–5.4)
RH FACTOR: NORMAL
SEGMENTED NEUTROPHILS ABSOLUTE COUNT: 9.1 THOU/MM3 (ref 1.8–7.7)
SODIUM SERPL-SCNC: 143 MEQ/L (ref 135–145)
WBC # BLD AUTO: 10.1 THOU/MM3 (ref 4.8–10.8)

## 2023-02-08 PROCEDURE — 63052 LAM FACETC/FRMT ARTHRD LUM 1: CPT | Performed by: NEUROLOGICAL SURGERY

## 2023-02-08 PROCEDURE — 61783 SCAN PROC SPINAL: CPT | Performed by: NEUROLOGICAL SURGERY

## 2023-02-08 PROCEDURE — 86850 RBC ANTIBODY SCREEN: CPT

## 2023-02-08 PROCEDURE — 86901 BLOOD TYPING SEROLOGIC RH(D): CPT

## 2023-02-08 PROCEDURE — 86900 BLOOD TYPING SEROLOGIC ABO: CPT

## 2023-02-08 PROCEDURE — 2580000003 HC RX 258: Performed by: PHYSICIAN ASSISTANT

## 2023-02-08 PROCEDURE — 6360000002 HC RX W HCPCS: Performed by: PHYSICIAN ASSISTANT

## 2023-02-08 PROCEDURE — C1889 IMPLANT/INSERT DEVICE, NOC: HCPCS | Performed by: NEUROLOGICAL SURGERY

## 2023-02-08 PROCEDURE — 22853 INSJ BIOMECHANICAL DEVICE: CPT | Performed by: NEUROLOGICAL SURGERY

## 2023-02-08 PROCEDURE — 3600000014 HC SURGERY LEVEL 4 ADDTL 15MIN: Performed by: NEUROLOGICAL SURGERY

## 2023-02-08 PROCEDURE — C1713 ANCHOR/SCREW BN/BN,TIS/BN: HCPCS | Performed by: NEUROLOGICAL SURGERY

## 2023-02-08 PROCEDURE — 0SB20ZZ EXCISION OF LUMBAR VERTEBRAL DISC, OPEN APPROACH: ICD-10-PCS | Performed by: NEUROLOGICAL SURGERY

## 2023-02-08 PROCEDURE — 7100000001 HC PACU RECOVERY - ADDTL 15 MIN: Performed by: NEUROLOGICAL SURGERY

## 2023-02-08 PROCEDURE — 81025 URINE PREGNANCY TEST: CPT

## 2023-02-08 PROCEDURE — 6370000000 HC RX 637 (ALT 250 FOR IP): Performed by: PHYSICIAN ASSISTANT

## 2023-02-08 PROCEDURE — 0SG30AJ FUSION OF LUMBOSACRAL JOINT WITH INTERBODY FUSION DEVICE, POSTERIOR APPROACH, ANTERIOR COLUMN, OPEN APPROACH: ICD-10-PCS | Performed by: NEUROLOGICAL SURGERY

## 2023-02-08 PROCEDURE — 6360000002 HC RX W HCPCS: Performed by: NEUROLOGICAL SURGERY

## 2023-02-08 PROCEDURE — 3700000001 HC ADD 15 MINUTES (ANESTHESIA): Performed by: NEUROLOGICAL SURGERY

## 2023-02-08 PROCEDURE — 63052 LAM FACETC/FRMT ARTHRD LUM 1: CPT | Performed by: PHYSICIAN ASSISTANT

## 2023-02-08 PROCEDURE — 99254 IP/OBS CNSLTJ NEW/EST MOD 60: CPT

## 2023-02-08 PROCEDURE — 22630 ARTHRD PST TQ 1NTRSPC LUM: CPT | Performed by: NEUROLOGICAL SURGERY

## 2023-02-08 PROCEDURE — 3600000004 HC SURGERY LEVEL 4 BASE: Performed by: NEUROLOGICAL SURGERY

## 2023-02-08 PROCEDURE — 6360000002 HC RX W HCPCS

## 2023-02-08 PROCEDURE — 22853 INSJ BIOMECHANICAL DEVICE: CPT | Performed by: PHYSICIAN ASSISTANT

## 2023-02-08 PROCEDURE — 6370000000 HC RX 637 (ALT 250 FOR IP): Performed by: NEUROLOGICAL SURGERY

## 2023-02-08 PROCEDURE — 1200000000 HC SEMI PRIVATE

## 2023-02-08 PROCEDURE — 2580000003 HC RX 258

## 2023-02-08 PROCEDURE — 85025 COMPLETE CBC W/AUTO DIFF WBC: CPT

## 2023-02-08 PROCEDURE — 36415 COLL VENOUS BLD VENIPUNCTURE: CPT

## 2023-02-08 PROCEDURE — 22630 ARTHRD PST TQ 1NTRSPC LUM: CPT | Performed by: PHYSICIAN ASSISTANT

## 2023-02-08 PROCEDURE — 3700000000 HC ANESTHESIA ATTENDED CARE: Performed by: NEUROLOGICAL SURGERY

## 2023-02-08 PROCEDURE — 7100000000 HC PACU RECOVERY - FIRST 15 MIN: Performed by: NEUROLOGICAL SURGERY

## 2023-02-08 PROCEDURE — 2709999900 HC NON-CHARGEABLE SUPPLY: Performed by: NEUROLOGICAL SURGERY

## 2023-02-08 PROCEDURE — 3209999900 FLUORO FOR SURGICAL PROCEDURES

## 2023-02-08 PROCEDURE — 2720000010 HC SURG SUPPLY STERILE: Performed by: NEUROLOGICAL SURGERY

## 2023-02-08 PROCEDURE — 2500000003 HC RX 250 WO HCPCS

## 2023-02-08 PROCEDURE — 6360000002 HC RX W HCPCS: Performed by: STUDENT IN AN ORGANIZED HEALTH CARE EDUCATION/TRAINING PROGRAM

## 2023-02-08 PROCEDURE — 80048 BASIC METABOLIC PNL TOTAL CA: CPT

## 2023-02-08 PROCEDURE — 72100 X-RAY EXAM L-S SPINE 2/3 VWS: CPT

## 2023-02-08 DEVICE — TI ALLOY RAD ROD
Type: IMPLANTABLE DEVICE | Site: SPINE LUMBAR | Status: FUNCTIONAL
Brand: XIA 3

## 2023-02-08 DEVICE — POLYAXIAL SCREW
Type: IMPLANTABLE DEVICE | Site: SPINE LUMBAR | Status: FUNCTIONAL
Brand: XIA 3 SYSTEM - SERRATO

## 2023-02-08 DEVICE — BLOCKER
Type: IMPLANTABLE DEVICE | Site: SPINE LUMBAR | Status: FUNCTIONAL
Brand: XIA 3

## 2023-02-08 DEVICE — ALLOGRAFT BNE PTTY 100 MM 10 CC DBM VESUVIUS: Type: IMPLANTABLE DEVICE | Site: SPINE LUMBAR | Status: FUNCTIONAL

## 2023-02-08 DEVICE — IMPLANTABLE DEVICE: Type: IMPLANTABLE DEVICE | Site: SPINE LUMBAR | Status: FUNCTIONAL

## 2023-02-08 RX ORDER — PANTOPRAZOLE SODIUM 40 MG/1
40 TABLET, DELAYED RELEASE ORAL
Status: DISCONTINUED | OUTPATIENT
Start: 2023-02-09 | End: 2023-02-11 | Stop reason: HOSPADM

## 2023-02-08 RX ORDER — SUCCINYLCHOLINE/SOD CL,ISO/PF 200MG/10ML
SYRINGE (ML) INTRAVENOUS PRN
Status: DISCONTINUED | OUTPATIENT
Start: 2023-02-08 | End: 2023-02-08 | Stop reason: SDUPTHER

## 2023-02-08 RX ORDER — PHENYLEPHRINE HYDROCHLORIDE 10 MG/ML
INJECTION INTRAVENOUS PRN
Status: DISCONTINUED | OUTPATIENT
Start: 2023-02-08 | End: 2023-02-08 | Stop reason: SDUPTHER

## 2023-02-08 RX ORDER — ALBUTEROL SULFATE 2.5 MG/3ML
2.5 SOLUTION RESPIRATORY (INHALATION) EVERY 6 HOURS PRN
Status: DISCONTINUED | OUTPATIENT
Start: 2023-02-08 | End: 2023-02-11 | Stop reason: HOSPADM

## 2023-02-08 RX ORDER — GINSENG 100 MG
CAPSULE ORAL PRN
Status: DISCONTINUED | OUTPATIENT
Start: 2023-02-08 | End: 2023-02-08 | Stop reason: ALTCHOICE

## 2023-02-08 RX ORDER — HYDROCODONE BITARTRATE AND ACETAMINOPHEN 5; 325 MG/1; MG/1
1 TABLET ORAL EVERY 4 HOURS PRN
Status: DISCONTINUED | OUTPATIENT
Start: 2023-02-08 | End: 2023-02-11 | Stop reason: HOSPADM

## 2023-02-08 RX ORDER — GLYCOPYRROLATE 0.2 MG/ML
INJECTION INTRAMUSCULAR; INTRAVENOUS PRN
Status: DISCONTINUED | OUTPATIENT
Start: 2023-02-08 | End: 2023-02-08 | Stop reason: SDUPTHER

## 2023-02-08 RX ORDER — ROCURONIUM BROMIDE 10 MG/ML
INJECTION, SOLUTION INTRAVENOUS PRN
Status: DISCONTINUED | OUTPATIENT
Start: 2023-02-08 | End: 2023-02-08 | Stop reason: SDUPTHER

## 2023-02-08 RX ORDER — SODIUM CHLORIDE 0.9 % (FLUSH) 0.9 %
5-40 SYRINGE (ML) INJECTION EVERY 12 HOURS SCHEDULED
Status: DISCONTINUED | OUTPATIENT
Start: 2023-02-08 | End: 2023-02-11 | Stop reason: HOSPADM

## 2023-02-08 RX ORDER — LIDOCAINE HYDROCHLORIDE 20 MG/ML
INJECTION, SOLUTION INTRAVENOUS PRN
Status: DISCONTINUED | OUTPATIENT
Start: 2023-02-08 | End: 2023-02-08 | Stop reason: SDUPTHER

## 2023-02-08 RX ORDER — KETAMINE HCL IN NACL, ISO-OSM 100MG/10ML
SYRINGE (ML) INJECTION PRN
Status: DISCONTINUED | OUTPATIENT
Start: 2023-02-08 | End: 2023-02-08 | Stop reason: SDUPTHER

## 2023-02-08 RX ORDER — SODIUM CHLORIDE 9 MG/ML
INJECTION, SOLUTION INTRAVENOUS PRN
Status: DISCONTINUED | OUTPATIENT
Start: 2023-02-08 | End: 2023-02-11 | Stop reason: HOSPADM

## 2023-02-08 RX ORDER — CEFAZOLIN SODIUM 1 G/3ML
INJECTION, POWDER, FOR SOLUTION INTRAMUSCULAR; INTRAVENOUS PRN
Status: DISCONTINUED | OUTPATIENT
Start: 2023-02-08 | End: 2023-02-08 | Stop reason: SDUPTHER

## 2023-02-08 RX ORDER — MORPHINE SULFATE 2 MG/ML
2 INJECTION, SOLUTION INTRAMUSCULAR; INTRAVENOUS
Status: DISCONTINUED | OUTPATIENT
Start: 2023-02-08 | End: 2023-02-11 | Stop reason: HOSPADM

## 2023-02-08 RX ORDER — SODIUM CHLORIDE 9 MG/ML
INJECTION, SOLUTION INTRAVENOUS PRN
Status: DISCONTINUED | OUTPATIENT
Start: 2023-02-08 | End: 2023-02-08 | Stop reason: HOSPADM

## 2023-02-08 RX ORDER — SODIUM CHLORIDE 0.9 % (FLUSH) 0.9 %
5-40 SYRINGE (ML) INJECTION PRN
Status: DISCONTINUED | OUTPATIENT
Start: 2023-02-08 | End: 2023-02-08 | Stop reason: HOSPADM

## 2023-02-08 RX ORDER — VENLAFAXINE HYDROCHLORIDE 75 MG/1
75 CAPSULE, EXTENDED RELEASE ORAL DAILY
Status: DISCONTINUED | OUTPATIENT
Start: 2023-02-08 | End: 2023-02-11 | Stop reason: HOSPADM

## 2023-02-08 RX ORDER — HYDROMORPHONE HCL 110MG/55ML
PATIENT CONTROLLED ANALGESIA SYRINGE INTRAVENOUS PRN
Status: DISCONTINUED | OUTPATIENT
Start: 2023-02-08 | End: 2023-02-08 | Stop reason: SDUPTHER

## 2023-02-08 RX ORDER — SODIUM CHLORIDE 0.9 % (FLUSH) 0.9 %
5-40 SYRINGE (ML) INJECTION EVERY 12 HOURS SCHEDULED
Status: DISCONTINUED | OUTPATIENT
Start: 2023-02-08 | End: 2023-02-08 | Stop reason: HOSPADM

## 2023-02-08 RX ORDER — MEPERIDINE HYDROCHLORIDE 25 MG/ML
12.5 INJECTION INTRAMUSCULAR; INTRAVENOUS; SUBCUTANEOUS EVERY 5 MIN PRN
Status: DISCONTINUED | OUTPATIENT
Start: 2023-02-08 | End: 2023-02-08 | Stop reason: HOSPADM

## 2023-02-08 RX ORDER — SUMATRIPTAN 50 MG/1
50 TABLET, FILM COATED ORAL ONCE AS NEEDED
Status: DISCONTINUED | OUTPATIENT
Start: 2023-02-08 | End: 2023-02-11 | Stop reason: HOSPADM

## 2023-02-08 RX ORDER — AMOXICILLIN AND CLAVULANATE POTASSIUM 875; 125 MG/1; MG/1
1 TABLET, FILM COATED ORAL 2 TIMES DAILY
Status: DISCONTINUED | OUTPATIENT
Start: 2023-02-08 | End: 2023-02-09 | Stop reason: ALTCHOICE

## 2023-02-08 RX ORDER — TRANEXAMIC ACID 100 MG/ML
INJECTION, SOLUTION INTRAVENOUS PRN
Status: DISCONTINUED | OUTPATIENT
Start: 2023-02-08 | End: 2023-02-08 | Stop reason: SDUPTHER

## 2023-02-08 RX ORDER — PROPOFOL 10 MG/ML
INJECTION, EMULSION INTRAVENOUS CONTINUOUS PRN
Status: DISCONTINUED | OUTPATIENT
Start: 2023-02-08 | End: 2023-02-08 | Stop reason: SDUPTHER

## 2023-02-08 RX ORDER — SODIUM CHLORIDE 9 MG/ML
INJECTION, SOLUTION INTRAVENOUS CONTINUOUS PRN
Status: DISCONTINUED | OUTPATIENT
Start: 2023-02-08 | End: 2023-02-08 | Stop reason: SDUPTHER

## 2023-02-08 RX ORDER — METOPROLOL SUCCINATE 25 MG/1
25 TABLET, EXTENDED RELEASE ORAL DAILY
Status: DISCONTINUED | OUTPATIENT
Start: 2023-02-08 | End: 2023-02-11 | Stop reason: HOSPADM

## 2023-02-08 RX ORDER — FENTANYL CITRATE 50 UG/ML
50 INJECTION, SOLUTION INTRAMUSCULAR; INTRAVENOUS EVERY 5 MIN PRN
Status: DISCONTINUED | OUTPATIENT
Start: 2023-02-08 | End: 2023-02-08 | Stop reason: HOSPADM

## 2023-02-08 RX ORDER — SODIUM CHLORIDE 0.9 % (FLUSH) 0.9 %
5-40 SYRINGE (ML) INJECTION PRN
Status: DISCONTINUED | OUTPATIENT
Start: 2023-02-08 | End: 2023-02-11 | Stop reason: HOSPADM

## 2023-02-08 RX ORDER — MORPHINE SULFATE 4 MG/ML
4 INJECTION, SOLUTION INTRAMUSCULAR; INTRAVENOUS
Status: DISCONTINUED | OUTPATIENT
Start: 2023-02-08 | End: 2023-02-11 | Stop reason: HOSPADM

## 2023-02-08 RX ORDER — BUSPIRONE HYDROCHLORIDE 5 MG/1
5 TABLET ORAL DAILY
Status: DISCONTINUED | OUTPATIENT
Start: 2023-02-08 | End: 2023-02-11 | Stop reason: HOSPADM

## 2023-02-08 RX ORDER — SODIUM CHLORIDE 9 MG/ML
INJECTION, SOLUTION INTRAVENOUS CONTINUOUS
Status: ACTIVE | OUTPATIENT
Start: 2023-02-08 | End: 2023-02-09

## 2023-02-08 RX ORDER — SUMATRIPTAN 50 MG/1
100 TABLET, FILM COATED ORAL ONCE
Status: DISCONTINUED | OUTPATIENT
Start: 2023-02-08 | End: 2023-02-08 | Stop reason: ALTCHOICE

## 2023-02-08 RX ORDER — SODIUM CHLORIDE 9 MG/ML
INJECTION, SOLUTION INTRAVENOUS CONTINUOUS
Status: DISCONTINUED | OUTPATIENT
Start: 2023-02-08 | End: 2023-02-08

## 2023-02-08 RX ORDER — VANCOMYCIN HYDROCHLORIDE 1 G/20ML
INJECTION, POWDER, LYOPHILIZED, FOR SOLUTION INTRAVENOUS PRN
Status: DISCONTINUED | OUTPATIENT
Start: 2023-02-08 | End: 2023-02-08 | Stop reason: ALTCHOICE

## 2023-02-08 RX ORDER — VENLAFAXINE HYDROCHLORIDE 150 MG/1
150 CAPSULE, EXTENDED RELEASE ORAL DAILY
Status: DISCONTINUED | OUTPATIENT
Start: 2023-02-08 | End: 2023-02-11 | Stop reason: HOSPADM

## 2023-02-08 RX ORDER — ONDANSETRON 2 MG/ML
4 INJECTION INTRAMUSCULAR; INTRAVENOUS
Status: DISCONTINUED | OUTPATIENT
Start: 2023-02-08 | End: 2023-02-08 | Stop reason: HOSPADM

## 2023-02-08 RX ORDER — PROPOFOL 10 MG/ML
INJECTION, EMULSION INTRAVENOUS PRN
Status: DISCONTINUED | OUTPATIENT
Start: 2023-02-08 | End: 2023-02-08 | Stop reason: SDUPTHER

## 2023-02-08 RX ORDER — MIDAZOLAM HYDROCHLORIDE 1 MG/ML
INJECTION INTRAMUSCULAR; INTRAVENOUS PRN
Status: DISCONTINUED | OUTPATIENT
Start: 2023-02-08 | End: 2023-02-08 | Stop reason: SDUPTHER

## 2023-02-08 RX ORDER — DIPHENHYDRAMINE HYDROCHLORIDE 50 MG/ML
12.5 INJECTION INTRAMUSCULAR; INTRAVENOUS
Status: DISCONTINUED | OUTPATIENT
Start: 2023-02-08 | End: 2023-02-08 | Stop reason: HOSPADM

## 2023-02-08 RX ORDER — CYCLOBENZAPRINE HCL 10 MG
10 TABLET ORAL EVERY 12 HOURS PRN
Status: DISCONTINUED | OUTPATIENT
Start: 2023-02-08 | End: 2023-02-11 | Stop reason: HOSPADM

## 2023-02-08 RX ORDER — ALBUTEROL SULFATE 90 UG/1
2 AEROSOL, METERED RESPIRATORY (INHALATION) EVERY 6 HOURS PRN
Status: DISCONTINUED | OUTPATIENT
Start: 2023-02-08 | End: 2023-02-11 | Stop reason: HOSPADM

## 2023-02-08 RX ORDER — DEXAMETHASONE SODIUM PHOSPHATE 10 MG/ML
INJECTION, EMULSION INTRAMUSCULAR; INTRAVENOUS PRN
Status: DISCONTINUED | OUTPATIENT
Start: 2023-02-08 | End: 2023-02-08 | Stop reason: SDUPTHER

## 2023-02-08 RX ORDER — ESMOLOL HYDROCHLORIDE 10 MG/ML
INJECTION INTRAVENOUS PRN
Status: DISCONTINUED | OUTPATIENT
Start: 2023-02-08 | End: 2023-02-08 | Stop reason: SDUPTHER

## 2023-02-08 RX ORDER — ONDANSETRON 2 MG/ML
INJECTION INTRAMUSCULAR; INTRAVENOUS PRN
Status: DISCONTINUED | OUTPATIENT
Start: 2023-02-08 | End: 2023-02-08 | Stop reason: SDUPTHER

## 2023-02-08 RX ADMIN — AMOXICILLIN AND CLAVULANATE POTASSIUM 1 TABLET: 875; 125 TABLET, FILM COATED ORAL at 20:59

## 2023-02-08 RX ADMIN — CEFAZOLIN 2 G: 1 INJECTION, POWDER, FOR SOLUTION INTRAMUSCULAR; INTRAVENOUS at 12:10

## 2023-02-08 RX ADMIN — HYDROCODONE BITARTRATE AND ACETAMINOPHEN 1 TABLET: 5; 325 TABLET ORAL at 21:06

## 2023-02-08 RX ADMIN — Medication 200 MG: at 08:00

## 2023-02-08 RX ADMIN — HYDROMORPHONE HYDROCHLORIDE 1 MG: 2 INJECTION INTRAMUSCULAR; INTRAVENOUS; SUBCUTANEOUS at 09:47

## 2023-02-08 RX ADMIN — VENLAFAXINE HYDROCHLORIDE 75 MG: 75 CAPSULE, EXTENDED RELEASE ORAL at 15:48

## 2023-02-08 RX ADMIN — PHENYLEPHRINE HYDROCHLORIDE 100 MCG: 10 INJECTION INTRAVENOUS at 09:02

## 2023-02-08 RX ADMIN — Medication 2000 MG: at 15:57

## 2023-02-08 RX ADMIN — VENLAFAXINE HYDROCHLORIDE 150 MG: 150 CAPSULE, EXTENDED RELEASE ORAL at 15:47

## 2023-02-08 RX ADMIN — PROPOFOL 100 MG: 10 INJECTION, EMULSION INTRAVENOUS at 08:17

## 2023-02-08 RX ADMIN — ONDANSETRON 4 MG: 2 INJECTION INTRAMUSCULAR; INTRAVENOUS at 12:40

## 2023-02-08 RX ADMIN — DEXAMETHASONE SODIUM PHOSPHATE 10 MG: 10 INJECTION, EMULSION INTRAMUSCULAR; INTRAVENOUS at 08:00

## 2023-02-08 RX ADMIN — SODIUM CHLORIDE: 9 INJECTION, SOLUTION INTRAVENOUS at 11:31

## 2023-02-08 RX ADMIN — ROCURONIUM BROMIDE 20 MG: 50 INJECTION INTRAVENOUS at 09:41

## 2023-02-08 RX ADMIN — LIDOCAINE HYDROCHLORIDE 100 MG: 20 INJECTION INTRAVENOUS at 08:00

## 2023-02-08 RX ADMIN — Medication 20 MG: at 08:30

## 2023-02-08 RX ADMIN — MUPIROCIN: 20 OINTMENT TOPICAL at 15:48

## 2023-02-08 RX ADMIN — GLYCOPYRROLATE 0.2 MG: 0.2 INJECTION INTRAMUSCULAR; INTRAVENOUS at 08:00

## 2023-02-08 RX ADMIN — SODIUM CHLORIDE: 9 INJECTION, SOLUTION INTRAVENOUS at 08:07

## 2023-02-08 RX ADMIN — SODIUM CHLORIDE: 9 INJECTION, SOLUTION INTRAVENOUS at 15:53

## 2023-02-08 RX ADMIN — PROPOFOL 50 MG: 10 INJECTION, EMULSION INTRAVENOUS at 08:27

## 2023-02-08 RX ADMIN — MORPHINE SULFATE 4 MG: 4 INJECTION, SOLUTION INTRAMUSCULAR; INTRAVENOUS at 17:47

## 2023-02-08 RX ADMIN — HYDROMORPHONE HYDROCHLORIDE 1 MG: 2 INJECTION INTRAMUSCULAR; INTRAVENOUS; SUBCUTANEOUS at 08:42

## 2023-02-08 RX ADMIN — ESMOLOL HYDROCHLORIDE 50 MG: 100 INJECTION, SOLUTION INTRAVENOUS at 08:37

## 2023-02-08 RX ADMIN — PROPOFOL 100 MCG/KG/MIN: 10 INJECTION, EMULSION INTRAVENOUS at 08:09

## 2023-02-08 RX ADMIN — Medication 2000 MG: at 08:09

## 2023-02-08 RX ADMIN — ROCURONIUM BROMIDE 50 MG: 50 INJECTION INTRAVENOUS at 08:20

## 2023-02-08 RX ADMIN — TRANEXAMIC ACID 1000 MG: 100 INJECTION, SOLUTION INTRAVENOUS at 08:33

## 2023-02-08 RX ADMIN — SUGAMMADEX 200 MG: 100 INJECTION, SOLUTION INTRAVENOUS at 09:47

## 2023-02-08 RX ADMIN — SODIUM CHLORIDE: 9 INJECTION, SOLUTION INTRAVENOUS at 11:44

## 2023-02-08 RX ADMIN — Medication 30 MG: at 08:00

## 2023-02-08 RX ADMIN — MIDAZOLAM 2 MG: 1 INJECTION INTRAMUSCULAR; INTRAVENOUS at 07:48

## 2023-02-08 RX ADMIN — METOPROLOL SUCCINATE 25 MG: 25 TABLET, EXTENDED RELEASE ORAL at 15:47

## 2023-02-08 RX ADMIN — PROPOFOL 200 MG: 10 INJECTION, EMULSION INTRAVENOUS at 08:00

## 2023-02-08 RX ADMIN — HYDROCODONE BITARTRATE AND ACETAMINOPHEN 1 TABLET: 5; 325 TABLET ORAL at 15:45

## 2023-02-08 RX ADMIN — BUSPIRONE HYDROCHLORIDE 5 MG: 5 TABLET ORAL at 15:47

## 2023-02-08 RX ADMIN — SODIUM CHLORIDE: 9 INJECTION, SOLUTION INTRAVENOUS at 07:31

## 2023-02-08 ASSESSMENT — PAIN DESCRIPTION - DESCRIPTORS
DESCRIPTORS: STABBING
DESCRIPTORS: ACHING
DESCRIPTORS: ACHING;DISCOMFORT
DESCRIPTORS: ACHING;BURNING;STABBING
DESCRIPTORS: STABBING
DESCRIPTORS: ACHING;DISCOMFORT

## 2023-02-08 ASSESSMENT — PAIN SCALES - GENERAL
PAINLEVEL_OUTOF10: 8
PAINLEVEL_OUTOF10: 10
PAINLEVEL_OUTOF10: 7
PAINLEVEL_OUTOF10: 7
PAINLEVEL_OUTOF10: 5
PAINLEVEL_OUTOF10: 7

## 2023-02-08 ASSESSMENT — PAIN DESCRIPTION - LOCATION
LOCATION: BACK

## 2023-02-08 ASSESSMENT — PAIN DESCRIPTION - ORIENTATION
ORIENTATION: MID
ORIENTATION: MID;LOWER
ORIENTATION: MID
ORIENTATION: MID;LOWER
ORIENTATION: LOWER;MID

## 2023-02-08 ASSESSMENT — PAIN - FUNCTIONAL ASSESSMENT
PAIN_FUNCTIONAL_ASSESSMENT: PREVENTS OR INTERFERES SOME ACTIVE ACTIVITIES AND ADLS
PAIN_FUNCTIONAL_ASSESSMENT: ACTIVITIES ARE NOT PREVENTED
PAIN_FUNCTIONAL_ASSESSMENT: 0-10

## 2023-02-08 ASSESSMENT — PAIN DESCRIPTION - FREQUENCY
FREQUENCY: CONTINUOUS
FREQUENCY: CONTINUOUS

## 2023-02-08 ASSESSMENT — LIFESTYLE VARIABLES: SMOKING_STATUS: 0

## 2023-02-08 ASSESSMENT — PAIN DESCRIPTION - ONSET
ONSET: ON-GOING
ONSET: ON-GOING

## 2023-02-08 ASSESSMENT — PAIN DESCRIPTION - PAIN TYPE
TYPE: ACUTE PAIN;SURGICAL PAIN
TYPE: ACUTE PAIN;SURGICAL PAIN

## 2023-02-08 NOTE — LETTER
[facility]  [place]  Phone: [phone]    No name on file. February 11, 2023     Patient: Heide Espinoza   YOB: 1980   Date of Visit: 2/2/2023       To Whom It May Concern: It is my medical opinion that Estefania Colin {Work release (duty restriction):78895}. If you have any questions or concerns, please don't hesitate to call. Sincerely,        No name on file.

## 2023-02-08 NOTE — PROGRESS NOTES
Pt admitted to  7 via cart/stretcher. Complaints: Cam from PACU for back surgery. IV normal saline infusing into the hand left, condition patent and no redness at a rate of 100 mls/ hour with about 800 mls in the bag still. IV site free of s/s of infection or infiltration. Vital signs obtained. Assessment and data collection initiated. Two nurse skin assessment performed by Shawna Coronel and Laura Crawford RN. Oriented to room. Explained patients right to have family, representative or physician notified of their admission. Patient has Declined for physician to be notified. Patient has Declined for family/representative to be notified. The patient is interested in [facility]. Hospitals in [place] meds to beds program?:  No    Policies and procedures for  explained. All questions answered with no further questions at this time. Fall prevention and safety brochure discussed with patient. Bed alarm on. Call light in reach.

## 2023-02-08 NOTE — PROGRESS NOTES
Patient was seen and examined in the pre op area in conjunction with neurosurgery GUADALUPE MORIN. There are no changes in patient symptoms and neurological exam since the last time patient was seen in neurosurgery outpatient clinic. This is a 38-year old female with long history of a progressive severe low back pain started 2 years ago. Patient's back pain has been progressively getting worse over the last year. Patient's back pain increases with activity and standing and goes to both legs especially in the left (all the way down to her toes). Patient rated her pain as up to 10/10. Patient started to experience lower extremity weakness especially in the left over the last month. Patient denies any significant issues controlling her urination or bowel habits. Today neurological exam was significant for lower extremity weakness about 3+/5 especially in the left decidedly. Neurological exam was significant for antalgic gait, severe restriction in patient lumbar spine movement in all directions, rising leg signs is positive in the left. Patient underwent lumbar spine MRI that showed:  Large extrusion Herniated disc at the level of L5-S1. Bilateral facet disease at the level of L5-S1.    Bilateral foraminal stenosis  Stable syrinx in the distal thoracic spinal cord    Decision making:       - Based on patient's medical history, her symptoms the findings of her L-spine imaging studies, her body habitus and her response to several conservative treatment modalities, I would recommend a surgical intervention mainly in the form of\" posterior lumbar spine decompression, fusion and instrumentation from L5 to the S1, bilateral removal of herniated disc at that level from both side plus interbody cage placement at the level of L5-S1\"     - The above discussed the above recommended surgical intervention, as well as the associated risks and benefits were discussed in detail with patient and her  in front of neurosurgery PA Gordon Christian). - I discussed with the patient and her   the possible complication of surgery in detail including excessive blood loss requiring transfusion, infection that may become meningitis, problem with heart lung and kidney as a results of general anesthesia such as heart attack, pneumonia, kidney shutdown and stroke. I also discussed the possible complication of the operations in and around the spine such as, death, paralysis, weakness on one side or the other of the body, decreased sensation or pain on one side or the other of the body, inability to control bowel/bladder, problems with sexual function, postoperative meningitis, postoperative development of a blood clot that may require another operation, leakage of fluid from the wound that may require another operation. The unlikely event of blindness following surgery in the prone position was also discussed with the patient. The patient understands that no guarantee can be made regarding the extent of post-operative pain relief or the degree of improvement of pre op symptoms.  -I discussed to the patient that  she may need another spine surgical intervention in the future based on the development of her symptoms and the progression of his spine degenerative disease.  -I told the patient that he her spinal cord syrinx and she will need to continue follow-up clinically and radiology but at this time we will address her lumbar spine herniated disc versus I believe is responsible of most of her current patient's symptoms.  -All questions and concerns were addressed and answered. - Patient elected  to go with the recommended surgical intervention and he signed the surgical consent.  -Plan for surgery today.

## 2023-02-08 NOTE — PROGRESS NOTES
1306- Pt to pacu. Resp snoring, easy, unlabored. Pt non responsive. 1317- pt remains with snoring resp, non responsive to verbal stimuli. Pt appears in no acute distress. 1324- pt remains sleeping with snoring resp. Continues to be non responsive, continue to monitor. 1337- pt responds to voice by opening eyes, closing them almost immediately. Pt not following commands at this time. 1348- pt responding enough for assessment, complete at this time, see flow sheet. 1352- pt responds to voice responds appropriately stating no pain, nausea, just sleepy. Pt drifts back to sleep quickly. VSS. Pt appears in no acute distress. 1353- pt meets criteria for discharge from pacu, awaiting bed to finish cleaning on 7K    1400- report called to [address] [place] [address] room clean, transport requested. Notified sds to update family.     [facility] [address] arrives to take [facility] to [facility]

## 2023-02-08 NOTE — OP NOTE
[address] [address]  RECORD OF OPERATION    Patient name: [address] [place] [address] Record Number: [id number]  Account Number: [de-identified]      Date of Procedure: 2/8/2023    Pre-operative Diagnosis:     Large extrusion Herniated disc at the level of L5-S1. Lumbar spine stenosis at the level of L5-S1. Bilateral foraminal stenosis at the level of L5-S1  Bilateral facet disease at the level of L5-S1. Severe progressive back pain and bilateral legs pain. Bilateral lower extremities weakness. Post-operative Diagnosis: The same      PROCEDURE:     1- Posterior decompression from L5-S1.  2- bilateral  facetectomy at the levels of 5-S1  3-bilateral foraminotomy at the levels of L5-S1. 4-Microsurgical removal of the disc space at the level of L5-S1 from both sides  5-Placement of interbody cage at the level of L5-S1 from the left (Diana 8x 28 mm interbody cage)  6-  Posterior instrumentation and fusion from  L5 to the S1 as follow ( by utilizing [employer] lumbar spine instrumentation system):   Placement of 6.5 x 45 mm pedicle screw  at the level of L5 in both sides. Placement of 7.5 x 35  mm pedicle screw  at the level of S1 in both sides. Placement of 40  mm tommy in the left and 35 mm in the right. 7- Application of biological graft. 8- Using intraoperative microscope. 9- Using the intraoperative 3D Ziehm scan  10- Using intraoperative neuro-navigation   11- Using intraoperative neurophysiology monitoring. Anesthesia: General endotracheal     Surgeon: Suman Rojo MD   Assistant: Alecia Townsend PA-C     Estimated Blood Loss:500 ml     Drains: 1 TOBI drain     Blood Transfusions: One unit of blood      Complications: none immediately appreciated     Specimens:  None      Indications for Procedure:     - Robin is a 38-year old female with long history of a progressive severe low back pain started 2 years ago.   Patient's back pain has been progressively getting worse over the last year.  Patient's back pain increases with activity and standing and goes to both legs especially in the left (all the way down to her toes). Patient rated her pain as up to 10/10. Patient started to experience lower extremity weakness especially in the left over the last month. Patient denies any significant issues controlling her urination or bowel habits. Today neurological exam was significant for lower extremity weakness about 3+/5 especially in the left decidedly. Neurological exam was significant for antalgic gait, severe restriction in patient lumbar spine movement in all directions, rising leg signs is positive in the left. Patient underwent lumbar spine MRI that showed:  Large extrusion Herniated disc at the level of L5-S1. Bilateral facet disease at the level of L5-S1. Bilateral foraminal stenosis  Stable syrinx in the distal thoracic spinal cord       Decision making:         - Based on patient's medical history, her symptoms the findings of her L-spine imaging studies, her body habitus and her response to several conservative treatment modalities, I would recommend a surgical intervention mainly in the form of\" posterior lumbar spine decompression, fusion and instrumentation from L5 to the S1, bilateral removal of herniated disc at that level from both side plus interbody cage placement at the level of L5-S1\"     - The above discussed the above recommended surgical intervention, as well as the associated risks and benefits were discussed in detail with patient and her  in front of neurosurgery PA Luz Abdi). - I discussed with the patient and her   the possible complication of surgery in detail including excessive blood loss requiring transfusion, infection that may become meningitis, problem with heart lung and kidney as a results of general anesthesia such as heart attack, pneumonia, kidney shutdown and stroke.  I also discussed the possible complication of the operations in and around the spine such as, death, paralysis, weakness on one side or the other of the body, decreased sensation or pain on one side or the other of the body, inability to control bowel/bladder, problems with sexual function, postoperative meningitis, postoperative development of a blood clot that may require another operation, leakage of fluid from the wound that may require another operation. The unlikely event of blindness following surgery in the prone position was also discussed with the patient. The patient understands that no guarantee can be made regarding the extent of post-operative pain relief or the degree of improvement of pre op symptoms.  -I discussed to the patient that  she may need another spine surgical intervention in the future based on the development of her symptoms and the progression of his spine degenerative disease.  -I told the patient that he her spinal cord syrinx and she will need to continue follow-up clinically and radiology but at this time we will address her lumbar spine herniated disc versus I believe is responsible of most of her current patient's symptoms.  -All questions and concerns were addressed and answered. - Patient elected  to go with the recommended surgical intervention and he signed the surgical consent. PROCEDURE:         Patient was brought to the OR where she was placed supine initially, general anesthesia was inducted and IV line, A-lines,  Chandler catheter were inserted and maintained. Then, neurophysiology team  connected their neurophysiology electrodes according to their protocol. Next, patient turned in prone position on Lauri's table. Then, we marked the skin  Incision . Next, we prepped and draped the patient in a standard fashion. Next, we localized the levels of L5-S1 (the last lower 2 levels of the lumbar spine) by using the 3D Ziehm scan as intraoperative fluoroscope.  Then, we  proceeded with making the skin incision which was a midline skin incision. We dissected the muscles  in both sides all the way to the transverse process in both sides We exposed the transverse processes of L5- to the S1 from both sides. Next, We performed 3D Ziehm scan (in an 3D scan mode). We transferred the images to the neuro-navigation system. We did a registration between the patient and the neuro navigation system. As we satisfied with the accuracy of neuronavigational system. I  proceeded with the instrumentation stage, so I proceeded with inserting screws between  L4-S1  both sides utilizing the intraoperative neuro navigation system guidance as follow:      Placement of 6.5 x 45 mm pedicle screw  at the level of L5 in both sides. Placement of 7.5 x 35  mm pedicle screw  at the level of S1 in both sides. - As we satisfied with the location of the intrapedicle screws by obtaining another intraoperative 3D Ziehm scan and neuro physiology stimulation, I  proceeded with the decompression stage under the microscope vision. We performed a decompression as between L5-S1 as  follow:     1- Posterior decompression from L5-S1.  2- bilateral  facetectomy at the levels of 5-S1  3-bilateral foraminotomy at the levels of L5-S1. 4-Microsurgical removal of the disc space at the level of L5-S1 from tboth sides     By using a combination of high speed drill , osteotomes, rongeurs and kerrisons. After I satisfied with the degree of decompression that I achieved, I proceeded with placement of interbody cages at the level of L5-S1 from the left as follow:      Placement of interbody cage at the level of L5-S1 from the right (Diana 10 x 22 mm interbody cage). As we satisfied with the placement of the cages , I proceeded with placing rods on both sides  ( as highlighted above). - Next,  we proceeded with placing bone graft in both sides. Next, we proceeded with meticulous hemostasis. Next, we proceeded with copious irrigation.   Next, we closed the wound in standard fashion after placing a TOBI drain and vancomycin powder. The patient tolerated the surgery very well without intraoperative complications. At the end of surgery patient was flipped back on her bed. Then she was kept intubated (per anesthesia recommendation)  and she was transferred to the ICU for further observation and treatment. Bryson Black PA-C ( Neurosurgery PA) assisted throughout the procedure with positioning, draping, retraction, wound closure, and dressing application.      Tom Driver MD, MD  Electronically signed by me on 2/8/2023 at 12:32 PM

## 2023-02-08 NOTE — ANESTHESIA PROCEDURE NOTES
Arterial Line:    An arterial line was placed using surface landmarks, in the OR for the following indication(s): continuous blood pressure monitoring and blood sampling needed. A 20 gauge (size), 1 and 1/4 inch (length), Arrow (type) catheter was placed, into the right radial artery, secured by Tegaderm and tape. Anesthesia type: General    Events:  patient tolerated procedure well with no complications and EBL < 5mL. 2/8/2023 7:55 AM2/8/2023 8:00 AM  Resident/CRNA: ELIZABETH Min CRNA  Performed: Resident/CRNA   Preanesthetic Checklist  Completed: patient identified, IV checked, site marked, risks and benefits discussed, surgical/procedural consents, equipment checked, pre-op evaluation, timeout performed, anesthesia consent given, oxygen available, monitors applied/VS acknowledged, fire risk safety assessment completed and verbalized and blood product R/B/A discussed and consented

## 2023-02-08 NOTE — ANESTHESIA PRE PROCEDURE
Department of Anesthesiology  Preprocedure Note       Name:  Johanna Mitchell   Age:  43 y.o.  :  1980                                          MRN:  [id number]         Date:  2023      Surgeon: Tonya Lopez):  Tharon Schilder, MD    Procedure: Procedure(s):  LUMBAR DISCECTOMY OF L5-S1 WITH CONCURRENT POSTERIOR LUMBAR DECOMPRESSION AND INSTRUMENTED FUSION OF L5-S1 WITH POSS INTERBODY IMPLANTS    Medications prior to admission:   Prior to Admission medications    Medication Sig Start Date End Date Taking? Authorizing Provider   amoxicillin-clavulanate (AUGMENTIN) 875-125 MG per tablet Take 1 tablet by mouth in the morning and at bedtime 23   Historical Provider, MD   rizatriptan (MAXALT) 5 MG tablet Take 1 tablet by mouth daily as needed for Migraine 22   Historical Provider, MD   mupirocin (BACTROBAN) 2 % ointment Take by nasal route 2 times daily for 5 days prior to surgery.  23   Kushal Akhtar PA-C   venlafaxine (EFFEXOR XR) 150 MG extended release capsule Take 150 mg by mouth daily 22   Historical Provider, MD   venlafaxine (EFFEXOR XR) 75 MG extended release capsule Take 75 mg by mouth daily 22   Historical Provider, MD   busPIRone (BUSPAR) 5 MG tablet Take 5 mg by mouth daily 22   Historical Provider, MD   omeprazole (PRILOSEC) 40 MG delayed release capsule Take 40 mg by mouth daily 22   Historical Provider, MD   metoprolol succinate (TOPROL XL) 25 MG extended release tablet Take 1 tablet by mouth daily 3/10/22   Vandana Huitron MD   albuterol (PROVENTIL) (2.5 MG/3ML) 0.083% nebulizer solution Take 2.5 mg by nebulization every 6 hours as needed for Wheezing    Historical Provider, MD   aspirin 81 MG EC tablet Take 1 tablet by mouth every 6 hours as needed for Pain 3/17/21   Vandana Huitron MD   SUMAtriptan (IMITREX) 50 MG tablet Take 100 mg by mouth once    Historical Provider, MD   acetaminophen (TYLENOL) 500 MG tablet Take 500 mg by mouth every 6 hours as needed for Pain    Historical Provider, MD   albuterol sulfate  (90 Base) MCG/ACT inhaler Inhale 2 puffs into the lungs every 6 hours as needed for Wheezing    Historical Provider, MD   ibuprofen (ADVIL;MOTRIN) 800 MG tablet Take 800 mg by mouth every 6 hours as needed for Pain    Historical Provider, MD       Current medications:    No current facility-administered medications for this encounter. Allergies:     Allergies   Allergen Reactions    Methylprednisolone Palpitations     jitters       Problem List:    Patient Active Problem List   Diagnosis Code    Dizziness/ Vertigo R42    Intermittent palpitations R00.2    History of syncope Z87.898    Chest wall pain R07.89    BPPV (benign paroxysmal positional vertigo) H81.10       Past Medical History:        Diagnosis Date    Asthma     Depression     Irregular heart rate     Migraines        Past Surgical History:        Procedure Laterality Date    BACK INJECTION Bilateral 12/08/2022    bilateral sacroiliac joint injection performed by Wm Borden DO at [facility] [place] [facility]      times 4    KNEE SURGERY Left     times 2    PAIN MANAGEMENT PROCEDURE Bilateral 01/12/2023    Lumbar 4/5, 5/Sacral 1 medial branch blocks bilateral performed by Wm Borden DO at [address] [place] [address]         Social History:    Social History     Tobacco Use    Smoking status: Never    Smokeless tobacco: Never   Substance Use Topics    Alcohol use: Never     Comment: rarely                                Counseling given: Not Answered      Vital Signs (Current):   Vitals:    02/03/23 0950   Weight: 168 lb (76.2 kg)   Height: 5' 2\" (1.575 m)                                              BP Readings from Last 3 Encounters:   02/02/23 110/70   02/01/23 126/84   01/12/23 124/80       NPO Status:                                                                                 BMI:   Wt Readings from Last 3 Encounters: 02/03/23 168 lb (76.2 kg)   02/02/23 186 lb (84.4 kg)   02/01/23 186 lb (84.4 kg)     Body mass index is 30.73 kg/m². CBC:   Lab Results   Component Value Date/Time    WBC 8.8 02/02/2023 11:49 AM    RBC 4.49 02/02/2023 11:49 AM    HGB 13.9 02/02/2023 11:49 AM    HCT 41.4 02/02/2023 11:49 AM    MCV 92.2 02/02/2023 11:49 AM     02/02/2023 11:49 AM       CMP:   Lab Results   Component Value Date/Time     02/02/2023 11:49 AM    K 3.5 02/02/2023 11:49 AM     02/02/2023 11:49 AM    CO2 28 02/02/2023 11:49 AM    BUN 7 02/02/2023 11:49 AM    CREATININE 0.7 02/02/2023 11:49 AM    LABGLOM >60 02/02/2023 11:49 AM    GLUCOSE 101 02/02/2023 11:49 AM    CALCIUM 9.5 02/02/2023 11:49 AM       POC Tests: No results for input(s): POCGLU, POCNA, POCK, POCCL, POCBUN, POCHEMO, POCHCT in the last 72 hours.     Coags:   Lab Results   Component Value Date/Time    INR 1.02 02/02/2023 11:49 AM    APTT 27.8 02/02/2023 11:49 AM       HCG (If Applicable): No results found for: PREGTESTUR, PREGSERUM, HCG, HCGQUANT     ABGs: No results found for: PHART, PO2ART, KGJ4XUR, EYE6QBI, BEART, H3NYHXWR     Type & Screen (If Applicable):  No results found for: LABABO, LABRH    Drug/Infectious Status (If Applicable):  No results found for: HIV, HEPCAB    COVID-19 Screening (If Applicable): No results found for: COVID19        Anesthesia Evaluation   no history of anesthetic complications:   Airway: Mallampati: II          Dental:          Pulmonary:normal exam    (+) asthma:     (-) COPD and not a current smoker                           Cardiovascular:Negative CV ROS  Exercise tolerance: good (>4 METS),   (+) dysrhythmias:,     (-) hypertension, past MI and CAD                Neuro/Psych:   (+) headaches:, psychiatric history:depression/anxiety    (-) seizures and CVA           GI/Hepatic/Renal:   (+) GERD: well controlled,      (-) liver disease and no renal disease       Endo/Other:        (-) diabetes mellitus, hypothyroidism, hyperthyroidism               Abdominal:             Vascular:     - DVT and PE. Other Findings:           Anesthesia Plan      general     ASA 2     (PIV. Additional access can be obtained after induction if needed. Standard ASA monitors. IV/PO opioids and other adjuncts as needed for pain control. PACU post op for recovery. Possible anesthetics complications were discussed with the patient, including but not limited to: PONV, damage to the airway and surrounding structures (teeth, lips, gums, tongue, etc.), adverse reactions to medicine, cardiac complications (MI, CHF, arrhythmias, etc.), respiratory complications (post-op ventilation, respiratory failure, etc.), neurologic complications (nerve damage, stroke, seizure), and death. The patient was given the opportunity to ask questions and all questions were answered to the patient's satisfaction. The patient is in agreement with the anesthetic plan.  )  Induction: intravenous. arterial line    Anesthetic plan and risks discussed with patient and spouse. Plan discussed with CRNA.                     Ehsan Lezama DO   2/8/2023

## 2023-02-08 NOTE — LETTER
Kaz Grady  Phone: [phone]             February 11, 2023    Patient: Mariam Orr   YOB: 1980   Date of Visit: 2/8/2023       To Whom It May Concern:    Gabriela Srinivasan was seen and treated in our facility  beginning 2/8/2023 until 2/11/23. She  cannot return to work until stated by Deon Parks when seen in 2 weeks.       Sincerely,       Kyle Butts RN BSN        Signature:__________________________________

## 2023-02-08 NOTE — PROGRESS NOTES
ADMITTED TO [facility] AND ORIENTED TO UNIT. SCDS ON. FALL AND ALLERGY BANDS ON. PT VERBALIZED APPROVAL FOR FIRST NAME, LAST INITIAL AND PHYSICIAN NAME ON UNIT WHITEBOARD.

## 2023-02-08 NOTE — BRIEF OP NOTE
Brief Postoperative Note      Patient: Avril Garcia  YOB: 1980  MRN: [id number]    Date of Procedure: 2/8/2023    Pre-Op Diagnosis: Lumbar radiculopathy [M54.16]  Herniated nucleus pulposus of lumbosacral region [M51.27]    Post-Op Diagnosis: Same       Procedure(s):  LUMBAR DISCECTOMY OF L5-S1 WITH CONCURRENT POSTERIOR LUMBAR DECOMPRESSION AND INSTRUMENTED FUSION OF L5-S1 WITH INTERBODY IMPLANTS    Surgeon(s):  Dilma Alfaro MD    Assistant:  Physician Assistant: Ani Morfin PA-C    Anesthesia: General    Estimated Blood Loss (mL): 765    Complications: None    Specimens:   * No specimens in log *    Implants:  Implant Name Type Inv. Item Serial No.  Lot No. LRB No. Used Action   ALLOGRAFT BNE PTTY 100 MM 10 CC DBM VESUVIUS - [id number]  ALLOGRAFT BNE PTTY 100 MM 10 CC DBM VESUVIUS [id number] [employer] HOW-WD [id number] N/A 1 Implanted   CAGE SPNL INTBDY CONVX POR TI CASCADIA AN [id number] - [id number]  CAGE SPNL INTBDY CONVX POR TI CASCADIA AN [id number]  K2[employer] INC-WD [id number] N/A 1 Implanted   BLOCKER SPNL L50MM DIA6MM TI 1 LEV DACIA 3 - [id number]  BLOCKER SPNL L50MM DIA6MM TI 1 LEV DACIA 3  [employer] HOW-WD  N/A 4 Implanted   SCREW SPNL L45MM DIA6. 5MM POLYAX BLACK - [id number]  SCREW SPNL L45MM DIA6. 5MM POLYAX BLACK  H[employer]IN[employer] SPINE HOWM-WD  N/A 2 Implanted   SCREW SPNL L35MM DIA7. 5MM POLYAX BLACK - [id number]  SCREW SPNL L35MM DIA7. 5MM POLYAX BLACK  [employer]IN[employer] SPINE HOWM-WD  N/A 2 Implanted   NATHALY SPNL L35MM DIA6MM ANT CANC POST PEDCL TI RAD SMOOTH DACIA - [id number]  NATHALY SPNL L35MM DIA6MM ANT CANC POST PEDCL TI RAD SMOOTH DACIA  [employer] SPINE HOWM-WD  N/A 1 Implanted   NATHALY SPNL L40MM DIA6MM ANT CANC POST PEDCL TI RAD SMOOTH DACIA - [id number]  NATHALY SPNL L40MM DIA6MM ANT CANC POST PEDCL TI RAD SMOOTH DACIA  HIGINIO SPINE HOWM-WD  N/A 1 Implanted         Drains: Hemovac  Closed/Suction Drain Inferior;Right Back Accordion (Active)   Site Description Clean, dry & intact 02/08/23 1252   Dressing Status Clean, dry & intact; New dressing applied 02/08/23 1252   Drain Status Compressed 02/08/23 1252       Urinary Catheter 02/08/23 [place]-Temperature (Active)       Findings: Post lumbar 5 sacral 1 discectomy and concurrent posterior decompression and instrumented fusion of lumbar 5 sacral 1 with interbody implant    Electronically signed by Annette Tomas PA-C on 2/8/2023 at 1:01 PM

## 2023-02-09 PROBLEM — E87.6 HYPOKALEMIA: Status: ACTIVE | Noted: 2023-02-09

## 2023-02-09 LAB
ANION GAP SERPL CALC-SCNC: 10 MEQ/L (ref 8–16)
BASOPHILS ABSOLUTE: 0 THOU/MM3 (ref 0–0.1)
BASOPHILS NFR BLD AUTO: 0.3 %
BUN SERPL-MCNC: 6 MG/DL (ref 7–22)
CALCIUM SERPL-MCNC: 7.5 MG/DL (ref 8.5–10.5)
CHLORIDE SERPL-SCNC: 110 MEQ/L (ref 98–111)
CO2 SERPL-SCNC: 24 MEQ/L (ref 23–33)
CREAT SERPL-MCNC: 0.7 MG/DL (ref 0.4–1.2)
DEPRECATED RDW RBC AUTO: 47.1 FL (ref 35–45)
EOSINOPHIL NFR BLD AUTO: 0.2 %
EOSINOPHILS ABSOLUTE: 0 THOU/MM3 (ref 0–0.4)
ERYTHROCYTE [DISTWIDTH] IN BLOOD BY AUTOMATED COUNT: 13.8 % (ref 11.5–14.5)
GFR SERPL CREATININE-BSD FRML MDRD: > 60 ML/MIN/1.73M2
GLUCOSE SERPL-MCNC: 110 MG/DL (ref 70–108)
HCT VFR BLD AUTO: 32.5 % (ref 37–47)
HGB BLD-MCNC: 10.8 GM/DL (ref 12–16)
IMM GRANULOCYTES # BLD AUTO: 0.05 THOU/MM3 (ref 0–0.07)
IMM GRANULOCYTES NFR BLD AUTO: 0.5 %
LYMPHOCYTES ABSOLUTE: 1.4 THOU/MM3 (ref 1–4.8)
LYMPHOCYTES NFR BLD AUTO: 14.9 %
MAGNESIUM SERPL-MCNC: 2.1 MG/DL (ref 1.6–2.4)
MCH RBC QN AUTO: 31.4 PG (ref 26–33)
MCHC RBC AUTO-ENTMCNC: 33.2 GM/DL (ref 32.2–35.5)
MCV RBC AUTO: 94.5 FL (ref 81–99)
MONOCYTES ABSOLUTE: 0.7 THOU/MM3 (ref 0.4–1.3)
MONOCYTES NFR BLD AUTO: 7.3 %
NEUTROPHILS NFR BLD AUTO: 76.8 %
NRBC BLD AUTO-RTO: 0 /100 WBC
PLATELET # BLD AUTO: 202 THOU/MM3 (ref 130–400)
PMV BLD AUTO: 9.5 FL (ref 9.4–12.4)
POTASSIUM SERPL-SCNC: 3.4 MEQ/L (ref 3.5–5.2)
RBC # BLD AUTO: 3.44 MILL/MM3 (ref 4.2–5.4)
SEGMENTED NEUTROPHILS ABSOLUTE COUNT: 7.1 THOU/MM3 (ref 1.8–7.7)
SODIUM SERPL-SCNC: 144 MEQ/L (ref 135–145)
WBC # BLD AUTO: 9.3 THOU/MM3 (ref 4.8–10.8)

## 2023-02-09 PROCEDURE — 97530 THERAPEUTIC ACTIVITIES: CPT

## 2023-02-09 PROCEDURE — 6370000000 HC RX 637 (ALT 250 FOR IP): Performed by: PHYSICIAN ASSISTANT

## 2023-02-09 PROCEDURE — 2580000003 HC RX 258

## 2023-02-09 PROCEDURE — APPSS30 APP SPLIT SHARED TIME 16-30 MINUTES: Performed by: PHYSICIAN ASSISTANT

## 2023-02-09 PROCEDURE — 99232 SBSQ HOSP IP/OBS MODERATE 35: CPT | Performed by: PHYSICIAN ASSISTANT

## 2023-02-09 PROCEDURE — 97166 OT EVAL MOD COMPLEX 45 MIN: CPT

## 2023-02-09 PROCEDURE — 6360000002 HC RX W HCPCS: Performed by: PHYSICIAN ASSISTANT

## 2023-02-09 PROCEDURE — 85025 COMPLETE CBC W/AUTO DIFF WBC: CPT

## 2023-02-09 PROCEDURE — 80048 BASIC METABOLIC PNL TOTAL CA: CPT

## 2023-02-09 PROCEDURE — 1200000000 HC SEMI PRIVATE

## 2023-02-09 PROCEDURE — 2580000003 HC RX 258: Performed by: PHYSICIAN ASSISTANT

## 2023-02-09 PROCEDURE — 83735 ASSAY OF MAGNESIUM: CPT

## 2023-02-09 PROCEDURE — 36415 COLL VENOUS BLD VENIPUNCTURE: CPT

## 2023-02-09 PROCEDURE — 97162 PT EVAL MOD COMPLEX 30 MIN: CPT

## 2023-02-09 RX ORDER — POTASSIUM CHLORIDE 20 MEQ/1
40 TABLET, EXTENDED RELEASE ORAL PRN
Status: DISCONTINUED | OUTPATIENT
Start: 2023-02-09 | End: 2023-02-11 | Stop reason: HOSPADM

## 2023-02-09 RX ORDER — POTASSIUM CHLORIDE 7.45 MG/ML
10 INJECTION INTRAVENOUS PRN
Status: DISCONTINUED | OUTPATIENT
Start: 2023-02-09 | End: 2023-02-11 | Stop reason: HOSPADM

## 2023-02-09 RX ADMIN — Medication 2000 MG: at 23:47

## 2023-02-09 RX ADMIN — BUSPIRONE HYDROCHLORIDE 5 MG: 5 TABLET ORAL at 10:48

## 2023-02-09 RX ADMIN — HYDROCODONE BITARTRATE AND ACETAMINOPHEN 1 TABLET: 5; 325 TABLET ORAL at 06:22

## 2023-02-09 RX ADMIN — VENLAFAXINE HYDROCHLORIDE 150 MG: 150 CAPSULE, EXTENDED RELEASE ORAL at 10:51

## 2023-02-09 RX ADMIN — SODIUM CHLORIDE: 9 INJECTION, SOLUTION INTRAVENOUS at 01:53

## 2023-02-09 RX ADMIN — Medication 2000 MG: at 00:34

## 2023-02-09 RX ADMIN — SODIUM CHLORIDE: 9 INJECTION, SOLUTION INTRAVENOUS at 11:01

## 2023-02-09 RX ADMIN — HYDROCODONE BITARTRATE AND ACETAMINOPHEN 1 TABLET: 5; 325 TABLET ORAL at 21:00

## 2023-02-09 RX ADMIN — POTASSIUM CHLORIDE 40 MEQ: 1500 TABLET, EXTENDED RELEASE ORAL at 10:48

## 2023-02-09 RX ADMIN — HYDROCODONE BITARTRATE AND ACETAMINOPHEN 1 TABLET: 5; 325 TABLET ORAL at 16:41

## 2023-02-09 RX ADMIN — Medication 2000 MG: at 11:08

## 2023-02-09 RX ADMIN — MUPIROCIN: 20 OINTMENT TOPICAL at 10:47

## 2023-02-09 RX ADMIN — HYDROCODONE BITARTRATE AND ACETAMINOPHEN 1 TABLET: 5; 325 TABLET ORAL at 10:48

## 2023-02-09 RX ADMIN — CYCLOBENZAPRINE 10 MG: 10 TABLET, FILM COATED ORAL at 13:33

## 2023-02-09 RX ADMIN — METOPROLOL SUCCINATE 25 MG: 25 TABLET, EXTENDED RELEASE ORAL at 10:48

## 2023-02-09 RX ADMIN — VENLAFAXINE HYDROCHLORIDE 75 MG: 75 CAPSULE, EXTENDED RELEASE ORAL at 10:48

## 2023-02-09 RX ADMIN — AMOXICILLIN AND CLAVULANATE POTASSIUM 1 TABLET: 875; 125 TABLET, FILM COATED ORAL at 10:46

## 2023-02-09 RX ADMIN — Medication 2000 MG: at 16:44

## 2023-02-09 RX ADMIN — PANTOPRAZOLE SODIUM 40 MG: 40 TABLET, DELAYED RELEASE ORAL at 06:12

## 2023-02-09 ASSESSMENT — PAIN DESCRIPTION - ORIENTATION
ORIENTATION: MID;LOWER
ORIENTATION: MID
ORIENTATION: MID;LOWER
ORIENTATION: MID;LOWER

## 2023-02-09 ASSESSMENT — PAIN DESCRIPTION - FREQUENCY: FREQUENCY: CONTINUOUS

## 2023-02-09 ASSESSMENT — PAIN DESCRIPTION - DESCRIPTORS
DESCRIPTORS: ACHING;DISCOMFORT;THROBBING;TENDER
DESCRIPTORS: ACHING;SHOOTING
DESCRIPTORS: ACHING;DISCOMFORT;SHOOTING;STABBING
DESCRIPTORS: ACHING;DISCOMFORT

## 2023-02-09 ASSESSMENT — PAIN SCALES - GENERAL
PAINLEVEL_OUTOF10: 10
PAINLEVEL_OUTOF10: 6
PAINLEVEL_OUTOF10: 9

## 2023-02-09 ASSESSMENT — PAIN DESCRIPTION - ONSET: ONSET: ON-GOING

## 2023-02-09 ASSESSMENT — PAIN DESCRIPTION - LOCATION
LOCATION: BACK

## 2023-02-09 ASSESSMENT — PAIN DESCRIPTION - PAIN TYPE: TYPE: ACUTE PAIN;SURGICAL PAIN

## 2023-02-09 ASSESSMENT — PAIN - FUNCTIONAL ASSESSMENT: PAIN_FUNCTIONAL_ASSESSMENT: ACTIVITIES ARE NOT PREVENTED

## 2023-02-09 NOTE — CONSULTS
Hospitalist Consult Note        Patient:  Moira Fragoso  YOB: 1980  Date of Service: 2/8/2023  MRN: [id number]   Acct:  [de-identified]   Primary Care Physician: Tre Acharya    Chief Complaint:  low back pain  Reason for consult  post-operative medical management    Date of Service: Pt seen/examined in consultation on 2/8/2023     History Of Present Illness:    Lord [age] y.o. female who we are asked to see/evaluate by Owen Larios MD for post-operative medical management. Pt has a past medical history of depression, migraines, palpitations, cesarian section who presented today for a scheduled procedure with Dr. Javier Alexander, neurosurgery due to poorly controlled conservative treatment for her back pain. Pt undwerwent surgery without complication today, per brief op note. She denies chest pain, shortness of breath, abdominal pain at this time. She reports no pain at rest, significant pain with activity and movement since surgery. Pt expressed no complaints at this time. Assessment and Plan:-  Lumbar discectomy of L5-S1 with posterior lumbar decompression and instrumentation fusion of L5-S1:    Procedure 2/8 with Dr. Javier Alexander- managed by primary. EBL 500cc. Pt reports pain is well controlled at this time. Tolerated dinner meal.     Acute normocytic anemia:  Hgb 11.2 today. Suspect secondary #1. Repeat CBC in the AM.      Hyperglycemia:    Suspect acute reaction from surgical intervention. No history of diabetes. Repeat BMP in the AM.     Hx of palpitations: Follows with Dr. Ben Medrano with Cardiology. Continue home metoprolol. HR remains well controlled. Hx of Depression:    Stable. Continue home meds. Hx of migraines:  Home Imatrex PRN ordered.         Past Medical History:        Diagnosis Date    Asthma     Depression     Irregular heart rate     Migraines        Past Surgical History:        Procedure Laterality Date    BACK INJECTION Bilateral 12/08/2022    bilateral sacroiliac joint injection performed by Amy Aguero DO at [address]      times 4    KNEE SURGERY Left     times 2    PAIN MANAGEMENT PROCEDURE Bilateral 01/12/2023    Lumbar 4/5, 5/Sacral 1 medial branch blocks bilateral performed by Amy Aguero DO at [address] Medications:   No current facility-administered medications on file prior to encounter. Current Outpatient Medications on File Prior to Encounter   Medication Sig Dispense Refill    amoxicillin-clavulanate (AUGMENTIN) 875-125 MG per tablet Take 1 tablet by mouth in the morning and at bedtime      rizatriptan (MAXALT) 5 MG tablet Take 1 tablet by mouth daily as needed for Migraine      venlafaxine (EFFEXOR XR) 150 MG extended release capsule Take 150 mg by mouth daily      venlafaxine (EFFEXOR XR) 75 MG extended release capsule Take 75 mg by mouth daily      busPIRone (BUSPAR) 5 MG tablet Take 5 mg by mouth daily      omeprazole (PRILOSEC) 40 MG delayed release capsule Take 40 mg by mouth daily      metoprolol succinate (TOPROL XL) 25 MG extended release tablet Take 1 tablet by mouth daily 90 tablet 3    albuterol (PROVENTIL) (2.5 MG/3ML) 0.083% nebulizer solution Take 2.5 mg by nebulization every 6 hours as needed for Wheezing      aspirin 81 MG EC tablet Take 1 tablet by mouth every 6 hours as needed for Pain 30 tablet     SUMAtriptan (IMITREX) 50 MG tablet Take 100 mg by mouth once      albuterol sulfate  (90 Base) MCG/ACT inhaler Inhale 2 puffs into the lungs every 6 hours as needed for Wheezing      ibuprofen (ADVIL;MOTRIN) 800 MG tablet Take 800 mg by mouth every 6 hours as needed for Pain         Allergies:    Methylprednisolone    Social History:    reports that she has never smoked. She has never used smokeless tobacco. She reports that she does not drink alcohol and does not use drugs.     Family History:       Problem Relation Age of Onset    No Known Problems Mother No Known Problems Father        Diet:  ADULT DIET; Regular    Review of systems:   Pertinent positives as noted in the HPI. All other systems reviewed and negative. PHYSICAL EXAM:  /70   Pulse 74   Temp 98 °F (36.7 °C) (Oral)   Resp 16   Ht 5' 2\" (1.575 m)   Wt 180 lb 6.4 oz (81.8 kg)   LMP 01/27/2023   SpO2 99%   BMI 33.00 kg/m²   General appearance: No apparent distress, appears stated age and cooperative. HEENT: Normal cephalic, atraumatic without obvious deformity. Pupils equal, round, and reactive to light. Extra ocular muscles intact. Conjunctivae/corneas clear. Neck: Supple, with full range of motion. Trachea midline. Respiratory:  Normal respiratory effort. Clear to auscultation, bilaterally without Rales/Wheezes/Rhonchi. Cardiovascular: Regular rate and rhythm with normal S1/S2 without murmurs, rubs or gallops. Abdomen: Soft, non-tender, non-distended with normal bowel sounds. Musculoskeletal:  No clubbing, cyanosis or edema bilaterally. Skin: Skin color, texture, turgor normal.  No rashes or lesions. Neurologic:  Neurovascularly intact without any focal sensory/motor deficits. Cranial nerves: II-XII intact, grossly non-focal.  Psychiatric: Alert and oriented, thought content appropriate, normal insight  Peripheral Pulses: +2 palpable, equal bilaterally     Labs:   No results for input(s): WBC, HGB, HCT, PLT in the last 72 hours. No results for input(s): NA, K, CL, CO2, BUN, CREATININE, CALCIUM, PHOS in the last 72 hours. Invalid input(s): MAGNES  No results for input(s): AST, ALT, BILIDIR, BILITOT, ALKPHOS in the last 72 hours. No results for input(s): INR in the last 72 hours. No results for input(s): Katerina Humacao in the last 72 hours.     Urinalysis:    No results found for: Hernán Krause, BACTERIA, RBCUA, BLOODU, SPECGRAV, [address] [place] [address]    Radiology:   XR LUMBAR SPINE (2-3 VIEWS)   Final Result   FINDINGS/IMPRESSION:   There are laminectomies at L5 and S1 with interbody fusion/spacer device at L5-S1 and bilateral pedicle screws at L5 and S1 with interconnecting rods. The right L5 pedicle screw appears to be medialized. There is no evidence of hardware failure. Please    refer to the operative report for further information. **This report has been created using voice recognition software. It may contain minor errors which are inherent in voice recognition technology. **      Final report electronically signed by Dr. Baljinder Alvarez MD on 2/8/2023 2:54 PM      FLUORO FOR SURGICAL PROCEDURES   Final Result        XR LUMBAR SPINE (2-3 VIEWS)    Result Date: 2/8/2023  PROCEDURE: XR LUMBAR SPINE (2-3 VIEWS) CLINICAL INFORMATION: surgery, COMPARISON: MRI lumbar spine dated 12/22/2022. TECHNIQUE: AP and lateral views of the lumbosacral junction     FINDINGS/IMPRESSION: There are laminectomies at L5 and S1 with interbody fusion/spacer device at L5-S1 and bilateral pedicle screws at L5 and S1 with interconnecting rods. The right L5 pedicle screw appears to be medialized. There is no evidence of hardware failure. Please refer to the operative report for further information. **This report has been created using voice recognition software. It may contain minor errors which are inherent in voice recognition technology. ** Final report electronically signed by Dr. Baljinder Alvarez MD on 2/8/2023 2:54 PM    FLUORO FOR SURGICAL PROCEDURES    Result Date: 2/8/2023  Radiology exam is complete. No Radiologist dictation. Please follow up with ordering provider.          Skinny [address]    Electronically signed by Zina Molina PA-C on 2/8/2023 at 7:58 PM

## 2023-02-09 NOTE — CARE COORDINATION
Case Management Assessment  Initial Evaluation    Date/Time of Evaluation: 2/9/2023 12:11 PM  Assessment Completed by: Sophy Morales RN    If patient is discharged prior to next notation, then this note serves as note for discharge by case management. Patient Name: Avril Garcia                   YOB: 1980  Diagnosis: Lumbar radiculopathy [M54.16]  Herniated nucleus pulposus of lumbosacral region [M51.27]                   Date / Time: 2/8/2023  6:04 AM  Location: [address] [place] [address]     Patient Admission Status: Inpatient   Readmission Risk Low 0-14, Mod 15-19), High > 20: Readmission Risk Score: 11.6    Current PCP: Danuta Barger  PCP verified by CM? Yes    Chart Reviewed: Yes      History Provided by: Patient  Patient Orientation: Alert and Oriented    Patient Cognition: Alert    Hospitalization in the last 30 days (Readmission):  No    If yes, Readmission Assessment in CM Navigator will be completed. Advance Directives:      Code Status: Prior   Patient's Primary Decision Maker is: Legal Next of Kin   Ernesto       Discharge Planning:    Patient lives with: Spouse/Significant Other   Type of Home: Apartment  Primary Care Giver: Self  Patient Support Systems include: Spouse/Significant Other, Children   Current Financial resources: Medicaid  Current community resources: None  Current services prior to admission: None  Current DME:   LS brace to be ordered, walker, crutches  Type of Home Care services:  None    ADLS  Prior functional level: Independent in ADLs/IADLs  Current functional level: Independent in ADLs/IADLs    Family can provide assistance at DC: Yes  Would you like Case Management to discuss the discharge plan with any other family members/significant others, and if so, who?  No  Plans to Return to Present Housing: Yes  Other Identified Issues/Barriers to RETURNING to current housing: none  Potential Assistance needed at discharge: N/A  Potential DME:  na  Patient expects to discharge to: [address] [place] [address] for transportation at discharge: Family    Financial    Payor: [address] [facility]  [address] / Plan: [address] [facility]  [address] / Product Type: *No Product type* /     Does insurance require precert for SNF: Yes    Potential assistance Purchasing Medications: No  Meds-to-Beds request: Yes      [address] [phone] - F [phone]  [facility] [address] [place] [address]  Phone: [phone] Fax: [phone]      Notes:    Factors facilitating achievement of predicted outcomes: Family support, Motivated, Cooperative, Good insight into deficits, and Has needed Durable Medical Equipment at home    Barriers to discharge: Pain, Impulsivity, and Decreased endurance    Additional Case Management Notes: planned surgery    Procedure: 2/8 LUMBAR DISCECTOMY OF L5-S1 WITH CONCURRENT POSTERIOR LUMBAR DECOMPRESSION AND INSTRUMENTED FUSION OF L5-S1 WITH INTERBODY IMPLANTS    N/V checks, seizure precautions, needs LS back brace, PT and OT evals. Pain control. Follow drain output (130 mL). Dressing care. The Plan for Transition of Care is related to the following treatment goals of Lumbar radiculopathy [M54.16]  Herniated nucleus pulposus of lumbosacral region [M51.27]    Patient Goals/Plan/Treatment Preferences: Priscila Rowan and her  live in [facility] in one level apartment. She has back brace, crutches, std walker. She has PCP, insured, and can afford medicines. She declined HH; plans drain out Friday per Melonie BUTCHER and home thereafter. Transportation/Food Security/Housekeeping Addressed: No issues identified.      Rosana Quintana RN  Case Management Department

## 2023-02-09 NOTE — PROGRESS NOTES
[address] [address]  INPATIENT PHYSICAL THERAPY  EVALUATION  [facility] ORTHOPEDICS 7K - 7K-05/005-A    Time In: 2564  Time Out: 8395  Timed Code Treatment Minutes: 24 Minutes  Minutes: 33          Date: 2023  Patient Name: Barrett Pham,  Gender:  female        MRN: [id number]  : 1980  (43 y.o.)      Referring Practitioner: Darcie Segura PA-C  Diagnosis: Lumbar radiculopathy  Additional Pertinent Hx: Pt is s/p LUMBAR DISCECTOMY OF L5-S1 WITH CONCURRENT POSTERIOR LUMBAR DECOMPRESSION AND INSTRUMENTED FUSION OF L5-S1 WITH INTERBODY IMPLANTS completed by Dr. Montana Jackson on . Restrictions/Precautions:  Restrictions/Precautions: General Precautions, Fall Risk, Surgical Protocols  Required Braces or Orthoses  Spinal: Lumbar Corset  Spinal Other: Per Alejandra Wiley via perfect serve, pt ok for abdominal binder until brace arrives  Position Activity Restriction  Spinal Precautions: No Bending, No Lifting, No Twisting  Other position/activity restrictions: hemovac drain    Subjective:  Chart Reviewed: Yes  Patient assessed for rehabilitation services?: Yes  Family / Caregiver Present: Yes (significant other)  Subjective: OK to see pt per nursing. Pt in bed when PT arrived, agreeable to PT session with some hesitation. Pt agreeable to sit in chair as able.     General:  Overall Orientation Status: Within Normal Limits  Orientation Level: Oriented X4  Vision: Within Functional Limits  Hearing: Within functional limits       Pain: 8/10: pt due for pain meds and nursing in room to administer meds    Vitals: Vitals not assessed per clinical judgement, see nursing flowsheet    Social/Functional History:    Lives With: Daughter, Spouse  Type of Home: Apartment  Home Layout: One level  Home Access: Stairs to enter without rails  Entrance Stairs - Number of Steps: 2 +1, has to complete a curb  Home Equipment: Walker, standard, Crutches, Grab bars     Bathroom Shower/Tub: Tub/Shower unit  Bathroom Equipment: Tub transfer bench    Receives Help From: Family  ADL Assistance: Independent  Homemaking Assistance: Independent  Ambulation Assistance: Independent  Transfer Assistance: Independent    Active : Yes  Occupation: Part time employment  Type of Occupation:   Additional Comments: IND and active prior, no use of an AD    OBJECTIVE:  Range of Motion:  Bilateral Lower Extremity: WNL    Strength:  Right Lower Extremity: WFL  Left Lower Extremity: Impaired - 4-/5 to 3+/5    Sensation: hyposensitive in L foot    Balance:  Static Sitting Balance:  Supervision, Stand By Assistance  Dynamic Sitting Balance: Stand By Assistance, Contact Guard Assistance  Static Standing Balance: Contact Guard Assistance, Minimal Assistance, X 1  Pt seated on EOB, increased time to recover from bed tasks due to increased pain. PT assisted with donning binder for mobility. Pt very tense sitting EOB, educated on relaxation and proper breathing mechanics. Bed Mobility:  Rolling to Left: Minimal Assistance, X 1, with rail, with verbal cues , with increased time for completion   Supine to Sit: Minimal Assistance, X 1, with head of bed raised, with rail, with verbal cues , with increased time for completion  Scooting: Contact Guard Assistance, Minimal Assistance, X 1 to scoot towards EOB  Assist for log roll technique, good demo, increased time due to increased pain, pt becoming tearful with completion due to increased pain. Assist for trunk support for seated on EOB. Transfers:  Sit to Stand: Moderate Assistance, X 1, with increased time for completion, cues for hand placement, with verbal cues  Stand to [address] [address], X 1, with increased time for completion, cues for hand placement, with verbal cues  RW for support, increased time for transitions, with increased pain reported. Slow movement and good control into sitting.    Ambulation:  Contact Guard Assistance, Minimal Assistance, X 1, with cues for safety, with verbal cues , with increased time for completion  Distance: 2 feet to chair, slow pace  Surface: Level Tile  Device:Rolling Walker  Gait Deviations:  Slow Marcela, Decreased Step Length Bilaterally, Decreased Weight Shift Bilaterally, Decreased Gait Speed, and Decreased Heel Strike Bilaterally, slight unsteady gait noted  Cues for safety, no LOB, increased overall pain, cues for sequencing with good demo. Functional Outcome Measures: Completed  AM-PAC Inpatient Mobility Raw Score : 15  AM-PAC Inpatient T-Scale Score : 39.45    ASSESSMENT:  Activity Tolerance:  Patient tolerance of  treatment: fair. Increased pain, however completed tasks asked of this PT, increased time to complete tasks      Treatment Initiated: Treatment and education initiated within context of evaluation. Evaluation time included review of current medical information, gathering information related to past medical, social and functional history, completion of standardized testing, formal and informal observation of tasks, assessment of data and development of plan of care and goals. Treatment time included skilled education and facilitation of tasks to increase safety and independence with functional mobility for improved independence and quality of life. Assessment: Body Structures, Functions, Activity Limitations Requiring Skilled Therapeutic Intervention: Decreased functional mobility , Decreased tolerance to work activity, Decreased strength, Decreased sensation, Decreased endurance, Increased pain, Decreased balance  Assessment: Emily Tovar is a 43 y.o. female who presents with the deficits stated previously. Pt requires 1 person assist for functional tasks with use of walker for support. Pt cont to require skilled PT services to increase IND with functional tasks and progress towards PLOF to return to home environment safely.    Therapy Prognosis: Good    Requires PT Follow-Up: Yes    Discharge Recommendations:  Discharge Recommendations: Continue to assess pending progress, 24 hour supervision or assist, IP Rehab    Patient Education:      . Patient Education  Education Given To: Patient  Education Provided: Role of Therapy, Plan of Care, Precautions, Transfer Training, Equipment, Family Education  Education Method: Verbal  Education Outcome: Verbalized understanding, Demonstrated understanding, Continued education needed       Equipment Recommendations:  Equipment Needed: Yes (depending on d/c plans)  Mobility Devices: Can Begin: Rolling    Plan:  Current Treatment Recommendations: Strengthening, Balance training, Functional mobility training, Transfer training, Endurance training, Gait training, Neuromuscular re-education, Stair training, Return to work related activity, Equipment evaluation, education, & procurement, Home exercise program, Safety education & training, Patient/Caregiver education & training, Therapeutic activities  General Plan:  (6x O)    Goals:  Patient Goals : decrease pain, improve mobility, and return home  Short Term Goals  Time Frame for Short Term Goals: by discharge  Short Term Goal 1: Pt will demo sit to /from stand transfers with RW and SBA for safety to progress with mobility. Short Term Goal 2: Pt will demo bed mobility tasks with good demo of log roll technique with S to progress with mobility. Short Term Goal 3: Pt will amb for 50 feet with RW with SBA to progress with mobility. Short Term Goal 4: Pt will negotiate steps with CGA for safety with LRAD to progress with mobility. Short Term Goal 5: Pt will demo SBA for car transfer with good technique to progress with mobility. Long Term Goals  Time Frame for Long Term Goals : NA due to short ELOS    Following session, patient left in safe position with all fall risk precautions in place. Pt in bedside chair following session, all needs and call light in reach, alarm on.

## 2023-02-09 NOTE — PROGRESS NOTES
[facility]  INPATIENT OCCUPATIONAL THERAPY  [facility] ORTHOPEDICS 7K  EVALUATION    Time:   Time In: 7699  Time Out: 1518  Timed Code Treatment Minutes: 13 Minutes  Minutes: 23          Date: 2023  Patient Name: Lai Rhodes,   Gender: female      MRN: [id number]  : 1980  (43 y.o.)  Referring Practitioner: Fabi Clinton PA-C  Diagnosis: lumbar radiculopathy  Additional Pertinent Hx: Lai Rhodes is a 43 y.o.  female with a medical history significant for asthma, depression, irregular heart rate, and headaches (migraine) with a surgical history significant for injection therapies provided by pain management the left knee surgery. She presents with ongoing low back pain with radiation to the knees bilaterally with occasional transient radiation to the feet. She also relates occasional urinary frequency and imbalance. She treats with pain management where she has received bilateral lumbar and medial branch blocks targeting facet joints at L4-5 and L5-S1. Pt underwent lumbar decompression instrumented fusion with concurrent discectomy. Restrictions/Precautions:  Restrictions/Precautions: General Precautions, Fall Risk, Surgical Protocols  Required Braces or Orthoses  Spinal: Lumbar Corset  Spinal Other: Per Lula Blandon via perfect serve, pt ok for abdominal binder until brace arrives  Position Activity Restriction  Spinal Precautions: No Bending, No Lifting, No Twisting  Other position/activity restrictions: hemovac drain    Subjective  Patient assessed for rehabilitation services?: Yes    Subjective: Nurse approved OT evaluation.  Pt in bed on OT arrival, initially requestst o be seen tomorrow but is agreeable to evaluation with education on importance of participation in therapy to progress to next level of care    Pain: 8/10: with bed mobility    Vitals: Vitals not assessed per clinical judgement, see nursing flowsheet    Social/Functional History:  Lives With: Daughter, Spouse  Type of Home: Apartment  Home Layout: One level  Home Access: Stairs to enter without rails  Entrance Stairs - Number of Steps: 2 +1, has to complete a curb  Home Equipment: Walker, standard, Crutches, Grab bars   Bathroom Shower/Tub: Tub/Shower unit  Bathroom Equipment: Tub transfer bench    Receives Help From: Family  ADL Assistance: Independent  Homemaking Assistance: Independent  Ambulation Assistance: Independent  Transfer Assistance: Independent    Active : Yes  Occupation: Part time employment  Type of Occupation:   Additional Comments: IND and active prior, no use of an AD    VISION:WFL    HEARING:  WFL    COGNITION: WFL    RANGE OF MOTION:  Bilateral Upper Extremity:  WFL    STRENGTH:  Bilateral Upper Extremity:  WFL    SENSATION:   Pt reports numbness in L LE-especially foot. Pt condones knee buckling when transferring back to bed with staff    ADL:   Lower Extremity Dressing: Maximum Assistance. Toileting: Dependent. Chandler catheter in place . BALANCE:  Sitting Balance:  Stand By Assistance. Standing Balance: Contact Guard Assistance, Minimal Assistance. CGA for static standing, Minimal assist for brief single UE release. Unable to safely stand to perform grooming in standing at the sink this date    BED MOBILITY:  Supine to Sit: Moderate Assistance, X 1, with head of bed raised, with verbal cues , with increased time for completion    Sit to Supine: Moderate Assistance      TRANSFERS:  Sit to Stand: Moderate Assistance. With cues for hand placement. Failed initially attempt without therapist assist.   Stand to Sit: Minimal Assistance. For controlled descend to bed    FUNCTIONAL MOBILITY:  Assistive Device: Rolling Walker  Assist Level:  Minimal Assistance.    Distance:  Pt takes side steps x3 to left and 3 to right with no evidence of obvious knee buckling but pt reports feeling unstable and does not wish to attempt ambulation at this time Activity Tolerance:  Patient tolerance of  treatment: good. Assessment:  Assessment: Lore Hooker is a 43 y. o.female that presents with above new performance deficits secondary to lumbar radiculopathy. Pt is requiring increased assistance for ADLs, functional mobility, ADL transfers compared to baseline level of function. Skilled OT services is warranted to improve above performance deficits and progress pt towards PLOF. Without OT pt is at risk for falls, further decline in functional abilities, increased caregiver burden, increased risk for medical complication as a result of reduce mobility and inability to return to prior level of living. Performance deficits / Impairments: Decreased functional mobility , Decreased ADL status, Decreased ROM, Decreased safe awareness, Decreased strength, Decreased endurance, Decreased high-level IADLs, Decreased balance, Decreased sensation  Prognosis: Good  REQUIRES OT FOLLOW-UP: Yes  Decision Making: Medium Complexity    Treatment Initiated: Treatment and education initiated within context of evaluation. Evaluation time included review of current medical information, gathering information related to past medical, social and functional history, completion of standardized testing, formal and informal observation of tasks, assessment of data and development of plan of care and goals. Treatment time included skilled education and facilitation of tasks to increase safety and independence with ADL's for improved functional independence and quality of life. Discharge Recommendations:  IP Rehab    Patient Education:     Patient Education  Education Given To: Patient  Education Provided: Role of Therapy, Plan of Care, Transfer Training, Fall Prevention Strategies, Precautions, Equipment    Equipment Recommendations:  Equipment Needed: Yes  Other: TBD    Plan:  Times Per Week: 6x  Times Per Day:  Once a day  Current Treatment Recommendations: Strengthening, Balance training, Functional mobility training, Endurance training, Neuromuscular re-education, Safety education & training, Patient/Caregiver education & training, Positioning, Self-Care / ADL, Home management training, Equipment evaluation, education, & procurement. See long-term goal time frame for expected duration of plan of care. If no long-term goals established, a short length of stay is anticipated. Goals:  Patient goals : To return home  Short Term Goals  Time Frame for Short Term Goals: until discharge  Short Term Goal 1: Pt will safely navigate to/from bathroom and short HH distances with CGA and no episodes of buckling in LEs to improve safe access to ADLs. Short Term Goal 2: Pt will tolerate x5 minute standing with 1-2 UE release and no LOB to improve independence with sinkside I/ADLs. Short Term Goal 3: Pt will perform LB dressing/bathing following spinal precautions with AE as needed to improve indep with BADL routine. Short Term Goal 4: Pt will retrieve 5 items from various surface heights maintaining spinal precautions without cues to improve indep with ADLs and IADLs. Following session, patient left in safe position with all fall risk precautions in place.

## 2023-02-09 NOTE — ANESTHESIA POSTPROCEDURE EVALUATION
Department of Anesthesiology  Postprocedure Note    Patient: Walker Gallegos  MRN: [id number]  YOB: 1980  Date of evaluation: 2/9/2023      Procedure Summary     Date: 02/08/23 Room / Location: [address] [facility]    Anesthesia Start: 3841 Anesthesia Stop: 9609    Procedure: LUMBAR DISCECTOMY OF L5-S1 WITH CONCURRENT POSTERIOR LUMBAR DECOMPRESSION AND INSTRUMENTED FUSION OF L5-S1 WITH INTERBODY IMPLANTS (Spine Lumbar) Diagnosis:       Lumbar radiculopathy      Herniated nucleus pulposus of lumbosacral region      (Lumbar radiculopathy [M54.16])      (Herniated nucleus pulposus of lumbosacral region [M51.27])    Surgeons: Adrian Bruce MD Responsible Provider: Kenzie Torres DO    Anesthesia Type: General ASA Status: 2          Anesthesia Type: General    Kirsty Phase I: Kirsty Score: 8    Kirsty Phase II:        Anesthesia Post Evaluation    Patient location during evaluation: PACU  Patient participation: complete - patient participated  Level of consciousness: awake and alert  Airway patency: patent  Nausea & Vomiting: no vomiting and no nausea  Complications: no  Cardiovascular status: hemodynamically stable  Respiratory status: acceptable  Hydration status: stable

## 2023-02-09 NOTE — PROGRESS NOTES
Hospitalist Progress Note    Patient:  Rochelle Cardona    Unit/Bed:7K-05/005-A  YOB: 1980  MRN: [id number]   Acct: [de-identified]   PCP: Joey Arzate  Code Status: Prior  Date of Admission: 2/8/2023      Disposition: Patient medically stable for discharge from Hospitalist standpoint. Will sign off. Please contact me directly with any questions or concerns. Assessment/Plan:    Lumbar discectomy of L5-S1 with posterior lumbar decompression and instrumentation fusion of L5-S1: Procedure 2/8 with Dr. Idalia Morrow- managed by primary. EBL 500cc. Pt reports pain is well controlled at this time. Awaiting PT/OT    Acute normocytic anemia[de-identified] 2/2 to #1. Hgb stable. Hyperglycemia: Mild. Suspect acute reaction from surgical intervention. No history of diabetes. Repeat BMP in the AM.     Hypokalemia: Mild, replace per protocol. Hx of palpitations: Follows with Dr. Edgard Dillon with Cardiology. Continue home metoprolol. HR remains well controlled. Hx of Depression: Stable. Continue home meds    Hx of migraines: Home Imatrex PRN ordered. Chief Complaint:  low back pain  Reason for consult  post-operative medical management    HPI / Hospital Course: Per initial consult note: \"Ivon Horner [age] y.o. female who we are asked to see/evaluate by Magda Bautista MD for post-operative medical management. Pt has a past medical history of depression, migraines, palpitations, cesarian section who presented today for a scheduled procedure with Dr. Idalia Morrow, neurosurgery due to poorly controlled conservative treatment for her back pain. Pt undwerwent surgery without complication today, per brief op note. She denies chest pain, shortness of breath, abdominal pain at this time. She reports no pain at rest, significant pain with activity and movement since surgery. Pt expressed no complaints at this time. \"     Subjective (past 24 hours): Patient reports back pain postop. Patient working with physical therapy.   She denies fever/chills, shortness of breath, chest pain. Patient is medically stable. ROS: Pertinent positives as noted in HPI. All other systems reviewed and negative. Past medical history, family history, social history and allergies reviewed again and is unchanged since admission. Medications:  Reviewed. Infusion Medications    sodium chloride      sodium chloride 75 mL/hr at 02/09/23 0153     Scheduled Medications    metoprolol succinate  25 mg Oral Daily    venlafaxine  150 mg Oral Daily    venlafaxine  75 mg Oral Daily    busPIRone  5 mg Oral Daily    mupirocin   Nasal Daily    amoxicillin-clavulanate  1 tablet Oral BID    pantoprazole  40 mg Oral QAM AC    sodium chloride flush  5-40 mL IntraVENous 2 times per day    ceFAZolin  2,000 mg IntraVENous Q8H     PRN Meds: albuterol sulfate HFA, albuterol, SUMAtriptan, sodium chloride flush, sodium chloride, morphine **OR** morphine, HYDROcodone 5 mg - acetaminophen, cyclobenzaprine    I/O:     Intake/Output Summary (Last 24 hours) at 2/9/2023 0841  Last data filed at 2/9/2023 0306  Gross per 24 hour   Intake 3607.67 ml   Output 2655 ml   Net 952.67 ml       Diet:  ADULT DIET; Regular    Exam:  BP (!) 100/55   Pulse 59   Temp 97.9 °F (36.6 °C) (Oral)   Resp 18   Ht 5' 2\" (1.575 m)   Wt 180 lb 6.4 oz (81.8 kg)   LMP 01/27/2023   SpO2 98%   BMI 33.00 kg/m²   General:   Pleasant female. NAD. HEENT:  normocephalic and atraumatic. No scleral icterus. PERR. Neck: supple. No JVD. No thyromegaly. Lungs: clear to auscultation. No retractions  Cardiac: RRR without murmur. Abdomen: soft. Nontender. Bowel sounds positive. Extremities:  No clubbing, cyanosis, or edema x 4. Vasculature: capillary refill < 3 seconds. Palpable LE pulses bilaterally. Skin:  warm and dry. Psych:  Alert and oriented x3. Affect appropriate  Lymph:  No supraclavicular adenopathy. Neurologic:  No focal deficit. No seizures.       Data: (All radiographs, tracings, PFTs, and imaging are personally viewed and interpreted unless otherwise noted)  Labs:   Recent Labs     02/08/23 2043 02/09/23  0616   WBC 10.1 9.3   HGB 11.2* 10.8*   HCT 32.7* 32.5*    202     Recent Labs     02/08/23 2043 02/09/23  0616    144   K 3.6 3.4*    110   CO2 24 24   BUN 5* 6*   CREATININE 0.8 0.7   CALCIUM 7.5* 7.5*     No results for input(s): AST, ALT, BILIDIR, BILITOT, ALKPHOS in the last 72 hours. No results for input(s): INR in the last 72 hours. No results for input(s): Ceil Coke in the last 72 hours. Urinalysis:   No results found for: NITRU, WBCUA, BACTERIA, RBCUA, BLOODU, SPECGRAV, GLUCOSEU  Urine culture: No results found for: LABURIN  Micro:   Blood culture #1: No results found for: BC  Blood culture #2:No results found for: Pixie Presume  Organism:No results found for: ORG    No results found for: LABGRAM  MRSA culture only:No results found for: [facility]  Respiratory culture: No results found for: CULTRESP  Aerobic and Anaerobic :  No results found for: LABAERO  No results found for: LABANAE    Radiology Reports:  XR LUMBAR SPINE (2-3 VIEWS)   Final Result   FINDINGS/IMPRESSION:   There are laminectomies at L5 and S1 with interbody fusion/spacer device at L5-S1 and bilateral pedicle screws at L5 and S1 with interconnecting rods. The right L5 pedicle screw appears to be medialized. There is no evidence of hardware failure. Please    refer to the operative report for further information. **This report has been created using voice recognition software. It may contain minor errors which are inherent in voice recognition technology. **      Final report electronically signed by Dr. Melissa Orozco MD on 2/8/2023 2:54 PM      [facility] FOR SURGICAL PROCEDURES   Final Result        XR LUMBAR SPINE (2-3 VIEWS)    Result Date: 2/8/2023  PROCEDURE: XR LUMBAR SPINE (2-3 VIEWS) CLINICAL INFORMATION: surgery, COMPARISON: MRI lumbar spine dated 12/22/2022. TECHNIQUE: AP and lateral views of the lumbosacral junction     FINDINGS/IMPRESSION: There are laminectomies at L5 and S1 with interbody fusion/spacer device at L5-S1 and bilateral pedicle screws at L5 and S1 with interconnecting rods. The right L5 pedicle screw appears to be medialized. There is no evidence of hardware failure. Please refer to the operative report for further information. **This report has been created using voice recognition software. It may contain minor errors which are inherent in voice recognition technology. ** Final report electronically signed by Dr. Lela Maxwell MD on 2/8/2023 2:54 PM    [facility] FOR SURGICAL PROCEDURES    Result Date: 2/8/2023  Radiology exam is complete. No Radiologist dictation. Please follow up with ordering provider.            Active Hospital Problems    Diagnosis Date Noted    Lumbar radiculopathy [M54.16] 02/08/2023     Priority: Medium       Electronically signed by Era Gayle PA-C on 2/9/2023 at 8:41 AM

## 2023-02-09 NOTE — PLAN OF CARE
Problem: Discharge Planning  Goal: Discharge to home or other facility with appropriate resources  Flowsheets (Taken 2/9/2023 0451)  Discharge to home or other facility with appropriate resources:   Identify barriers to discharge with patient and caregiver   Identify discharge learning needs (meds, wound care, etc)     Problem: Pain  Goal: Verbalizes/displays adequate comfort level or baseline comfort level  Flowsheets (Taken 2/9/2023 0451)  Verbalizes/displays adequate comfort level or baseline comfort level:   Encourage patient to monitor pain and request assistance   Assess pain using appropriate pain scale   Administer analgesics based on type and severity of pain and evaluate response     Problem: ABCDS Injury Assessment  Goal: Absence of physical injury  Flowsheets (Taken 2/9/2023 0451)  Absence of Physical Injury: Implement safety measures based on patient assessment  Note: Patient has remained free of physical injury during this shift. Safe environment provided, call light within reach, and hourly rounding completed. Problem: Skin/Tissue Integrity  Goal: Absence of new skin breakdown  Description: 1. Monitor for areas of redness and/or skin breakdown  2. Assess vascular access sites hourly  3. Every 4-6 hours minimum:  Change oxygen saturation probe site  4. Every 4-6 hours:  If on nasal continuous positive airway pressure, respiratory therapy assess nares and determine need for appliance change or resting period. Note: No new skin breakdown    Care plan reviewed with patient. Patient  verbalize understanding of the plan of care and contribute to goal setting.

## 2023-02-09 NOTE — PROGRESS NOTES
Neurosurgery Progress Note    Patient:  Susanne Hemp      Unit/Bed:7-05/005-A    YOB: 1980    MRN: [id number]     Acct: [de-identified]     Admit date: 2/8/2023    No chief complaint on file. Patient Seen, Chart, Physician notes, Labs, Radiology studies reviewed. Subjective: Patient is seen and evaluated on the floor with evaluation exam findings reviewed and discussed with Dr. Lena Mart with nursing. Patient is resting comfortably postoperative day #1 from lumbar decompression instrumented fusion with concurrent discectomy. Pain is well to moderately controlled with some incisional site discomfort noted this morning on exam.        Past, Family, Social History unchanged from admission. Diet:  ADULT DIET; Regular    Medications:  Scheduled Meds:   metoprolol succinate  25 mg Oral Daily    venlafaxine  150 mg Oral Daily    venlafaxine  75 mg Oral Daily    busPIRone  5 mg Oral Daily    mupirocin   Nasal Daily    amoxicillin-clavulanate  1 tablet Oral BID    pantoprazole  40 mg Oral QAM AC    sodium chloride flush  5-40 mL IntraVENous 2 times per day    ceFAZolin  2,000 mg IntraVENous Q8H     Continuous Infusions:   sodium chloride      sodium chloride 75 mL/hr at 02/09/23 0153     PRN Meds:albuterol sulfate HFA, albuterol, SUMAtriptan, sodium chloride flush, sodium chloride, morphine **OR** morphine, HYDROcodone 5 mg - acetaminophen, cyclobenzaprine    Objective: Patient is lying in bed with head of the bed mildly elevated and pain well to moderately controlled. Dressings are intact over flat dry incision and the surgical drain is intact and functioning this morning with approximately 130 cc of output noted. She demonstrates purposeful movement x4 obeys commands with some improvement for lower extremity strength noted on exam this morning.     Vitals: BP (!) 100/55   Pulse 59   Temp 97.9 °F (36.6 °C) (Oral)   Resp 18   Ht 5' 2\" (1.575 m)   Wt 180 lb 6.4 oz (81.8 kg)   LMP 01/27/2023   SpO2 98%   BMI 33.00 kg/m²     Physical Exam     Physical Exam:  Alert and attentive. Language appropriate, with no aphasia. Pupils equal.  Facial strength symmetric. Review of Systems     Constitutional:  Positive for activity change. Respiratory:  Negative for apnea, chest tightness and shortness of breath. Cardiovascular:  Negative for chest pain and leg swelling. Gastrointestinal:  Positive for abdominal pain. Negative for abdominal distention. Musculoskeletal:  Positive for back pain. Neurological:  Positive for weakness. Negative for dizziness, numbness and headaches. Psychiatric/Behavioral:  Negative for agitation, behavioral problems, confusion and decreased concentration. 24 hour intake/output:  Intake/Output Summary (Last 24 hours) at 2/9/2023 0724  Last data filed at 2/9/2023 0306  Gross per 24 hour   Intake 3657.67 ml   Output 2655 ml   Net 1002.67 ml     Last 3 weights: Wt Readings from Last 3 Encounters:   02/08/23 180 lb 6.4 oz (81.8 kg)   02/02/23 186 lb (84.4 kg)   02/01/23 186 lb (84.4 kg)         CBC:   Recent Labs     02/08/23 2043 02/09/23  0616   WBC 10.1 9.3   HGB 11.2* 10.8*    202     BMP:    Recent Labs     02/08/23 2043 02/09/23  0616    144   K 3.6 3.4*    110   CO2 24 24   BUN 5* 6*   CREATININE 0.8 0.7   GLUCOSE 141* 110*     Calcium:  Recent Labs     02/09/23  0616   CALCIUM 7.5*     Magnesium:No results for input(s): MG in the last 72 hours. Glucose:No results for input(s): POCGLU in the last 72 hours. HgbA1C: No results for input(s): LABA1C in the last 72 hours. Lipids: No results for input(s): CHOL, TRIG, HDL, LDL, LDLCALC in the last 72 hours. Radiology reports as per the Radiologist  Radiology: XR LUMBAR SPINE (2-3 VIEWS)    Result Date: 2/8/2023  PROCEDURE: XR LUMBAR SPINE (2-3 VIEWS) CLINICAL INFORMATION: surgery, COMPARISON: MRI lumbar spine dated 12/22/2022.  TECHNIQUE: AP and lateral views of the lumbosacral junction     FINDINGS/IMPRESSION: There are laminectomies at L5 and S1 with interbody fusion/spacer device at L5-S1 and bilateral pedicle screws at L5 and S1 with interconnecting rods. The right L5 pedicle screw appears to be medialized. There is no evidence of hardware failure. Please refer to the operative report for further information. **This report has been created using voice recognition software. It may contain minor errors which are inherent in voice recognition technology. ** Final report electronically signed by Dr. Radha Rankin MD on 2/8/2023 2:54 PM    FLUORO FOR SURGICAL PROCEDURES    Result Date: 2/8/2023  Radiology exam is complete. No Radiologist dictation. Please follow up with ordering provider. Assessment: Status postoperative day 1 from lumbar decompression and instrumented fusion following discectomy as performed with Dr. Hermila Beatty, without complication. Principal Problem:    Lumbar radiculopathy  Resolved Problems:    * No resolved hospital problems. *        Plan: Patient is seen and evaluated on the floor with valuation exam findings reviewed and discussed with Dr. Hermila Beatty and with nursing. Patient is lying in bed comfortably with pain well to moderately controlled postoperative day 1 from lumbar decompression and instrumented fusion with concurrent discectomy. Dressings are intact over flat dry incision and the surgical drain is intact and functioning this morning at 130 cc per shift. A lumbar LSO support brace has been ordered to be fitted and worn when active with PT and OT and up to a chair as tolerated.   Neurosurgery to follow      Electronically signed by Kedar Frederick PA-C on 2/9/2023 at 7:24 AM    Neurosurgery

## 2023-02-10 PROBLEM — Z41.9 ELECTIVE SURGERY: Status: ACTIVE | Noted: 2023-02-10

## 2023-02-10 LAB
ANION GAP SERPL CALC-SCNC: 10 MEQ/L (ref 8–16)
BASOPHILS ABSOLUTE: 0 THOU/MM3 (ref 0–0.1)
BASOPHILS NFR BLD AUTO: 0.6 %
BUN SERPL-MCNC: 6 MG/DL (ref 7–22)
CALCIUM SERPL-MCNC: 8 MG/DL (ref 8.5–10.5)
CHLORIDE SERPL-SCNC: 108 MEQ/L (ref 98–111)
CO2 SERPL-SCNC: 23 MEQ/L (ref 23–33)
CREAT SERPL-MCNC: 0.6 MG/DL (ref 0.4–1.2)
DEPRECATED RDW RBC AUTO: 48.8 FL (ref 35–45)
EOSINOPHIL NFR BLD AUTO: 1.6 %
EOSINOPHILS ABSOLUTE: 0.1 THOU/MM3 (ref 0–0.4)
ERYTHROCYTE [DISTWIDTH] IN BLOOD BY AUTOMATED COUNT: 14.1 % (ref 11.5–14.5)
GFR SERPL CREATININE-BSD FRML MDRD: > 60 ML/MIN/1.73M2
GLUCOSE SERPL-MCNC: 96 MG/DL (ref 70–108)
HCT VFR BLD AUTO: 31.4 % (ref 37–47)
HGB BLD-MCNC: 10.5 GM/DL (ref 12–16)
IMM GRANULOCYTES # BLD AUTO: 0.04 THOU/MM3 (ref 0–0.07)
IMM GRANULOCYTES NFR BLD AUTO: 0.6 %
LYMPHOCYTES ABSOLUTE: 1.5 THOU/MM3 (ref 1–4.8)
LYMPHOCYTES NFR BLD AUTO: 22.5 %
MCH RBC QN AUTO: 31.6 PG (ref 26–33)
MCHC RBC AUTO-ENTMCNC: 33.4 GM/DL (ref 32.2–35.5)
MCV RBC AUTO: 94.6 FL (ref 81–99)
MONOCYTES ABSOLUTE: 0.5 THOU/MM3 (ref 0.4–1.3)
MONOCYTES NFR BLD AUTO: 6.9 %
NEUTROPHILS NFR BLD AUTO: 67.8 %
NRBC BLD AUTO-RTO: 0 /100 WBC
PLATELET # BLD AUTO: 156 THOU/MM3 (ref 130–400)
PMV BLD AUTO: 9.3 FL (ref 9.4–12.4)
POTASSIUM SERPL-SCNC: 3.6 MEQ/L (ref 3.5–5.2)
RBC # BLD AUTO: 3.32 MILL/MM3 (ref 4.2–5.4)
SEGMENTED NEUTROPHILS ABSOLUTE COUNT: 4.6 THOU/MM3 (ref 1.8–7.7)
SODIUM SERPL-SCNC: 141 MEQ/L (ref 135–145)
WBC # BLD AUTO: 6.8 THOU/MM3 (ref 4.8–10.8)

## 2023-02-10 PROCEDURE — 6370000000 HC RX 637 (ALT 250 FOR IP): Performed by: PHYSICIAN ASSISTANT

## 2023-02-10 PROCEDURE — 97116 GAIT TRAINING THERAPY: CPT

## 2023-02-10 PROCEDURE — 36415 COLL VENOUS BLD VENIPUNCTURE: CPT

## 2023-02-10 PROCEDURE — 80048 BASIC METABOLIC PNL TOTAL CA: CPT

## 2023-02-10 PROCEDURE — 97535 SELF CARE MNGMENT TRAINING: CPT

## 2023-02-10 PROCEDURE — 2580000003 HC RX 258: Performed by: PHYSICIAN ASSISTANT

## 2023-02-10 PROCEDURE — 6360000002 HC RX W HCPCS: Performed by: PHYSICIAN ASSISTANT

## 2023-02-10 PROCEDURE — 97530 THERAPEUTIC ACTIVITIES: CPT

## 2023-02-10 PROCEDURE — 1200000000 HC SEMI PRIVATE

## 2023-02-10 PROCEDURE — APPSS60 APP SPLIT SHARED TIME 46-60 MINUTES: Performed by: PHYSICIAN ASSISTANT

## 2023-02-10 PROCEDURE — 85025 COMPLETE CBC W/AUTO DIFF WBC: CPT

## 2023-02-10 RX ORDER — SENNA AND DOCUSATE SODIUM 50; 8.6 MG/1; MG/1
2 TABLET, FILM COATED ORAL 2 TIMES DAILY PRN
Status: DISCONTINUED | OUTPATIENT
Start: 2023-02-10 | End: 2023-02-11 | Stop reason: HOSPADM

## 2023-02-10 RX ORDER — BISACODYL 10 MG
10 SUPPOSITORY, RECTAL RECTAL DAILY PRN
Status: DISCONTINUED | OUTPATIENT
Start: 2023-02-10 | End: 2023-02-11 | Stop reason: HOSPADM

## 2023-02-10 RX ORDER — POLYETHYLENE GLYCOL 3350 17 G/17G
17 POWDER, FOR SOLUTION ORAL DAILY
Status: DISCONTINUED | OUTPATIENT
Start: 2023-02-10 | End: 2023-02-11 | Stop reason: HOSPADM

## 2023-02-10 RX ORDER — DOCUSATE SODIUM 100 MG/1
100 CAPSULE, LIQUID FILLED ORAL 2 TIMES DAILY
Status: DISCONTINUED | OUTPATIENT
Start: 2023-02-10 | End: 2023-02-11 | Stop reason: HOSPADM

## 2023-02-10 RX ADMIN — HYDROCODONE BITARTRATE AND ACETAMINOPHEN 1 TABLET: 5; 325 TABLET ORAL at 06:30

## 2023-02-10 RX ADMIN — DOCUSATE SODIUM 100 MG: 100 CAPSULE, LIQUID FILLED ORAL at 09:20

## 2023-02-10 RX ADMIN — SODIUM CHLORIDE, PRESERVATIVE FREE 10 ML: 5 INJECTION INTRAVENOUS at 21:08

## 2023-02-10 RX ADMIN — HYDROCODONE BITARTRATE AND ACETAMINOPHEN 1 TABLET: 5; 325 TABLET ORAL at 21:08

## 2023-02-10 RX ADMIN — DOCUSATE SODIUM 100 MG: 100 CAPSULE, LIQUID FILLED ORAL at 21:08

## 2023-02-10 RX ADMIN — VENLAFAXINE HYDROCHLORIDE 150 MG: 150 CAPSULE, EXTENDED RELEASE ORAL at 08:32

## 2023-02-10 RX ADMIN — DOCUSATE SODIUM 50 MG AND SENNOSIDES 8.6 MG 2 TABLET: 8.6; 5 TABLET, FILM COATED ORAL at 21:08

## 2023-02-10 RX ADMIN — POLYETHYLENE GLYCOL 3350 17 G: 17 POWDER, FOR SOLUTION ORAL at 09:20

## 2023-02-10 RX ADMIN — VENLAFAXINE HYDROCHLORIDE 75 MG: 75 CAPSULE, EXTENDED RELEASE ORAL at 08:36

## 2023-02-10 RX ADMIN — PANTOPRAZOLE SODIUM 40 MG: 40 TABLET, DELAYED RELEASE ORAL at 06:30

## 2023-02-10 RX ADMIN — HYDROCODONE BITARTRATE AND ACETAMINOPHEN 1 TABLET: 5; 325 TABLET ORAL at 11:31

## 2023-02-10 RX ADMIN — Medication 2000 MG: at 15:58

## 2023-02-10 RX ADMIN — HYDROCODONE BITARTRATE AND ACETAMINOPHEN 1 TABLET: 5; 325 TABLET ORAL at 02:36

## 2023-02-10 RX ADMIN — BUSPIRONE HYDROCHLORIDE 5 MG: 5 TABLET ORAL at 08:33

## 2023-02-10 RX ADMIN — Medication 2000 MG: at 08:37

## 2023-02-10 RX ADMIN — CYCLOBENZAPRINE 10 MG: 10 TABLET, FILM COATED ORAL at 02:36

## 2023-02-10 RX ADMIN — Medication 2000 MG: at 23:47

## 2023-02-10 RX ADMIN — SODIUM CHLORIDE, PRESERVATIVE FREE 10 ML: 5 INJECTION INTRAVENOUS at 08:35

## 2023-02-10 RX ADMIN — MUPIROCIN: 20 OINTMENT TOPICAL at 08:33

## 2023-02-10 ASSESSMENT — PAIN DESCRIPTION - ONSET
ONSET: ON-GOING

## 2023-02-10 ASSESSMENT — PAIN DESCRIPTION - FREQUENCY
FREQUENCY: CONTINUOUS

## 2023-02-10 ASSESSMENT — PAIN DESCRIPTION - DESCRIPTORS
DESCRIPTORS: ACHING

## 2023-02-10 ASSESSMENT — PAIN DESCRIPTION - PAIN TYPE
TYPE: ACUTE PAIN;SURGICAL PAIN

## 2023-02-10 ASSESSMENT — PAIN SCALES - GENERAL
PAINLEVEL_OUTOF10: 7
PAINLEVEL_OUTOF10: 6

## 2023-02-10 ASSESSMENT — PAIN - FUNCTIONAL ASSESSMENT
PAIN_FUNCTIONAL_ASSESSMENT: PREVENTS OR INTERFERES SOME ACTIVE ACTIVITIES AND ADLS

## 2023-02-10 ASSESSMENT — PAIN DESCRIPTION - ORIENTATION
ORIENTATION: LOWER

## 2023-02-10 ASSESSMENT — PAIN DESCRIPTION - LOCATION
LOCATION: BACK

## 2023-02-10 NOTE — PROGRESS NOTES
Neurosurgery Progress Note    Patient:  Heide Espinoza      Unit/Bed:7K-05/005-A    YOB: 1980    MRN: [id number]     Acct: [de-identified]     Admit date: 2/8/2023    No chief complaint on file. Patient Seen, Chart, Physician notes, Labs, Radiology studies reviewed. Subjective: Patient is seen and evaluated on the floor postoperative day 2 from lumbar decompression and instrumented fusion with concurrent discectomy as performed by Dr. Zuri Sanabria, without complication. Pain is well to moderately controlled with some incisional site discomfort noted. Past, Family, Social History unchanged from admission. Diet:  ADULT DIET; Regular  ADULT ORAL NUTRITION SUPPLEMENT; Breakfast, Lunch, Dinner; Other Oral Supplement; Activia Yogurt with breakfast; Hot beverage with all meals    Medications:  Scheduled Meds:   docusate sodium  100 mg Oral BID    polyethylene glycol  17 g Oral Daily    metoprolol succinate  25 mg Oral Daily    venlafaxine  150 mg Oral Daily    venlafaxine  75 mg Oral Daily    busPIRone  5 mg Oral Daily    mupirocin   Nasal Daily    pantoprazole  40 mg Oral QAM AC    sodium chloride flush  5-40 mL IntraVENous 2 times per day    ceFAZolin  2,000 mg IntraVENous Q8H     Continuous Infusions:   sodium chloride       PRN Meds:sennosides-docusate sodium, bisacodyl, potassium chloride **OR** potassium alternative oral replacement **OR** potassium chloride, albuterol sulfate HFA, albuterol, SUMAtriptan, sodium chloride flush, sodium chloride, morphine **OR** morphine, HYDROcodone 5 mg - acetaminophen, cyclobenzaprine    Objective: Patient was observed sitting up in a chair and transitioning from chair to bed with assistance from nursing utilizing a rolling four-point walker. Dressings were changed over flat dry incision following removal of the surgical drain performed at bedside this morning.   She continues with purposeful movement x4 obeys commands and is realizing improvement for strength and sensation for the bilateral lower extremities    Vitals: BP (!) 95/56   Pulse 72   Temp 98.4 °F (36.9 °C) (Oral)   Resp 18   Ht 5' 2\" (1.575 m)   Wt 180 lb 6.4 oz (81.8 kg)   LMP 01/27/2023   SpO2 98%   BMI 33.00 kg/m²     Physical Exam     Physical Exam:  Alert and attentive. Language appropriate, with no aphasia. Pupils equal.  Facial strength symmetric. Review of Systems     Constitutional:  Positive for activity change. Respiratory:  Negative for apnea, chest tightness and shortness of breath. Cardiovascular:  Negative for chest pain and leg swelling. Gastrointestinal:  Positive for abdominal pain. Negative for abdominal distention. Musculoskeletal:  Positive for back pain. Neurological:  Positive for weakness. Negative for dizziness, numbness and headaches. Psychiatric/Behavioral:  Negative for agitation, behavioral problems, confusion and decreased concentration. 24 hour intake/output:  Intake/Output Summary (Last 24 hours) at 2/10/2023 1002  Last data filed at 2/10/2023 0314  Gross per 24 hour   Intake 1120 ml   Output 1560 ml   Net -440 ml     Last 3 weights: Wt Readings from Last 3 Encounters:   02/08/23 180 lb 6.4 oz (81.8 kg)   02/02/23 186 lb (84.4 kg)   02/01/23 186 lb (84.4 kg)         CBC:   Recent Labs     02/08/23 2043 02/09/23  0616 02/10/23  0658   WBC 10.1 9.3 6.8   HGB 11.2* 10.8* 10.5*    202 156     BMP:    Recent Labs     02/08/23 2043 02/09/23  0616 02/10/23  0658    144 141   K 3.6 3.4* 3.6    110 108   CO2 24 24 23   BUN 5* 6* 6*   CREATININE 0.8 0.7 0.6   GLUCOSE 141* 110* 96     Calcium:  Recent Labs     02/10/23  0658   CALCIUM 8.0*     Magnesium:  Recent Labs     02/09/23  0616   MG 2.1     Glucose:No results for input(s): POCGLU in the last 72 hours. HgbA1C: No results for input(s): LABA1C in the last 72 hours.   Lipids: No results for input(s): CHOL, TRIG, HDL, LDL, LDLCALC in the last 72 hours. Radiology reports as per the Radiologist  Radiology: XR LUMBAR SPINE (2-3 VIEWS)    Result Date: 2/8/2023  PROCEDURE: XR LUMBAR SPINE (2-3 VIEWS) CLINICAL INFORMATION: surgery, COMPARISON: MRI lumbar spine dated 12/22/2022. TECHNIQUE: AP and lateral views of the lumbosacral junction     FINDINGS/IMPRESSION: There are laminectomies at L5 and S1 with interbody fusion/spacer device at L5-S1 and bilateral pedicle screws at L5 and S1 with interconnecting rods. The right L5 pedicle screw appears to be medialized. There is no evidence of hardware failure. Please refer to the operative report for further information. **This report has been created using voice recognition software. It may contain minor errors which are inherent in voice recognition technology. ** Final report electronically signed by Dr. Tom Bernardo MD on 2/8/2023 2:54 PM    FLUORO FOR SURGICAL PROCEDURES    Result Date: 2/8/2023  Radiology exam is complete. No Radiologist dictation. Please follow up with ordering provider. Assessment: Status postoperative day 2 from lumbar decompression and instrumented fusion with concurrent discectomy as performed by Dr. Katherine Greenfield, without complication    Principal Problem:    Lumbar radiculopathy  Active Problems:    Hypokalemia  Resolved Problems:    * No resolved hospital problems. *        Plan: The patient is seen and evaluated on the floor with evaluation exam findings reviewed and discussed with Dr. Katherine Greenfield and with nursing. Patient is observed ambulating with assistance from nursing as she transition from chair to bed to facilitate removal of the surgical drain performed at bedside this morning without complication. Dressings were changed over a flat dry incision following removal of the surgical drain. She is ambulating with assistance with lumbar LSO brace and voiding freely.   An aggressive bowel protocol is been instigated to address constipation with an anticipated discharge home with self-care planned for tomorrow.   Neurosurgery to follow      Electronically signed by Davian Polanco PA-C on 2/10/2023 at 10:02 AM    Neurosurgery

## 2023-02-10 NOTE — PLAN OF CARE
Problem: Discharge Planning  Goal: Discharge to home or other facility with appropriate resources  Outcome: Progressing  Flowsheets (Taken 2/10/2023 1427)  Discharge to home or other facility with appropriate resources:   Identify barriers to discharge with patient and caregiver   Arrange for needed discharge resources and transportation as appropriate   Identify discharge learning needs (meds, wound care, etc)  Note: Patient to be discharged home with spouse when medically stable. Problem: Pain  Goal: Verbalizes/displays adequate comfort level or baseline comfort level  Outcome: Progressing  Flowsheets (Taken 2/10/2023 1427)  Verbalizes/displays adequate comfort level or baseline comfort level:   Encourage patient to monitor pain and request assistance   Assess pain using appropriate pain scale   Administer analgesics based on type and severity of pain and evaluate response   Implement non-pharmacological measures as appropriate and evaluate response  Note: Patient's pain managed with rest, repositioning, relaxation and medication PRN. Patient satisfied with pain reducing measures. Problem: ABCDS Injury Assessment  Goal: Absence of physical injury  Outcome: Progressing  Flowsheets (Taken 2/10/2023 1427)  Absence of Physical Injury: Implement safety measures based on patient assessment  Note: No falls noted this shift. Patient ambulates with x1 staff assistance without difficulty. Bed kept in low position. Safe environment maintained. Bedside table & call light in reach. Uses call light appropriately when needing assistance. Problem: Skin/Tissue Integrity  Goal: Absence of new skin breakdown  Description: 1. Monitor for areas of redness and/or skin breakdown  2. Assess vascular access sites hourly  3. Every 4-6 hours minimum:  Change oxygen saturation probe site  4.   Every 4-6 hours:  If on nasal continuous positive airway pressure, respiratory therapy assess nares and determine need for appliance change or resting period. Outcome: Progressing  Note: No skin break down noted at this time. Encouraged patient to reposition self in bed. Care plan reviewed with patient. Patient verbalize understanding of the plan of care and contribute to goal setting.

## 2023-02-10 NOTE — PROGRESS NOTES
[facility]  Occupational Therapy  Daily Note  Time:   Time In: 0910  Time Out: 7304  Timed Code Treatment Minutes: 28 Minutes  Minutes: 28          Date: 2/10/2023  Patient Name: Leland Resendiz,   Gender: female      Room: [facility]05/005-A  MRN: [id number]  : 1980  (43 y.o.)  Referring Practitioner: Alphonse Hicks PA-C  Diagnosis: lumbar radiculopathy  Additional Pertinent Hx: Leland Resendiz is a 43 y.o.  female with a medical history significant for asthma, depression, irregular heart rate, and headaches (migraine) with a surgical history significant for injection therapies provided by pain management the left knee surgery. She presents with ongoing low back pain with radiation to the knees bilaterally with occasional transient radiation to the feet. She also relates occasional urinary frequency and imbalance. She treats with pain management where she has received bilateral lumbar and medial branch blocks targeting facet joints at L4-5 and L5-S1. Pt underwent lumbar decompression instrumented fusion with concurrent discectomy. Restrictions/Precautions:  Restrictions/Precautions: General Precautions, Fall Risk, Surgical Protocols  Required Braces or Orthoses  Spinal: Lumbar Corset  Spinal Other: lumbar corset  Position Activity Restriction  Spinal Precautions: No Bending, No Lifting, No Twisting  Other position/activity restrictions: drain removed 2/10     SUBJECTIVE: Patient sidelying upon arrival with nursing present, noting patient recently had drain removed, patient agreeable to therapy. Patient pleasant and cooperative throughout    PAIN: 0/10:     Vitals: Vitals not assessed per clinical judgement, see nursing flowsheet    COGNITION: WFL    ADL:   Grooming: Supervision. Lower Extremity Dressing: Supervision. Seated for education regarding LB dressing with reacher, verbalizing understanding  Footwear Management: Supervision.   Seated EOB utilizing sock aid with verbal/visual demonstration/education  Toileting: Supervision. Toilet Transfer: Supervision. Ailyn Hatch BALANCE:  Sitting Balance:  Modified Independent. Standing Balance: Supervision. RW, no LOB    BED MOBILITY:  Supine to Sit: Supervision, X 1, with head of bed flat, without rail, with verbal cues , with increased time for completion      TRANSFERS:  Sit to Stand:  Supervision. Stand to Sit: Supervision. FUNCTIONAL MOBILITY:  Assistive Device: Rolling Walker  Assist Level:  Supervision. Distance: To and from bathroom  Slow pace, no LOB     ADDITIONAL ACTIVITIES:  Patient completed BUE strengthening exercises with skilled education on HEP: completed x10 reps x1 set with a 2lb hand weight in all joints and all planes in order to improve UE strength and activity tolerance required for BADL routine and toilet / shower transfers. Patient tolerated good, requiring minimal rest breaks. Patient also required minimal verbal/visual cues for technique. ASSESSMENT:     Activity Tolerance:  Patient tolerance of  treatment: good. Discharge Recommendations: Home with assist as needed  Equipment Recommendations: Equipment Needed: Yes  Other: TBD  Plan: Times Per Week: 6x  Times Per Day:  Once a day  Current Treatment Recommendations: Strengthening, Balance training, Functional mobility training, Endurance training, Neuromuscular re-education, Safety education & training, Patient/Caregiver education & training, Positioning, Self-Care / ADL, Home management training, Equipment evaluation, education, & procurement    Patient Education  Patient Education: Role of OT, Plan of Care, ADL's, IADL's, Energy Conservation, Home Exercise Program, Precautions, Equipment Education, Home Safety, Importance of Increasing Activity, and Assistive Device Safety    Goals  Short Term Goals  Time Frame for Short Term Goals: until discharge  Short Term Goal 1: Pt will safely navigate to/from bathroom and [facility] distances with CGA and no episodes of buckling in LEs to improve safe access to ADLs. MET; REVISED  Short Term Goal 2: Pt will tolerate x5 minute standing with 1-2 UE release at CGA and no LOB to improve independence with sinkside I/ADLs. MET; REVISED  Short Term Goal 3: Pt will perform LB dressing/bathing following spinal precautions with Min A and AE as needed to improve indep with BADL routine. MET; REVISED  Short Term Goal 4: Pt will retrieve 5 items from various surface heights with CGA maintaining spinal precautions without cues to improve indep with ADLs and IADLs. NOT MET; CONTINUE    Short Term Goals  Time Frame for Short Term Goals: until discharge  Short Term Goal 1: Pt will safely navigate to/from bathroom and short  distances with Mod I improve safe access to ADLs. Short Term Goal 2: Pt will tolerate x8 minute standing with 1-2 UE release at Mod I to improve independence with sinkside I/ADLs. Short Term Goal 3: Pt will perform LB dressing/bathing following spinal precautions with Mod I and AE as needed to improve indep with BADL routine. Short Term Goal 4: Pt will retrieve 5 items from various surface heights with CGA maintaining spinal precautions without cues to improve indep with ADLs and IADLs. Following session, patient left in safe position with all fall risk precautions in place.

## 2023-02-10 NOTE — PLAN OF CARE
Problem: Discharge Planning  Goal: Discharge to home or other facility with appropriate resources  Outcome: Progressing  Flowsheets (Taken 2/9/2023 2131)  Discharge to home or other facility with appropriate resources:   Identify barriers to discharge with patient and caregiver   Arrange for needed discharge resources and transportation as appropriate   Identify discharge learning needs (meds, wound care, etc)     Problem: Pain  Goal: Verbalizes/displays adequate comfort level or baseline comfort level  Outcome: Progressing  Flowsheets (Taken 2/9/2023 2131)  Verbalizes/displays adequate comfort level or baseline comfort level:   Encourage patient to monitor pain and request assistance   Assess pain using appropriate pain scale   Administer analgesics based on type and severity of pain and evaluate response   Implement non-pharmacological measures as appropriate and evaluate response     Problem: ABCDS Injury Assessment  Goal: Absence of physical injury  Outcome: Progressing  Flowsheets (Taken 2/9/2023 2131)  Absence of Physical Injury: Implement safety measures based on patient assessment     Problem: Skin/Tissue Integrity  Goal: Absence of new skin breakdown  Description: 1. Monitor for areas of redness and/or skin breakdown  2. Assess vascular access sites hourly  3. Every 4-6 hours minimum:  Change oxygen saturation probe site  4. Every 4-6 hours:  If on nasal continuous positive airway pressure, respiratory therapy assess nares and determine need for appliance change or resting period. Outcome: Progressing  Note: No new skin breakdown noted this shift. Care plan reviewed with patient . Patient  verbalize understanding of the plan of care and contribute to goal setting.

## 2023-02-10 NOTE — PROGRESS NOTES
[facility]  INPATIENT PHYSICAL THERAPY  DAILY NOTE  [facility] ORTHOPEDICS 7K - 7K-05/005-A  Time In: 0940  Time Out: 1006  Timed Code Treatment Minutes: 26 Minutes  Minutes: 26          Date: 2/10/2023  Patient Name: Barrett Pham,  Gender:  female        MRN: [id number]  : 1980  (43 y.o.)     Referring Practitioner: Darcie Segura PA-C  Diagnosis: Lumbar radiculopathy  Additional Pertinent Hx: Pt is s/p LUMBAR DISCECTOMY OF L5-S1 WITH CONCURRENT POSTERIOR LUMBAR DECOMPRESSION AND INSTRUMENTED FUSION OF L5-S1 WITH INTERBODY IMPLANTS completed by Dr. Montana Jackson on . Prior Level of Function:  Lives With: Daughter, Spouse  Type of Home: Apartment  Home Layout: One level  Home Access: Stairs to enter without rails  Entrance Stairs - Number of Steps: 2 +1, has to complete a curb  Home Equipment: [place], standard, Crutches, Grab bars   Bathroom Shower/Tub: Tub/Shower unit  Bathroom Equipment: Tub transfer bench    Receives Help From: Family  ADL Assistance: Independent  Homemaking Assistance: Independent  Ambulation Assistance: Independent  Transfer Assistance: Independent  Active : Yes  Additional Comments: IND and active prior, no use of an AD    Restrictions/Precautions:  Restrictions/Precautions: General Precautions, Fall Risk, Surgical Protocols  Required Braces or Orthoses  Spinal: Lumbar Corset  Spinal Other: Per Alejandra Wiley via perfect serve, pt ok for abdominal binder until brace arrives  Position Activity Restriction  Spinal Precautions: No Bending, No Lifting, No Twisting  Other position/activity restrictions: drain removed 2/10     SUBJECTIVE: Ok to see pt per nursing. Pt sitting on side of bed when PT arrived, just got angelia with OT, however, agreeable to amb and complete mobility before getting back in bed. Reports she feels better than previous session and moving around more. Instructed pt to amb during the day with staff.      PAIN: 6/10: back    Vitals: Vitals not assessed per clinical judgement, see nursing flowsheet    OBJECTIVE:  Bed Mobility:  Sit to Supine: Stand By Assistance, X 1, with head of bed raised, with rail, with verbal cues    Scooting: Stand By Assistance, X 1, with head of bed raised, with rail  Able to complete log roll technique, increased time to get comfortable in bed  Transfers:  Sit to Stand: Contact Guard Assistance, X 1, cues for hand placement, with verbal cues  Stand to Sit:Contact Guard Assistance, X 1, cues for hand placement, with verbal cues  RW for support, slow transitions due to increased pain, completed from various surfaces and heights during session  Ambulation:  Stand By Assistance, [employer] Assistance, X 1, with cues for safety, with verbal cues , with increased time for completion  Distance: 150 feet x2  Surface: Level Tile  Device:Rolling Walker  Gait Deviations:  Slow Marcela, Decreased Step Length Bilaterally, and Decreased Gait Speed  Slow pace, no LOB noted, improved step pattern with improved distances  Balance:  Static Sitting Balance:  Supervision  Dynamic Sitting Balance: Stand By Assistance  Static Standing Balance: Stand By Assistance, Contact Guard Assistance  Pt doffed brace prior to getting back in bed. Pt required RW for support once in standing  Stairs:  Contact Guard Assistance, X 1, with cues for safety, with verbal cues , with increased time for completion  Number of Steps: 1 x2 trials, platform step   Height: 6\" step with Rolling Walker  Cues for proper technique with good demo, step to patter, slow pace and min A for RW to place with descent. Recommendations for family to be present with pt for completion. Functional Outcome Measures: Completed  AM-PAC Inpatient Mobility Raw Score : 18  AM-PAC Inpatient T-Scale Score : 43.63    ASSESSMENT:  Assessment: Patient progressing toward established goals. Activity Tolerance:  Patient tolerance of  treatment: good.  Increased pain     Equipment Recommendations:Equipment Needed: Yes (depending on d/c plans)  Mobility Devices: [place]: Rolling  Discharge Recommendations: Home with Home Health PT  Plan: Current Treatment Recommendations: Strengthening, Balance training, Functional mobility training, Transfer training, Endurance training, Gait training, Neuromuscular re-education, Stair training, Return to work related activity, Equipment evaluation, education, & procurement, Home exercise program, Safety education & training, Patient/Caregiver education & training, Therapeutic activities  General Plan:  (6x O)    Patient Education  Patient Education: Plan of Care, Precautions/Restrictions, [employer] Mobility, Equipment Education, Transfers, Gait, Stairs, Use of Gait Belt, Verbal Exercise Instruction, Home Safety Education,  - Patient Verbalized Understanding, - Patient Requires Continued Education    Goals:  Patient Goals : decrease pain, improve mobility, and return home  Short Term Goals  Time Frame for Short Term Goals: by discharge  Short Term Goal 1: Pt will demo sit to /from stand transfers with RW and SBA for safety to progress with mobility. Short Term Goal 2: Pt will demo bed mobility tasks with good demo of log roll technique with S to progress with mobility. Short Term Goal 3: Pt will amb for 50 feet with RW with SBA to progress with mobility. Short Term Goal 4: Pt will negotiate steps with CGA for safety with LRAD to progress with mobility. Short Term Goal 5: Pt will demo SBA for car transfer with good technique to progress with mobility. Long Term Goals  Time Frame for Long Term Goals : NA due to short ELOS    Following session, patient left in safe position with all fall risk precautions in place. Pt in bed following session, all needs and call light in reach, alarm on.

## 2023-02-10 NOTE — CARE COORDINATION
2/10/23, 1:41 PM EST    DISCHARGE ON GOING EVALUATION    [address] day: 2  Location: -05/005-A Reason for admit: Lumbar radiculopathy [M54.16]  Herniated nucleus pulposus of lumbosacral region [M51.27]   Procedure:    2/8 LUMBAR DISCECTOMY OF L5-S1 WITH CONCURRENT POSTERIOR LUMBAR DECOMPRESSION AND INSTRUMENTED FUSION OF L5-S1 WITH INTERBODY IMPLANTS  2/10 lumbar drain removed by Alfredo BUTCHER  Barriers to Discharge: POD 2, needs BM, aggressive bowel protocol. Pain control. Patient Goals/Plan/Treatment Preferences: home with ; requested RW and order placed. Planning home Saturday. 2/10/23, 1:43 PM EST    Patient goals/plan/ treatment preferences discussed by  and . Patient goals/plan/ treatment preferences reviewed with patient/ family. Patient/ family verbalize understanding of discharge plan and are in agreement with goal/plan/treatment preferences. Understanding was demonstrated using the teach back method. AVS provided by RN at time of discharge, which includes all necessary medical information pertaining to the patients current course of illness, treatment, post-discharge goals of care, and treatment preferences.      Services At/After Discharge: None

## 2023-02-10 NOTE — PROGRESS NOTES
Michaela Mendez was evaluated today for a wheeled walker and she requires this to successfully complete daily living tasks of grooming, bathing, toileting and ambulation. A wheeled walker is necessary due to the patient's unsteady gait, upper & lower body weakness, and inability to  an ambulation device; and she can ambulate only by pushing a walker instead of a lesser assistive device such as a cane, crutch, or standard walker. The need for this equipment was discussed with the patient and she understands and is in agreement.

## 2023-02-11 VITALS
HEIGHT: 62 IN | OXYGEN SATURATION: 98 % | HEART RATE: 82 BPM | RESPIRATION RATE: 16 BRPM | TEMPERATURE: 97.9 F | SYSTOLIC BLOOD PRESSURE: 121 MMHG | DIASTOLIC BLOOD PRESSURE: 70 MMHG | WEIGHT: 180.4 LBS | BODY MASS INDEX: 33.2 KG/M2

## 2023-02-11 PROCEDURE — APPSS60 APP SPLIT SHARED TIME 46-60 MINUTES: Performed by: PHYSICIAN ASSISTANT

## 2023-02-11 PROCEDURE — 97110 THERAPEUTIC EXERCISES: CPT

## 2023-02-11 PROCEDURE — 6360000002 HC RX W HCPCS: Performed by: PHYSICIAN ASSISTANT

## 2023-02-11 PROCEDURE — 2580000003 HC RX 258: Performed by: PHYSICIAN ASSISTANT

## 2023-02-11 PROCEDURE — 97530 THERAPEUTIC ACTIVITIES: CPT

## 2023-02-11 PROCEDURE — 97116 GAIT TRAINING THERAPY: CPT

## 2023-02-11 PROCEDURE — 6370000000 HC RX 637 (ALT 250 FOR IP): Performed by: PHYSICIAN ASSISTANT

## 2023-02-11 RX ORDER — CYCLOBENZAPRINE HCL 10 MG
10 TABLET ORAL 3 TIMES DAILY PRN
Qty: 30 TABLET | Refills: 0 | Status: SHIPPED | OUTPATIENT
Start: 2023-02-11 | End: 2023-02-21

## 2023-02-11 RX ORDER — HYDROCODONE BITARTRATE AND ACETAMINOPHEN 5; 325 MG/1; MG/1
1 TABLET ORAL EVERY 6 HOURS PRN
Qty: 28 TABLET | Refills: 0 | Status: SHIPPED | OUTPATIENT
Start: 2023-02-11 | End: 2023-02-18

## 2023-02-11 RX ADMIN — HYDROCODONE BITARTRATE AND ACETAMINOPHEN 1 TABLET: 5; 325 TABLET ORAL at 18:25

## 2023-02-11 RX ADMIN — VENLAFAXINE HYDROCHLORIDE 75 MG: 75 CAPSULE, EXTENDED RELEASE ORAL at 09:15

## 2023-02-11 RX ADMIN — Medication 2000 MG: at 09:13

## 2023-02-11 RX ADMIN — METOPROLOL SUCCINATE 25 MG: 25 TABLET, EXTENDED RELEASE ORAL at 09:17

## 2023-02-11 RX ADMIN — PANTOPRAZOLE SODIUM 40 MG: 40 TABLET, DELAYED RELEASE ORAL at 09:07

## 2023-02-11 RX ADMIN — HYDROCODONE BITARTRATE AND ACETAMINOPHEN 1 TABLET: 5; 325 TABLET ORAL at 04:38

## 2023-02-11 RX ADMIN — DOCUSATE SODIUM 50 MG AND SENNOSIDES 8.6 MG 2 TABLET: 8.6; 5 TABLET, FILM COATED ORAL at 09:07

## 2023-02-11 RX ADMIN — DOCUSATE SODIUM 100 MG: 100 CAPSULE, LIQUID FILLED ORAL at 09:07

## 2023-02-11 RX ADMIN — POLYETHYLENE GLYCOL 3350 17 G: 17 POWDER, FOR SOLUTION ORAL at 09:07

## 2023-02-11 RX ADMIN — SODIUM CHLORIDE, PRESERVATIVE FREE 10 ML: 5 INJECTION INTRAVENOUS at 09:18

## 2023-02-11 RX ADMIN — MUPIROCIN: 20 OINTMENT TOPICAL at 09:17

## 2023-02-11 RX ADMIN — VENLAFAXINE HYDROCHLORIDE 150 MG: 150 CAPSULE, EXTENDED RELEASE ORAL at 09:15

## 2023-02-11 RX ADMIN — BUSPIRONE HYDROCHLORIDE 5 MG: 5 TABLET ORAL at 09:15

## 2023-02-11 ASSESSMENT — PAIN DESCRIPTION - ORIENTATION
ORIENTATION: MID
ORIENTATION: MID;LOWER

## 2023-02-11 ASSESSMENT — PAIN SCALES - GENERAL
PAINLEVEL_OUTOF10: 7
PAINLEVEL_OUTOF10: 8

## 2023-02-11 ASSESSMENT — PAIN DESCRIPTION - DESCRIPTORS
DESCRIPTORS: ACHING
DESCRIPTORS: ACHING;DISCOMFORT

## 2023-02-11 ASSESSMENT — PAIN - FUNCTIONAL ASSESSMENT: PAIN_FUNCTIONAL_ASSESSMENT: ACTIVITIES ARE NOT PREVENTED

## 2023-02-11 ASSESSMENT — PAIN DESCRIPTION - LOCATION
LOCATION: BACK
LOCATION: BACK

## 2023-02-11 NOTE — PROGRESS NOTES
Neurosurgery Progress Note    Patient:  Susanne Hemp      Unit/Bed:7K-05/005-A    YOB: 1980    MRN: [id number]     Acct: [de-identified]     Admit date: 2/8/2023    No chief complaint on file. Patient Seen, Chart, Physician notes, Labs, Radiology studies reviewed. Subjective: Patient is seen and evaluated on the floor postoperative day #3 from lumbar decompression and instrumented fusion with concurrent discectomy as performed by Dr. Lena Mart, without complication. Pain is well controlled with incisional site discomfort improved. Past, Family, Social History unchanged from admission. Diet:  ADULT DIET; Regular  ADULT ORAL NUTRITION SUPPLEMENT; Breakfast, Lunch, Dinner; Other Oral Supplement; Activia Yogurt with breakfast; Hot beverage with all meals    Medications:  Scheduled Meds:   docusate sodium  100 mg Oral BID    polyethylene glycol  17 g Oral Daily    metoprolol succinate  25 mg Oral Daily    venlafaxine  150 mg Oral Daily    venlafaxine  75 mg Oral Daily    busPIRone  5 mg Oral Daily    mupirocin   Nasal Daily    pantoprazole  40 mg Oral QAM AC    sodium chloride flush  5-40 mL IntraVENous 2 times per day     Continuous Infusions:   sodium chloride       PRN Meds:sennosides-docusate sodium, bisacodyl, potassium chloride **OR** potassium alternative oral replacement **OR** potassium chloride, albuterol sulfate HFA, albuterol, SUMAtriptan, sodium chloride flush, sodium chloride, morphine **OR** morphine, HYDROcodone 5 mg - acetaminophen, cyclobenzaprine    Objective: Patient was observed sitting up in a chair and transitioning from chair to bed with assistance from nursing utilizing a rolling four-point walker. Dressings were changed, yesterday,over flat dry incision following removal of the surgical drain performed at bedside, without complication.   She continues with purposeful movement x4 obeys commands and is realizing improvement for strength and sensation for the bilateral lower extremities    Vitals: /73   Pulse 81   Temp 98.1 °F (36.7 °C) (Oral)   Resp 16   Ht 5' 2\" (1.575 m)   Wt 180 lb 6.4 oz (81.8 kg)   LMP 01/27/2023   SpO2 99%   BMI 33.00 kg/m²     Physical Exam     Physical Exam:  Alert and attentive. Language appropriate, with no aphasia. Pupils equal.  Facial strength symmetric. Review of Systems     Constitutional:  Positive for activity change. Respiratory:  Negative for apnea, chest tightness and shortness of breath. Cardiovascular:  Negative for chest pain and leg swelling. Gastrointestinal:  Positive for abdominal pain. Negative for abdominal distention. Musculoskeletal:  Positive for back pain. Neurological:  Positive for weakness. Negative for dizziness, numbness and headaches. Psychiatric/Behavioral:  Negative for agitation, behavioral problems, confusion and decreased concentration. 24 hour intake/output:  Intake/Output Summary (Last 24 hours) at 2/11/2023 1033  Last data filed at 2/11/2023 0438  Gross per 24 hour   Intake 860 ml   Output 3 ml   Net 857 ml     Last 3 weights: Wt Readings from Last 3 Encounters:   02/08/23 180 lb 6.4 oz (81.8 kg)   02/02/23 186 lb (84.4 kg)   02/01/23 186 lb (84.4 kg)         CBC:   Recent Labs     02/08/23 2043 02/09/23  0616 02/10/23  0658   WBC 10.1 9.3 6.8   HGB 11.2* 10.8* 10.5*    202 156     BMP:    Recent Labs     02/08/23 2043 02/09/23  0616 02/10/23  0658    144 141   K 3.6 3.4* 3.6    110 108   CO2 24 24 23   BUN 5* 6* 6*   CREATININE 0.8 0.7 0.6   GLUCOSE 141* 110* 96     Calcium:  Recent Labs     02/10/23  0658   CALCIUM 8.0*     Magnesium:  Recent Labs     02/09/23  0616   MG 2.1     Glucose:No results for input(s): POCGLU in the last 72 hours. HgbA1C: No results for input(s): LABA1C in the last 72 hours. Lipids: No results for input(s): CHOL, TRIG, HDL, LDL, LDLCALC in the last 72 hours.     Radiology reports as per the Radiologist  Radiology: XR LUMBAR SPINE (2-3 VIEWS)    Result Date: 2/8/2023  PROCEDURE: XR LUMBAR SPINE (2-3 VIEWS) CLINICAL INFORMATION: surgery, COMPARISON: MRI lumbar spine dated 12/22/2022. TECHNIQUE: AP and lateral views of the lumbosacral junction     FINDINGS/IMPRESSION: There are laminectomies at L5 and S1 with interbody fusion/spacer device at L5-S1 and bilateral pedicle screws at L5 and S1 with interconnecting rods. The right L5 pedicle screw appears to be medialized. There is no evidence of hardware failure. Please refer to the operative report for further information. **This report has been created using voice recognition software. It may contain minor errors which are inherent in voice recognition technology. ** Final report electronically signed by Dr. Jm Matos MD on 2/8/2023 2:54 PM    FLUORO FOR SURGICAL PROCEDURES    Result Date: 2/8/2023  Radiology exam is complete. No Radiologist dictation. Please follow up with ordering provider. Assessment: Status postoperative day 3 from lumbar decompression and instrumented fusion with concurrent discectomy as performed by Dr. Ceci Cruz, without complication    Principal Problem:    Lumbar radiculopathy  Active Problems:    Hypokalemia    Elective surgery  Resolved Problems:    * No resolved hospital problems. *        Plan: The patient is seen and evaluated on the floor with evaluation exam findings reviewed and discussed with Dr. Ceci Cruz and with nursing. Patient is observed ambulating with assistance from nursing. Dressings were changed over a flat dry incision following removal of the surgical drain, yesterday, in anticipation of a discharge home today with self-care. She is ambulating with assistance with lumbar LSO brace and voiding freely. An aggressive bowel protocol was put into place yesterday to address constipation.         Electronically signed by Remi Brown PA-C on 2/11/2023 at 10:33 AM    Neurosurgery

## 2023-02-11 NOTE — DISCHARGE INSTRUCTIONS
Keep incision sites clean and dry with dressing changes every 2 days or as needed moving forward. Take pain medications and muscle relaxants as needed. May remove dressings and shower post discharge day 2. Limit lifting bending or twisting to extremes x4 weeks    A follow-up with neurosurgery in 10 days for suture removal as scheduled.

## 2023-02-11 NOTE — PLAN OF CARE
Problem: Discharge Planning  Goal: Discharge to home or other facility with appropriate resources  2/11/2023 1059 by Kwaku Phillips RN  Outcome: Progressing  Flowsheets (Taken 2/11/2023 1059)  Discharge to home or other facility with appropriate resources:   Identify barriers to discharge with patient and caregiver   Arrange for needed discharge resources and transportation as appropriate   Identify discharge learning needs (meds, wound care, etc)   Refer to discharge planning if patient needs post-hospital services based on physician order or complex needs related to functional status, cognitive ability or social support system  Note: Plans to go home at discharge. Problem: Pain  Goal: Verbalizes/displays adequate comfort level or baseline comfort level  2/11/2023 1059 by Kwaku Phillips RN  Outcome: Progressing  Flowsheets (Taken 2/11/2023 1059)  Verbalizes/displays adequate comfort level or baseline comfort level:   Encourage patient to monitor pain and request assistance   Assess pain using appropriate pain scale   Administer analgesics based on type and severity of pain and evaluate response   Implement non-pharmacological measures as appropriate and evaluate response   Notify Licensed Independent Practitioner if interventions unsuccessful or patient reports new pain     Problem: ABCDS Injury Assessment  Goal: Absence of physical injury  2/11/2023 1059 by Kwaku Phillips RN  Outcome: Progressing  Problem: Skin/Tissue Integrity  Goal: Absence of new skin breakdown  Description: 1. Monitor for areas of redness and/or skin breakdown  2. Assess vascular access sites hourly  3. Every 4-6 hours minimum:  Change oxygen saturation probe site  4. Every 4-6 hours:  If on nasal continuous positive airway pressure, respiratory therapy assess nares and determine need for appliance change or resting period.   2/11/2023 1059 by Kwaku Phillips RN  Outcome: Progressing  Note: Assess skin Q 4 hours and prn     Problem: Safety - Adult  Goal: Free from fall injury  Outcome: Progressing  Note: Pt using call light appropriately to call for assistance with ambulation to the bathroom and to chair. Pt is also compliant with use of non-skid slippers. Pt reports understanding of fall prevention when discussed. Absence of Physical Injury: Implement safety measures based on patient assessment   Care plan reviewed with patient . Patient  verbalize understanding of the plan of care and contribute to goal setting.

## 2023-02-11 NOTE — PROGRESS NOTES
[address] [address]  INPATIENT PHYSICAL THERAPY  DAILY NOTE  [facility] ORTHOPEDICS 7K - 7K-05/005-A      Time In: 0415  Time Out: 4546  Timed Code Treatment Minutes: 24 Minutes  Minutes: 24          Date: 2023  Patient Name: Keny Givens,  Gender:  female        MRN: [id number]  : 1980  (43 y.o.)     Referring Practitioner: Annette Tomas PA-C  Diagnosis: Lumbar radiculopathy  Additional Pertinent Hx: Pt is s/p LUMBAR DISCECTOMY OF L5-S1 WITH CONCURRENT POSTERIOR LUMBAR DECOMPRESSION AND INSTRUMENTED FUSION OF L5-S1 WITH INTERBODY IMPLANTS completed by Dr. Oscar Ellis on . Prior Level of Function:  Lives With: Daughter, Spouse  Type of Home: Apartment  Home Layout: One level  Home Access: Stairs to enter without rails  Entrance Stairs - Number of Steps: 2 +1, has to complete a curb  Home Equipment: Ollis Mano, standard, Crutches, Grab bars   Bathroom Shower/Tub: Tub/Shower unit  Bathroom Equipment: Tub transfer bench    Receives Help From: Family  ADL Assistance: Independent  Homemaking Assistance: Independent  Ambulation Assistance: Independent  Transfer Assistance: Independent  Active :  Yes  Additional Comments: IND and active prior, no use of an AD    Restrictions/Precautions:  Restrictions/Precautions: General Precautions, Fall Risk, Surgical Protocols  Required Braces or Orthoses  Spinal: Lumbar Corset  Spinal Other: lumbar corset  Position Activity Restriction  Spinal Precautions: No Bending, No Lifting, No Twisting  Other position/activity restrictions: drain removed 2/10       SUBJECTIVE: pt in bed on arrival and hasn't been up for a walk yet today she was agreeable to get up and asked to use bathroom     PAIN: back pain and burning pain in buttocks and LEs pt reported numbness in left foot since sx     Vitals: Vitals not assessed per clinical judgement, see nursing flowsheet    OBJECTIVE:  Bed Mobility:  Rolling to Right: Stand By Assistance, with head of bed flat, with increased time for completion   Supine to Sit: Stand By Assistance, with head of bed flat, with increased time for completion  Scooting: Stand By Assistance    Transfers:  Sit to Stand: Stand By Assistance  Stand to Sit:Stand By Assistance  - educated pt how to get in/out of SUV and to maintain back precautions   Ambulation:  Stand By Assistance  Distance: 200x1  Surface: Level Tile  Device:Rolling Walker  Gait Deviations:  Slow Marcela noted heavy reliance on the walker cues given to relax at her shoulders noted good step length and heel strike   Stairs:  Stand By Assistance  Number of Steps: 1  Height: 6\" step with Rolling Walker    Balance:  Standing balance to complete balance task in bathroom w/ one to no UE at support with SBA- pt had questions about her low toilet at home did give her different options as insurance will not purchase a commode for her     Exercise:  None- encouraged walking multiple times a day at discharge   Functional Outcome Measures: Completed  AM-PAC Inpatient Mobility Raw Score : 18  AM-PAC Inpatient T-Scale Score : 43.63    ASSESSMENT:  Assessment: Patient progressing toward established goals. Activity Tolerance:  Patient tolerance of  treatment: good.         Equipment Recommendations:Equipment Needed: Yes (depending on d/c plans)  Mobility Devices: [employer]ile [employer]ow: Rolling  Discharge Recommendations: Home with Assist as Needed  Plan: Current Treatment Recommendations: Strengthening, Balance training, Functional mobility training, Transfer training, Endurance training, Gait training, Neuromuscular re-education, Stair training, Return to work related activity, Equipment evaluation, education, & procurement, Home exercise program, Safety education & training, Patient/Caregiver education & training, Therapeutic activities  General Plan:  (6x O)    Patient Education  Patient Education: Plan of Care    Goals:  Patient Goals : decrease pain, improve mobility, and return home  Short Term Goals  Time Frame for Short Term Goals: by discharge  Short Term Goal 1: Pt will demo sit to /from stand transfers with RW and SBA for safety to progress with mobility. Short Term Goal 2: Pt will demo bed mobility tasks with good demo of log roll technique with S to progress with mobility. Short Term Goal 3: Pt will amb for 50 feet with RW with SBA to progress with mobility. Short Term Goal 4: Pt will negotiate steps with CGA for safety with LRAD to progress with mobility. Short Term Goal 5: Pt will demo SBA for car transfer with good technique to progress with mobility. Long Term Goals  Time Frame for Long Term Goals : NA due to short ELOS    Following session, patient left in safe position with all fall risk precautions in place.

## 2023-02-11 NOTE — PROGRESS NOTES
[address] [facility]  Occupational Therapy  Daily Note  Time:   Time In: 5241  Time Out: 1455  Timed Code Treatment Minutes: 10 Minutes  Minutes: 10          Date: 2023  Patient Name: Tati Van,   Gender: female      Room: [facility]005-A  MRN: [id number]  : 1980  (43 y.o.)  Referring Practitioner: Holly Patiño PA-C  Diagnosis: lumbar radiculopathy  Additional Pertinent Hx: Tati Van is a 43 y.o.  female with a medical history significant for asthma, depression, irregular heart rate, and headaches (migraine) with a surgical history significant for injection therapies provided by pain management the left knee surgery. She presents with ongoing low back pain with radiation to the knees bilaterally with occasional transient radiation to the feet. She also relates occasional urinary frequency and imbalance. She treats with pain management where she has received bilateral lumbar and medial branch blocks targeting facet joints at L4-5 and L5-S1. Pt underwent lumbar decompression instrumented fusion with concurrent discectomy. Restrictions/Precautions:  Restrictions/Precautions: General Precautions, Fall Risk, Surgical Protocols  Required Braces or Orthoses  Spinal: Lumbar Corset  Spinal Other: lumbar corset  Position Activity Restriction  Spinal Precautions: No Bending, No Lifting, No Twisting  Other position/activity restrictions: drain removed 2/10     SUBJECTIVE: Nurse approved Tx, pt walking back from bathroom with nurse upon arrival.     PAIN: reports pain /10:     Vitals: Vitals not assessed per clinical judgement, see nursing flowsheet    COGNITION: WFL    ADL:   No ADL's completed this session. Allena Closs BALANCE:  Sitting Balance:  Stand By Assistance. EOB 0 LOB good sitting balance     BED MOBILITY:  Sit to Supine: Supervision EOB raised, adhere to back precaution     TRANSFERS:  Stand to Sit: Contact Guard Assistance.       FUNCTIONAL MOBILITY:  Assistive Device: Rolling Walker  Assist Level:  Contact Guard Assistance. Distance: To and from bathroom  0 LOB      ADDITIONAL ACTIVITIES:  Completed BUE ex's with orange theraband 1x15 ea at the shoulder and elbow to increase UB strength for self care tasks and transfers. ASSESSMENT:     Activity Tolerance:  Patient tolerance of  treatment: good. Discharge Recommendations: Continue to assess pending progress  Equipment Recommendations: Equipment Needed: Yes  Other: TBD  Plan: Times Per Week: 6x  Times Per Day: Once a day  Current Treatment Recommendations: Strengthening, Balance training, Functional mobility training, Endurance training, Neuromuscular re-education, Safety education & training, Patient/Caregiver education & training, Positioning, Self-Care / ADL, Home management training, Equipment evaluation, education, & procurement    Patient Education  Patient Education: Role of OT, Plan of Care, Home Exercise Program, and Precautions     Goals  Short Term Goals  Time Frame for Short Term Goals: until discharge  Short Term Goal 1: Pt will safely navigate to/from bathroom and short HH distances with Mod I improve safe access to ADLs. Short Term Goal 2: Pt will tolerate x8 minute standing with 1-2 UE release at Mod I to improve independence with sinkside I/ADLs. Short Term Goal 3: Pt will perform LB dressing/bathing following spinal precautions with Mod I and AE as needed to improve indep with BADL routine. Short Term Goal 4: Pt will retrieve 5 items from various surface heights with CGA maintaining spinal precautions without cues to improve indep with ADLs and IADLs. Following session, patient left in safe position with all fall risk precautions in place.

## 2023-02-11 NOTE — PLAN OF CARE
Problem: Discharge Planning  Goal: Discharge to home or other facility with appropriate resources  2/11/2023 0015 by Danis Painter RN  Outcome: Progressing  Flowsheets  Taken 2/11/2023 0015  Discharge to home or other facility with appropriate resources:   Identify barriers to discharge with patient and caregiver   Arrange for needed discharge resources and transportation as appropriate   Identify discharge learning needs (meds, wound care, etc)  Taken 2/10/2023 2100  Discharge to home or other facility with appropriate resources: Identify barriers to discharge with patient and caregiver     Problem: Pain  Goal: Verbalizes/displays adequate comfort level or baseline comfort level  2/11/2023 0015 by Danis Painter RN  Outcome: Progressing  Flowsheets (Taken 2/11/2023 0015)  Verbalizes/displays adequate comfort level or baseline comfort level:   Encourage patient to monitor pain and request assistance   Assess pain using appropriate pain scale   Administer analgesics based on type and severity of pain and evaluate response   Implement non-pharmacological measures as appropriate and evaluate response   Consider cultural and social influences on pain and pain management   Notify Licensed Independent Practitioner if interventions unsuccessful or patient reports new pain     Problem: ABCDS Injury Assessment  Goal: Absence of physical injury  2/11/2023 0015 by Danis Painter RN  Outcome: Progressing  Flowsheets (Taken 2/11/2023 0015)  Absence of Physical Injury: Implement safety measures based on patient assessment     Problem: Skin/Tissue Integrity  Goal: Absence of new skin breakdown  Description: 1. Monitor for areas of redness and/or skin breakdown  2. Assess vascular access sites hourly  3. Every 4-6 hours minimum:  Change oxygen saturation probe site  4.   Every 4-6 hours:  If on nasal continuous positive airway pressure, respiratory therapy assess nares and determine need for appliance change or resting period. 2/11/2023 0015 by Sathish Ambrosio RN  Outcome: Progressing  Note: No evidence of new skin breakdown. Pt. [place] and repositions self. Care plan reviewed with patient. Patient verbalize understanding of the plan of care and contribute to goal setting.

## 2023-02-11 NOTE — DISCHARGE SUMMARY
Physician Discharge Summary     Patient ID:  Rossana Jhaveri  [id number]  40 y.o.  1980    Admit date: 2/8/2023    Discharge date and time: No discharge date for patient encounter. Admitting Physician: Tawanda Kay MD     Discharge Physician: Tawanda Kay MD     Admission Diagnoses: Lumbar radiculopathy [M54.16]  Herniated nucleus pulposus of lumbosacral region [M51.27]    Discharge Diagnoses: Post lumbar decompression and instrumented fusion with concurrent discectomy. Admission Condition: good    Discharged Condition: good    Indication for Admission: Scheduled surgical intervention with Dr. Radha Modi for lumbar decompression and instrumented fusion with concurrent discectomy    Hospital Course: Patient required a 3 night stay following a procedure to address radiating low back pain with weakness and numbness. She tolerated the lumbar decompression and instrumented fusion with concurrent discectomy as performed by Dr. Radha Modi, without complications. She has been ambulating and voiding freely with dressings changed over flat dry incision in anticipation of a discharge home with self-care for today.   Follow-up with neurosurgery as scheduled at 12 days postoperative for suture removal    Consults: none    Significant Diagnostic Studies: None    Treatments: surgery: Posterior lumbar decompression and instrumented fusion with concurrent discectomy    Discharge Exam:  /73   Pulse 81   Temp 98.1 °F (36.7 °C) (Oral)   Resp 16   Ht 5' 2\" (1.575 m)   Wt 180 lb 6.4 oz (81.8 kg)   LMP 01/27/2023   SpO2 99%   BMI 33.00 kg/m²     General Appearance:    Alert, cooperative, no distress, appears stated age   Head:    Normocephalic, without obvious abnormality, atraumatic   Eyes:    PERRL, conjunctiva/corneas clear, EOM's intact, fundi     benign, both eyes   Ears:    Normal TM's and external ear canals, both ears   Nose:   Nares normal, septum midline, mucosa normal, no drainage or sinus tenderness   Throat:   Lips, mucosa, and tongue normal; teeth and gums normal   Neck:   Supple, symmetrical, trachea midline, no adenopathy;     thyroid:  no enlargement/tenderness/nodules; no carotid    bruit or JVD   Back:     Symmetric, no curvature, ROM normal, no CVA tenderness   Lungs:     Clear to auscultation bilaterally, respirations unlabored   Chest Wall:    No tenderness or deformity    Heart:    Regular rate and rhythm, S1 and S2 normal, no murmur, rub   or gallop   Breast Exam:    No tenderness, masses, or nipple abnormality   Abdomen:     Soft, non-tender, bowel sounds active all four quadrants,     no masses, no organomegaly   Genitalia:    Normal female without lesion, discharge or tenderness   Rectal:    Normal tone ;guaiac negative stool   Extremities:   Extremities normal, atraumatic, no cyanosis or edema   Pulses:   2+ and symmetric all extremities   Skin:   Skin color, texture, turgor normal, no rashes or lesions   Lymph nodes:   Cervical, supraclavicular, and axillary nodes normal   Neurologic:   CNII-XII intact, normal strength, sensation and reflexes     throughout       Disposition: home    In process/preliminary results:  Outstanding Order Results       No orders found from 1/10/2023 to 2/9/2023. Patient Instructions:   Current Discharge Medication List        START taking these medications    Details   HYDROcodone-acetaminophen (NORCO) 5-325 MG per tablet Take 1 tablet by mouth every 6 hours as needed for Pain for up to 7 days.  Max Daily Amount: 4 tablets  Qty: 28 tablet, Refills: 0    Comments: Reduce doses taken as pain becomes manageable  Associated Diagnoses: Elective surgery      cyclobenzaprine (FLEXERIL) 10 MG tablet Take 1 tablet by mouth 3 times daily as needed for Muscle spasms  Qty: 30 tablet, Refills: 0           CONTINUE these medications which have NOT CHANGED    Details   amoxicillin-clavulanate (AUGMENTIN) 875-125 MG per tablet Take 1 tablet by mouth in the morning and at bedtime      rizatriptan (MAXALT) 5 MG tablet Take 1 tablet by mouth daily as needed for Migraine      mupirocin (BACTROBAN) 2 % ointment Take by nasal route 2 times daily for 5 days prior to surgery. Qty: 22 g, Refills: 0      !! venlafaxine (EFFEXOR XR) 150 MG extended release capsule Take 150 mg by mouth daily      !! venlafaxine (EFFEXOR XR) 75 MG extended release capsule Take 75 mg by mouth daily      busPIRone (BUSPAR) 5 MG tablet Take 5 mg by mouth daily      omeprazole (PRILOSEC) 40 MG delayed release capsule Take 40 mg by mouth daily      metoprolol succinate (TOPROL XL) 25 MG extended release tablet Take 1 tablet by mouth daily  Qty: 90 tablet, Refills: 3      albuterol (PROVENTIL) (2.5 MG/3ML) 0.083% nebulizer solution Take 2.5 mg by nebulization every 6 hours as needed for Wheezing      aspirin 81 MG EC tablet Take 1 tablet by mouth every 6 hours as needed for Pain  Qty: 30 tablet      SUMAtriptan (IMITREX) 50 MG tablet Take 100 mg by mouth once      albuterol sulfate  (90 Base) MCG/ACT inhaler Inhale 2 puffs into the lungs every 6 hours as needed for Wheezing      ibuprofen (ADVIL;MOTRIN) 800 MG tablet Take 800 mg by mouth every 6 hours as needed for Pain       !! - Potential duplicate medications found. Please discuss with provider. STOP taking these medications       acetaminophen (TYLENOL) 500 MG tablet Comments:   Reason for Stopping:             Activity: activity as tolerated  Diet: regular diet  Wound Care: keep wound clean and dry    Follow-up with neurosurgery in 10 days.     Signed:  [facility]  2/11/2023  10:42 AM

## 2023-02-12 NOTE — PROGRESS NOTES
Discharge instructions and medications and follow up appointments discussed with Jim Carlos. She verbalized understanding of instructions.  Discharged to car with belongings and AVS.

## 2023-02-20 ENCOUNTER — OFFICE VISIT (OUTPATIENT)
Dept: NEUROSURGERY | Age: 43
End: 2023-02-20
Payer: COMMERCIAL

## 2023-02-20 VITALS
DIASTOLIC BLOOD PRESSURE: 70 MMHG | SYSTOLIC BLOOD PRESSURE: 112 MMHG | BODY MASS INDEX: 33.13 KG/M2 | WEIGHT: 180 LBS | HEIGHT: 62 IN

## 2023-02-20 DIAGNOSIS — Z98.1 STATUS POST LUMBAR SPINAL FUSION: Primary | ICD-10-CM

## 2023-02-20 PROCEDURE — 1036F TOBACCO NON-USER: CPT | Performed by: PHYSICIAN ASSISTANT

## 2023-02-20 PROCEDURE — 1111F DSCHRG MED/CURRENT MED MERGE: CPT | Performed by: PHYSICIAN ASSISTANT

## 2023-02-20 PROCEDURE — 99204 OFFICE O/P NEW MOD 45 MIN: CPT | Performed by: PHYSICIAN ASSISTANT

## 2023-02-20 PROCEDURE — G8417 CALC BMI ABV UP PARAM F/U: HCPCS | Performed by: PHYSICIAN ASSISTANT

## 2023-02-20 PROCEDURE — G8484 FLU IMMUNIZE NO ADMIN: HCPCS | Performed by: PHYSICIAN ASSISTANT

## 2023-02-20 PROCEDURE — G8427 DOCREV CUR MEDS BY ELIG CLIN: HCPCS | Performed by: PHYSICIAN ASSISTANT

## 2023-02-23 ENCOUNTER — OFFICE VISIT (OUTPATIENT)
Dept: UROLOGY | Age: 43
End: 2023-02-23
Payer: COMMERCIAL

## 2023-02-23 VITALS — RESPIRATION RATE: 18 BRPM | BODY MASS INDEX: 34.6 KG/M2 | HEIGHT: 62 IN | WEIGHT: 188 LBS

## 2023-02-23 DIAGNOSIS — N39.3 STRESS INCONTINENCE: Primary | ICD-10-CM

## 2023-02-23 LAB
BILIRUBIN URINE: NEGATIVE
BLOOD URINE, POC: NEGATIVE
CHARACTER, URINE: CLEAR
COLOR, URINE: YELLOW
GLUCOSE URINE: NEGATIVE MG/DL
KETONES, URINE: NEGATIVE
LEUKOCYTE CLUMPS, URINE: NEGATIVE
NITRITE, URINE: NEGATIVE
PH, URINE: 5.5 (ref 5–9)
POST VOID RESIDUAL (PVR): 37 ML
PROTEIN, URINE: NEGATIVE MG/DL
SPECIFIC GRAVITY, URINE: 1.02 (ref 1–1.03)
UROBILINOGEN, URINE: 0.2 EU/DL (ref 0–1)

## 2023-02-23 PROCEDURE — G8417 CALC BMI ABV UP PARAM F/U: HCPCS | Performed by: UROLOGY

## 2023-02-23 PROCEDURE — G8427 DOCREV CUR MEDS BY ELIG CLIN: HCPCS | Performed by: UROLOGY

## 2023-02-23 PROCEDURE — G8484 FLU IMMUNIZE NO ADMIN: HCPCS | Performed by: UROLOGY

## 2023-02-23 PROCEDURE — 1036F TOBACCO NON-USER: CPT | Performed by: UROLOGY

## 2023-02-23 PROCEDURE — 51798 US URINE CAPACITY MEASURE: CPT | Performed by: UROLOGY

## 2023-02-23 PROCEDURE — 99204 OFFICE O/P NEW MOD 45 MIN: CPT | Performed by: UROLOGY

## 2023-02-23 PROCEDURE — 81003 URINALYSIS AUTO W/O SCOPE: CPT | Performed by: UROLOGY

## 2023-02-23 PROCEDURE — 1111F DSCHRG MED/CURRENT MED MERGE: CPT | Performed by: UROLOGY

## 2023-02-23 NOTE — PROGRESS NOTES
Chadwick Ortiz MD.    [address] [place] [address]  Dept: [phone]  Dept Fax: [phone]  Loc: [address] [facility] Urology Office Note -     Patient:  Lyndsay Danielle  YOB: 1980    The patient is a 43 y.o. female who presents today for evaluation of the following problems: incontinence  Chief Complaint   Patient presents with    New Patient    Incontinence    referred/consultation requested by Pasha Wilder. History of Present Illness:      Stress incontinence  Referred by neurosurgery for incontinence. Hx of back pain with syrinx and herniated disk sp fusion of L5-S1 2/8/23. Leaking urine for 1-2 years. Mainly with laughing, coughing, sneezing. Some urgency.    Denies dysuria, fever, chills, flank pain, kidney stones, hematuria.   4 vaginal deliveries    Post void residual 37mL  Negative UA: no signs of UTI          Requested/reviewed records from [facility] office and/or outside [de-identified]    (Patient's old records have been requested, reviewed and pertinent findings summarized in today's note.)    Procedures Today: N/A      Last several PSA's:  No results found for: PSA    Last total testosterone:  No results found for: TESTOSTERONE    Urinalysis today:  Results for POC orders placed in visit on 02/23/23   POCT Urinalysis No Micro (Auto)   Result Value Ref Range    Glucose, Ur Negative NEGATIVE mg/dl    Bilirubin Urine Negative     Ketones, Urine Negative NEGATIVE    Specific Gravity, Urine 1.025 1.002 - 1.030    Blood, UA POC Negative NEGATIVE    pH, Urine 5.50 5.0 - 9.0    Protein, Urine Negative NEGATIVE mg/dl    Urobilinogen, Urine 0.20 0.0 - 1.0 eu/dl    Nitrite, Urine Negative NEGATIVE    Leukocyte Clumps, Urine Negative NEGATIVE    Color, Urine Yellow YELLOW-STRAW    Character, Urine Clear [id number].[id number]   poct post void residual   Result Value Ref Range    post void residual 37 ml Last BUN and creatinine:  Lab Results   Component Value Date    BUN 6 (L) 02/10/2023     Lab Results   Component Value Date    CREATININE 0.6 02/10/2023         Imaging Reviewed during this Office Visit:   Thor Lewis MD independently reviewed the images and verified the radiology reports from:    XR LUMBAR SPINE (2-3 VIEWS)    Result Date: 2/8/2023  PROCEDURE: XR LUMBAR SPINE (2-3 VIEWS) CLINICAL INFORMATION: surgery, COMPARISON: MRI lumbar spine dated 12/22/2022. TECHNIQUE: AP and lateral views of the lumbosacral junction     FINDINGS/IMPRESSION: There are laminectomies at L5 and S1 with interbody fusion/spacer device at L5-S1 and bilateral pedicle screws at L5 and S1 with interconnecting rods. The right L5 pedicle screw appears to be medialized. There is no evidence of hardware failure. Please refer to the operative report for further information. **This report has been created using voice recognition software. It may contain minor errors which are inherent in voice recognition technology. ** Final report electronically signed by Dr. Jil Bowman MD on 2/8/2023 2:54 PM    FLUORO FOR SURGICAL PROCEDURES    Result Date: 2/8/2023  Radiology exam is complete. No Radiologist dictation. Please follow up with ordering provider.        PAST MEDICAL, FAMILY AND SOCIAL HISTORY:  Past Medical History:   Diagnosis Date    Asthma     Depression     Irregular heart rate     Migraines      Past Surgical History:   Procedure Laterality Date    BACK INJECTION Bilateral 12/08/2022    bilateral sacroiliac joint injection performed by Lyndon Alexandre DO at [address]      times 4    KNEE SURGERY Left     times 2    LUMBAR FUSION N/A 2/8/2023    LUMBAR DISCECTOMY OF L5-S1 WITH CONCURRENT POSTERIOR LUMBAR DECOMPRESSION AND INSTRUMENTED FUSION OF L5-S1 WITH INTERBODY IMPLANTS performed by Tian Figueroa MD at [address] [facility] Bilateral 01/12/2023    Lumbar 4/5, 5/Sacral 1 medial branch blocks bilateral performed by Lazaro Fernandez DO at [address]       Family History   Problem Relation Age of Onset    No Known Problems Mother     No Known Problems Father      Outpatient Medications Marked as Taking for the 2/23/23 encounter (Office Visit) with Tamara Gomes MD   Medication Sig Dispense Refill    traMADol-acetaminophen (ULTRACET) 37.5-325 MG per tablet Take 1 tablet by mouth every 6 hours as needed for Pain.       Cyclobenzaprine HCl (FLEXERIL PO) Take by mouth      amoxicillin-clavulanate (AUGMENTIN) 875-125 MG per tablet Take 1 tablet by mouth in the morning and at bedtime      rizatriptan (MAXALT) 5 MG tablet Take 1 tablet by mouth daily as needed for Migraine      venlafaxine (EFFEXOR XR) 150 MG extended release capsule Take 150 mg by mouth daily      venlafaxine (EFFEXOR XR) 75 MG extended release capsule Take 75 mg by mouth daily      busPIRone (BUSPAR) 5 MG tablet Take 5 mg by mouth daily      omeprazole (PRILOSEC) 40 MG delayed release capsule Take 40 mg by mouth daily      metoprolol succinate (TOPROL XL) 25 MG extended release tablet Take 1 tablet by mouth daily 90 tablet 3    albuterol (PROVENTIL) (2.5 MG/3ML) 0.083% nebulizer solution Take 2.5 mg by nebulization every 6 hours as needed for Wheezing      aspirin 81 MG EC tablet Take 1 tablet by mouth every 6 hours as needed for Pain 30 tablet     SUMAtriptan (IMITREX) 50 MG tablet Take 100 mg by mouth once      albuterol sulfate  (90 Base) MCG/ACT inhaler Inhale 2 puffs into the lungs every 6 hours as needed for Wheezing      ibuprofen (ADVIL;MOTRIN) 800 MG tablet Take 800 mg by mouth every 6 hours as needed for Pain         Methylprednisolone  Social History     Tobacco Use   Smoking Status Never   Smokeless Tobacco Never      (If patient a smoker, smoking cessation counseling offered)   Social History     Substance and Sexual Activity   Alcohol Use Never    Comment: rarely REVIEW OF SYSTEMS:  Constitutional: negative  Eyes: negative  Respiratory: negative  Cardiovascular: negative  Gastrointestinal: negative  Genitourinary: see HPI  Musculoskeletal: negative  Skin: negative   Neurological: negative  Hematological/Lymphatic: negative  Psychological: negative      Physical Exam:    This a 43 y.o. female  Vitals:    02/23/23 1141   Resp: 18     Body mass index is 34.39 kg/m². Constitutional: Patient in no acute distress; Has back brace    Assessment and Plan        1. Stress incontinence                 Plan:      Mostly stress incontinence- hold off on urodynamics  Kegel exercises  Fluid restriction prior to bed time. Cystoscopy with pelvic exam in 6-8 weeks for AGUSTÍN eval      Prescriptions Ordered:  No orders of the defined types were placed in this encounter.      Orders Placed:  Orders Placed This Encounter   Procedures    POCT Urinalysis No Micro (Auto)    poct post void residual     Bladder scan            MITCHEL Cardona MD

## 2023-03-02 ENCOUNTER — OFFICE VISIT (OUTPATIENT)
Dept: CARDIOLOGY CLINIC | Age: 43
End: 2023-03-02
Payer: COMMERCIAL

## 2023-03-02 VITALS
BODY MASS INDEX: 34.48 KG/M2 | SYSTOLIC BLOOD PRESSURE: 127 MMHG | HEART RATE: 81 BPM | DIASTOLIC BLOOD PRESSURE: 88 MMHG | HEIGHT: 62 IN | WEIGHT: 187.4 LBS

## 2023-03-02 DIAGNOSIS — R42 DIZZINESS: ICD-10-CM

## 2023-03-02 DIAGNOSIS — R00.2 INTERMITTENT PALPITATIONS: Primary | ICD-10-CM

## 2023-03-02 DIAGNOSIS — Z87.898 HISTORY OF SYNCOPE: ICD-10-CM

## 2023-03-02 PROCEDURE — G8417 CALC BMI ABV UP PARAM F/U: HCPCS | Performed by: INTERNAL MEDICINE

## 2023-03-02 PROCEDURE — 93000 ELECTROCARDIOGRAM COMPLETE: CPT | Performed by: INTERNAL MEDICINE

## 2023-03-02 PROCEDURE — G8427 DOCREV CUR MEDS BY ELIG CLIN: HCPCS | Performed by: INTERNAL MEDICINE

## 2023-03-02 PROCEDURE — 99214 OFFICE O/P EST MOD 30 MIN: CPT | Performed by: INTERNAL MEDICINE

## 2023-03-02 PROCEDURE — 1036F TOBACCO NON-USER: CPT | Performed by: INTERNAL MEDICINE

## 2023-03-02 PROCEDURE — G8484 FLU IMMUNIZE NO ADMIN: HCPCS | Performed by: INTERNAL MEDICINE

## 2023-03-02 PROCEDURE — 1111F DSCHRG MED/CURRENT MED MERGE: CPT | Performed by: INTERNAL MEDICINE

## 2023-03-02 RX ORDER — TRAMADOL HYDROCHLORIDE 50 MG/1
50 TABLET ORAL EVERY 6 HOURS PRN
COMMUNITY

## 2023-03-02 RX ORDER — HYDROCODONE BITARTRATE AND ACETAMINOPHEN 5; 325 MG/1; MG/1
1 TABLET ORAL EVERY 6 HOURS PRN
COMMUNITY

## 2023-03-02 NOTE — PROGRESS NOTES
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       Denied chest pain, sob,  dizziness or edema    Occasional palpitations    Better fater better hydration    No more dizziness  No more vertigo    Hx of Dizziness - room spinning- worse with change of head position , sitting supine or standing  Noted 2 days back  And none after and none for a while before  Previous hx of vertigo    Hx of chronic atypical cp  And noncardiac  Getting better and less frequent,       Hx of Pyrosis and help with tums    Hx of non-psotural dizziness and resolved  Hx of Dizziness-in supine , sitting or standing  Hx of vertigo- resolved    No syncope since 2.5 years    nevere smoked    FHX  Parent and sisters okay  None in grand parents      Patient Active Problem List   Diagnosis    Dizziness/ Vertigo    Intermittent palpitations    History of syncope    Chest wall pain    BPPV (benign paroxysmal positional vertigo)    Lumbar radiculopathy    Hypokalemia    Elective surgery       Past Surgical History:   Procedure Laterality Date    BACK INJECTION Bilateral 12/08/2022    bilateral sacroiliac joint injection performed by Caity Catalan DO at [address]      times 4    KNEE SURGERY Left     times 2    LUMBAR FUSION N/A 2/8/2023    LUMBAR DISCECTOMY OF L5-S1 WITH CONCURRENT POSTERIOR LUMBAR DECOMPRESSION AND INSTRUMENTED FUSION OF L5-S1 WITH INTERBODY IMPLANTS performed by Cherylene Reus, MD at [address] [place] [address] 01/12/2023    Lumbar 4/5, 5/Sacral 1 medial branch blocks bilateral performed by Artem Ellington DO at [address]         Allergies   Allergen Reactions    Methylprednisolone Palpitations     jitters        Family History   Problem Relation Age of Onset    No Known Problems Mother     No Known Problems Father         Social History     Socioeconomic History    Marital status:      Spouse name: Not on file    Number of children: Not on file    Years of education: Not on file    Highest education level: Not on file   Occupational History    Not on file   Tobacco Use    Smoking status: Never    Smokeless tobacco: Never   Vaping Use    Vaping Use: Never used   Substance and Sexual Activity    Alcohol use: Never     Comment: rarely    Drug use: Never    Sexual activity: Not on file   Other Topics Concern    Not on file   Social History Narrative    Not on file     Social Determinants of Health     Financial Resource Strain: Not on file   Food Insecurity: Not on file   Transportation Needs: Not on file   Physical Activity: Not on file   Stress: Not on file   Social Connections: Not on file   Intimate Partner Violence: Not on file   Housing Stability: Not on file       Current Outpatient Medications   Medication Sig Dispense Refill    HYDROcodone-acetaminophen (NORCO) 5-325 MG per tablet Take 1 tablet by mouth every 6 hours as needed for Pain.      traMADol (ULTRAM) 50 MG tablet Take 50 mg by mouth every 6 hours as needed for Pain.      traMADol-acetaminophen (ULTRACET) 37.5-325 MG per tablet Take 1 tablet by mouth every 6 hours as needed for Pain.       Cyclobenzaprine HCl (FLEXERIL PO) Take by mouth      amoxicillin-clavulanate (AUGMENTIN) 875-125 MG per tablet Take 1 tablet by mouth in the morning and at bedtime      rizatriptan (MAXALT) 5 MG tablet Take 1 tablet by mouth daily as needed for Migraine      venlafaxine (EFFEXOR XR) 150 MG extended release capsule Take 150 mg by mouth daily      venlafaxine (EFFEXOR XR) 75 MG extended release capsule Take 75 mg by mouth daily      busPIRone (BUSPAR) 5 MG tablet Take 5 mg by mouth daily      omeprazole (PRILOSEC) 40 MG delayed release capsule Take 40 mg by mouth daily      metoprolol succinate (TOPROL XL) 25 MG extended release tablet Take 1 tablet by mouth daily 90 tablet 3    albuterol (PROVENTIL) (2.5 MG/3ML) 0.083% nebulizer solution Take 2.5 mg by nebulization every 6 hours as needed for Wheezing      aspirin 81 MG EC tablet Take 1 tablet by mouth every 6 hours as needed for Pain 30 tablet     SUMAtriptan (IMITREX) 50 MG tablet Take 100 mg by mouth once      albuterol sulfate  (90 Base) MCG/ACT inhaler Inhale 2 puffs into the lungs every 6 hours as needed for Wheezing      ibuprofen (ADVIL;MOTRIN) 800 MG tablet Take 800 mg by mouth every 6 hours as needed for Pain       No current facility-administered medications for this visit. Review of Systems -     General ROS: negative  Psychological ROS: negative  Hematological and Lymphatic ROS: No history of blood clots or bleeding disorder. Respiratory ROS: no cough,  or wheezing, the rest see HPI  Cardiovascular ROS: See HPI  Gastrointestinal ROS: negative  Genito-Urinary ROS: no dysuria, trouble voiding, or hematuria  Musculoskeletal ROS: negative  Neurological ROS: no TIA or stroke symptoms  Dermatological ROS: negative      Blood pressure 127/88, pulse 81, height 5' 2\" (1.575 m), weight 187 lb 6.4 oz (85 kg).         Physical Examination:    General appearance - alert, well appearing, and in no distress  HEENT- Pink conjunctiva  , Non-icteri sclera,PERRLA  Mental status - alert, oriented to person, place, and time  Neck - supple, no significant adenopathy, no JVD, or carotid bruits  Chest - clear to auscultation, no wheezes, rales or rhonchi, symmetric air entry  Heart - normal rate, regular rhythm, normal S1, S2, no murmurs, rubs, clicks or gallops  Abdomen - soft, nontender, nondistended, no masses or organomegaly  JOSE- no CVA or flank tenderness, no suprapubic tenderness  Neurological - alert, oriented, normal speech, no focal findings or movement disorder noted  Musculoskeletal/limbs - no joint tenderness, deformity or swelling   - peripheral pulses normal, no pedal edema, no clubbing or cyanosis  Skin - normal coloration and turgor, no rashes, no suspicious skin lesions noted  Psych- appropriate mood and affect    Lab  No results for input(s): CKTOTAL, CKMB, CKMBINDEX, TROPONINI in the last 72 hours. CBC:   Lab Results   Component Value Date/Time    WBC 6.8 02/10/2023 06:58 AM    RBC 3.32 02/10/2023 06:58 AM    HGB 10.5 02/10/2023 06:58 AM    HCT 31.4 02/10/2023 06:58 AM    MCV 94.6 02/10/2023 06:58 AM    MCH 31.6 02/10/2023 06:58 AM    MCHC 33.4 02/10/2023 06:58 AM     02/10/2023 06:58 AM    MPV 9.3 02/10/2023 06:58 AM     BMP:    Lab Results   Component Value Date/Time     02/10/2023 06:58 AM    K 3.6 02/10/2023 06:58 AM     02/10/2023 06:58 AM    CO2 23 02/10/2023 06:58 AM    BUN 6 02/10/2023 06:58 AM    CREATININE 0.6 02/10/2023 06:58 AM    CALCIUM 8.0 02/10/2023 06:58 AM    LABGLOM >60 02/10/2023 06:58 AM    GLUCOSE 96 02/10/2023 06:58 AM     Hepatic Function Panel:  No results found for: ALKPHOS, ALT, AST, PROT, BILITOT, BILIDIR, IBILI, LABALBU  Magnesium:    Lab Results   Component Value Date/Time    MG 2.1 02/09/2023 06:16 AM     Warfarin PT/INR:  No components found for: PTPATWAR, PTINRWAR  HgBA1c:  No results found for: LABA1C  FLP:  No results found for: TRIG, HDL, LDLCALC, LDLDIRECT, LABVLDL  TSH:  No results found for: TSH      Stress echo  Conclusions    Summary   No chest pain   No stress Induced Arrhythmia   Good exercise capacity with METS of 10   Exercise time was 8 :30 minutes   Exercise EKG is negative for ischemia   no stress-induced segmental wall motion abnormality.    Proper augmentation of the left ventricular with exercise   no stress induced cavity dilation. Exercise stress echo is not suggestive for cardiac ischemia   Appropriate hemodynamic response to exercise. Signature    ----------------------------------------------------------------   Electronically signed by Tyree Martínez MD (Interpreting   physician) on 06/19/2019 at 08:42 PM   ----------------------------------------------------------------      Conclusions    Summary   Normal left ventricle size and Low Normal systolic function. Ejection   fraction was estimated at 50 %. There were no regional left ventricular   wall motion abnormalities and wall thickness was within normal limits. Signature    ----------------------------------------------------------------   Electronically signed by Tyree Martínez MD (Interpreting   physician) on 06/19/2019 at 08:39 PM      EKG 5/7/19  NSR, no acute abn    CONCLUSION:  1. Sinus rhythm. 2.  Episodes of palpitation and racing sensation, which correlated with  sinus tachycardia and sometimes with occasional ectopy. Otherwise, no  sustained rhythm disturbance. Bentley Salazar M.D.   D: 06/07/2019 14:42:17     ekg  10/31/19     Sinus  Rhythm   WITHIN NORMAL LIMITS    ekg 11/4/2020     Sinus  Rhythm   WITHIN NORMAL LIMITS    ekg 3/17/2021  Sinus  Rhythm   WITHIN NORMAL LIMITS        Conclusions    Summary   Exercise capacity:Poor for her age   Patient was having limiting SOB. Patient did have chest pain at the peak the exercise at the 6 th min of   exercise aching 8/10 and resolved 4 min in to recovery. Pat had chest pain   for total of 5 min since chest pain started. Exercise EKG stress test is not suggestive for ischemia. Recommendation   Clinical correlation is recommended.     Signatures    ----------------------------------------------------------------   Electronically signed by Tyree Martínez MD (Performing   Physician) on 04/14/2021 at 18:47    Ekg 3/9/22  Sinus  Rhythm   WITHIN NORMAL LIMITS    EKG 3/2/23  Sinus  Rhythm WITHIN NORMAL LIMITS      Assessment     Diagnosis Orders   1. Intermittent palpitations  EKG 12 Lead      2. History of syncope        3. HX OF Dizziness/ Vertigo                Plan   The  current meds and labs reviewed    Home  to [address]  Her usual BP low in office than at homw    Hx of Syncope 2019  Dizziness supine and standing- non-postural  BPP Vertigo- room spinning  No Hx of siezure  Hydration    Echo and stress echo WNL-2019  TTT- negative  Event Monitor 3 weeks- episodes sinus tachy    Bedside orthostatic check- No orthostatic drop  [place]seo salt intake  Cont  Getorade and hydration    Palpitation-intermittent- better on troprol xl 12.5  Cont  toprol xl 25 po qhs     PAT  Stable and doing better    D/w the pat the plan of care     D/W THE PAT ABOUT bp ISSUE    Discussed use, benefit, and side effects of prescribed medications. All patient questions answered. Pt voiced understanding. Instructed to continue current medications, diet and exercise. Continue risk factor modification and medical management. Patient agreed with treatment plan. Follow up as directed.     RTC In  12 months    [facility]

## 2023-03-14 ENCOUNTER — OFFICE VISIT (OUTPATIENT)
Dept: PHYSICAL MEDICINE AND REHAB | Age: 43
End: 2023-03-14
Payer: COMMERCIAL

## 2023-03-14 VITALS
DIASTOLIC BLOOD PRESSURE: 80 MMHG | WEIGHT: 187 LBS | HEIGHT: 62 IN | BODY MASS INDEX: 34.41 KG/M2 | SYSTOLIC BLOOD PRESSURE: 126 MMHG

## 2023-03-14 DIAGNOSIS — G95.0 SYRINX OF SPINAL CORD (HCC): ICD-10-CM

## 2023-03-14 DIAGNOSIS — M47.819 FACET ARTHROPATHY: ICD-10-CM

## 2023-03-14 DIAGNOSIS — G89.4 CHRONIC PAIN SYNDROME: ICD-10-CM

## 2023-03-14 DIAGNOSIS — Z98.890 HISTORY OF BACK SURGERY: ICD-10-CM

## 2023-03-14 DIAGNOSIS — M47.816 LUMBAR SPONDYLOSIS: Primary | ICD-10-CM

## 2023-03-14 PROCEDURE — G8427 DOCREV CUR MEDS BY ELIG CLIN: HCPCS | Performed by: NURSE PRACTITIONER

## 2023-03-14 PROCEDURE — G8417 CALC BMI ABV UP PARAM F/U: HCPCS | Performed by: NURSE PRACTITIONER

## 2023-03-14 PROCEDURE — 1036F TOBACCO NON-USER: CPT | Performed by: NURSE PRACTITIONER

## 2023-03-14 PROCEDURE — 99213 OFFICE O/P EST LOW 20 MIN: CPT | Performed by: NURSE PRACTITIONER

## 2023-03-14 PROCEDURE — G8484 FLU IMMUNIZE NO ADMIN: HCPCS | Performed by: NURSE PRACTITIONER

## 2023-03-14 NOTE — PROGRESS NOTES
Chronic Pain/PM&R Clinic Note     Encounter Date: 3/14/23    Subjective:   Chief Complaint:   Chief Complaint   Patient presents with    Back Pain     Low back          History of Present Illness:   Joss Borja is a 43 y.o. female seen in the clinic initially on 11/10/2022 upon request from [place], [place]  for her history of low back pain. Patient states her pain started about one year ago without any inciting event. She states she does work in [employer] which has required her to lift 40-60 pounds at one time. She wonders if this repetitive work has lead to her current issues. She states her pain has been getting gradually worse. Pain is aggravated by sitting, walking or standing for any amount of time. She states she has not found anything to help with her pain. She does get some pain radiating into the posterior aspect of bilateral thighs. She denies history of falls. She does not use an assistive device for ambulation. She does not notice trouble with balance but does feels like she has tripped over her own feet more recently. She states she has not been sleeping well due to not being able to gt comfortable. She pursued physical therapy at [facility] a couple months ago. Physical therapy aggravated her pain significantly. She does feel like she has weakness in her legs, the left greater than her right. She states she cannot lift her legs due to pain in her back. She denies any saddle anesthesia or bowel/bladder incontinence. Of note, she had a thoracic MRI yesterday and is slated to see Marielena Botello with neurosurgery on 12/01/2022 to review and discuss potential surgical intervention. Today, 03/14/2023, patient presents for planned follow up on chronic low back pain. She underwent lumbar decompression and fusion of L5-S1 with Dr. Norris Matt on 02/08/2023 and has been doing phenomenal since then. She continues to follow with neurosurgery in regards to her post-op recovery.   Still has limitations with twisting and lifting and states she is not ready to go back to work at this point. Otherwise, she is doing very well. She denies any new focal leg weakness, leg paresthesias, saddle anesthesia, bowel/bladder incontinence. History of Interventions:   Surgery: No previous lumbar surgeries  Injections: B/L SI injection (12/08/2022) - moderate relief x one day  Lumbar MBB B/L L4/5 and L5/S1 (01/12/2023) - no relief    Current Treatment Medications:   Ibuprofen 800 mg - menstrual pain, no relief of low back pain  Flexeril     Historical Treatment Medications:   Tylenol - ineffective  Tizanidine ?   Alleve  Mobic - ineffective  Tramadol 50 mg TID PRN - ineffective  Norco 5-325 mg BID PRN - ineffective  Lyrica 75 mg BID - ineffective    Imaging:  Thoracic MRI (11/09/2022)      Past Medical History:   Diagnosis Date    Asthma     Depression     Irregular heart rate     Migraines        Past Surgical History:   Procedure Laterality Date    BACK INJECTION Bilateral 12/08/2022    bilateral sacroiliac joint injection performed by Schuyler Sever, DO at [address]      times 4    KNEE SURGERY Left     times 2    LUMBAR [facility] SURGERY  02/08/2023    with fusion    LUMBAR FUSION N/A 02/08/2023    LUMBAR DISCECTOMY OF L5-S1 WITH CONCURRENT POSTERIOR LUMBAR DECOMPRESSION AND INSTRUMENTED FUSION OF L5-S1 WITH INTERBODY IMPLANTS performed by Carlene Zhu MD at [address] Bilateral 01/12/2023    Lumbar 4/5, 5/Sacral 1 medial branch blocks bilateral performed by Schuyler Sever, DO at [address]         Family History   Problem Relation Age of Onset    No Known Problems Mother     No Known Problems Father        Medications & Allergies:   Current Outpatient Medications   Medication Instructions    albuterol (PROVENTIL) 2.5 mg, Nebulization, EVERY 6 HOURS PRN    albuterol sulfate  (90 Base) MCG/ACT inhaler 2 puffs, Inhalation, EVERY 6 HOURS PRN    amoxicillin-clavulanate (AUGMENTIN) 875-125 MG per tablet 1 tablet, Oral, 2 times daily    aspirin 81 mg, Oral, EVERY 6 HOURS PRN    busPIRone (BUSPAR) 5 mg, Oral, DAILY    Cyclobenzaprine HCl (FLEXERIL PO) 5 mg, Oral, 2 TIMES DAILY PRN    HYDROcodone-acetaminophen (NORCO) 5-325 MG per tablet 1 tablet, Oral, EVERY 6 HOURS PRN    ibuprofen (ADVIL;MOTRIN) 800 mg, Oral, EVERY 6 HOURS PRN    metoprolol succinate (TOPROL XL) 25 mg, Oral, DAILY    omeprazole (PRILOSEC) 40 mg, Oral, DAILY    rizatriptan (MAXALT) 5 MG tablet 1 tablet, Oral, DAILY PRN    SUMAtriptan (IMITREX) 100 mg, Oral, ONCE    traMADol (ULTRAM) 50 mg, Oral, EVERY 6 HOURS PRN    traMADol-acetaminophen (ULTRACET) 37.5-325 MG per tablet 1 tablet, Oral, EVERY 6 HOURS PRN    venlafaxine (EFFEXOR XR) 150 mg, Oral, DAILY    venlafaxine (EFFEXOR XR) 75 mg, Oral, DAILY       Allergies   Allergen Reactions    Methylprednisolone Palpitations     jitters       Review of Systems:   Constitutional: negative for weight changes or fevers  Genitourinary: negative for bowel/bladder incontinence   Musculoskeletal: positive low back pain  Neurological: negative for leg weakness and leg numbness/tingling  Behavioral/Psych: negative for anxiety/depression   All other systems reviewed and are negative    Objective:     Vitals:    03/14/23 1322   BP: 126/80       Constitutional: Pleasant, no acute distress   Head: Normocephalic, atraumatic   Eyes: Conjunctivae normal   Neck: Supple, symmetrical   Lungs: Normal respiratory effort, non-labored breathing   Cardiovascular: Limbs warm and well perfused   Abdomen: Non-protruded   Musculoskeletal: Muscle bulk symmetric, no atrophy, no gross deformities   · Lower Extremities: ROM WNL. · Thorax: No paraspinal tenderness bilaterally. No scoliosis or kyphosis. · Lumbar Spine: ROM limited. Lumbar paraspinals tender to palpation bilaterally. SLR neg bilaterally.    Neurological: Cranial nerves II-XII grossly intact. · Gait - Antalgic gait. Ambulates without assistive device. · Motor: 3/5 muscle strength in bilateral hip flexion, knee flexion, knee extension, ankle dorsiflexion, and ankle plantar flexion. Pain limited weakness. · Sensory: LT sensation intact in lower limbs   · Reflexes: 2+ symmetrical in bilateral achilles, 2+ bilateral patellar, negative ankle clonus, downgoing babinski   Skin: No rashes or lesions present   Psychological: Cooperative, no exaggerated pain behaviors     Assessment:    Diagnosis Orders   1. Lumbar spondylosis        2. Syrinx of spinal cord ([address]        3. Facet arthropathy        4. Chronic pain syndrome        5. History of back surgery          Dennis Flores is a 43 y. o.female presenting to the pain clinic for evaluation of low back pain. Overall, she is doing very well after her lumbar decompression and fusion. We will follow up PRN    Plan: The following treatment recommendations and plan were discussed in detail with Dennis Flores. Imaging:   I have reviewed patients imaging of lumbar and thoracic MRI and results were discussed with patient today. Analgesics:   Patient is taking Acetaminophen. Patient informed that the maximum amount of acetaminophen taken on a regular basis should only be 4000 mg per day. Patient is taking Mobic. Patient is advised to take as prescribed and not take on an empty stomach. Adjuvants:   None     Interventions:   None    Multidisciplinary Pain Management:   In the presence of complex, chronic, and multi-factorial pain, the importance of a multidisciplinary approach to pain management in the patients management regimen was emphasized and discussed in great detail. PHYSICAL THERAPY: Patient is advised to see a physical therapist for gentle stretching exercises and conditioning exercises for management of pain.    PSYCHOLOGY: Patient is advised to see a clinical pain psychologist, for the psychosocial aspect of pain care through coping skills, relaxation strategies, cognitive group therapy etc.   OBESITY: Patient is advised to seek nutrition consult to help in managing their weight to decrease its impact on pain.     Referrals:  None    Prescriptions Written This Visit:   None     Follow-up: ELIZABETH Wagner - SHAN[facility]

## 2023-03-23 ENCOUNTER — OFFICE VISIT (OUTPATIENT)
Dept: NEUROSURGERY | Age: 43
End: 2023-03-23
Payer: COMMERCIAL

## 2023-03-23 VITALS
SYSTOLIC BLOOD PRESSURE: 110 MMHG | BODY MASS INDEX: 34.41 KG/M2 | HEIGHT: 62 IN | WEIGHT: 187 LBS | DIASTOLIC BLOOD PRESSURE: 80 MMHG

## 2023-03-23 DIAGNOSIS — Z98.1 STATUS POST LUMBAR SPINAL FUSION: Primary | ICD-10-CM

## 2023-03-23 DIAGNOSIS — G95.0 SYRINX (HCC): ICD-10-CM

## 2023-03-23 PROCEDURE — G8417 CALC BMI ABV UP PARAM F/U: HCPCS | Performed by: PHYSICIAN ASSISTANT

## 2023-03-23 PROCEDURE — 99204 OFFICE O/P NEW MOD 45 MIN: CPT | Performed by: PHYSICIAN ASSISTANT

## 2023-03-23 PROCEDURE — 1036F TOBACCO NON-USER: CPT | Performed by: PHYSICIAN ASSISTANT

## 2023-03-23 PROCEDURE — G8427 DOCREV CUR MEDS BY ELIG CLIN: HCPCS | Performed by: PHYSICIAN ASSISTANT

## 2023-03-23 PROCEDURE — G8484 FLU IMMUNIZE NO ADMIN: HCPCS | Performed by: PHYSICIAN ASSISTANT

## 2023-03-23 NOTE — LETTER
March 23, 2023       Kendra Sainz YOB: 1980   [address]  [address] [id number] Date of Visit:  3/23/2023       To Whom It May Concern:    Malcolm Bell was seen in my clinic on 3/23/2023. She may return to work on 4/3/2023 with light duty limiting lifting to 10 lbs with a reassesment in 30 days. If you have any questions or concerns, please don't hesitate to call.     Sincerely,        Laura Andujar PA-C

## 2023-03-23 NOTE — PROGRESS NOTES
[address] [place] [address]  Dept: [phone]  Dept Fax: [phone]  Loc: [facility] Follow Visit  Visit Date: 3/23/2023      Lizbeth Lambert  is a 43 y.o. female who is returning to the office today for a post-op follow-up visit. She had surgery on 2/8/2023 with Dr. Laci Carlson where she underwent discectomy and concurrent lumbar fusion of T1-A9, without complication. Her office setting on 2/20/2023 where she presented for her initial postdischarge hospital follow-up from the procedure, principally procedure removal.  She arrived ambulating without assistance with LSO support brace intact and a well-healed incision warranting removal of surgical staples with application of Steri-Strips to the site and a follow-up scheduled for today to ascertain continued healing of the incision. On exam at her last visit she presented with some residual numbness and tingling for the lower extremities (improving). She arrives today accompanied by her  with LSO brace intact. Her incisions are well-healed and she relates continuing improvement for lower extremity strength and sensation. She is no longer taking prescriptive pain medications, treating what few symptoms remain with over-the-counter medications and exercises. She does relate some continued numbness involving the foot (left lower extremity). We discussed the recovery expectations and process noting that it can take 6 months to a year for some symptoms to improve or resolve. We reviewed imaging from the procedure and discussed activities moving forward. A return to work authorization note is processed at today's appointment for limited light duty including a lifting restriction of less than 15 pounds with a reassessment in 30 days.   She is encouraged to contact her office with any additional questions or concerns     Patient was evaluated today and

## 2023-04-18 ENCOUNTER — HOSPITAL ENCOUNTER (OUTPATIENT)
Dept: UROLOGY | Age: 43
Discharge: HOME | End: 2023-04-18
Payer: COMMERCIAL

## 2023-04-18 VITALS
RESPIRATION RATE: 18 BRPM | HEIGHT: 62 IN | BODY MASS INDEX: 34.3 KG/M2 | TEMPERATURE: 96.8 F | HEART RATE: 65 BPM | DIASTOLIC BLOOD PRESSURE: 89 MMHG | WEIGHT: 186.4 LBS | SYSTOLIC BLOOD PRESSURE: 156 MMHG

## 2023-04-18 LAB
BILIRUBIN URINE: NEGATIVE
BLOOD URINE, POC: ABNORMAL
CHARACTER, URINE: ABNORMAL
COLOR, URINE: YELLOW
GLUCOSE URINE: NEGATIVE MG/DL
KETONES, URINE: NEGATIVE
LEUKOCYTE CLUMPS, URINE: NEGATIVE
NITRITE, URINE: NEGATIVE
PH, URINE: 5.5 (ref 5–9)
PROTEIN, URINE: NEGATIVE MG/DL
SPECIFIC GRAVITY, URINE: >= 1.03 (ref 1–1.03)
UROBILINOGEN, URINE: 0.2 EU/DL (ref 0–1)

## 2023-04-18 PROCEDURE — 52000 CYSTOURETHROSCOPY: CPT

## 2023-04-18 NOTE — DISCHARGE INSTRUCTIONS
Discharge instructions: Cystoscopy  You May experience painful urination and see blood in the urine after your procedure. This should resolve over time. Pt ok to discharge home in good condition  No heavy lifting, >10 lbs for today  Pt should avoid strenuous activity for today  Pt should walk moderately at home  Pt ok to shower   Pt may resume diet as tolerated  Please call attending physician or hospital  with questions  Call or Present to ED if fever (> 101F), intractable nausea vomiting or pain.     Surgery scheduler will call to arrange bladder sling surgery

## 2023-04-18 NOTE — PROGRESS NOTES
[facility]   This procedure has been fully reviewed with the patient and written informed consent has been obtained. 1517 Procedure started with   1518  Procedure completed; patient tolerated well. Baron Garcia talked to patient in length about procedure findings. Patient discharged.     PLAN    Schedule cysto midurethral sling (30) gen

## 2023-04-18 NOTE — OP NOTE
Cystoscopy    Operative Note    Patient:  Jacobo Francis  MRN: [id number]  YOB: 1980    Date: 04/17/23  Surgeon: Pedro Huffman MD  Anesthesia: Feli Carton Local  Indications:     Mostly stress incontinence- hold off on urodynamics  Kegel exercises  Fluid restriction prior to bed time. Cystoscopy with pelvic exam in 6-8 weeks for AGUSTÍN eval      Position: Dorsal Lithotomy  EBL: 0 ml    Findings:   The patient was prepped and draped in the usual sterile fashion. The cystoscope was advanced through the urethra and into the bladder. The bladder was thoroughly inspected and the following was noted:    Vagina: normal appearing vagina with normal color and discharge, no lesions  Residual Urine: mild. Urine clear, with no obvious infection  Urethra: normal appearing urethra with no masses, tenderness or lesions urethral hypermobility with leak noted  Bladder: no tumor noted . no bladder diverticulum. Mild trabeculation noted. Ureters: Orifices with normal configuration and location. The cystoscope was removed. The patient tolerated the procedure well.   Severe AGUSTÍN- discussed mesh, r,b,a  Schedule cysto midurethral sling (30) gen

## 2023-04-19 ENCOUNTER — TELEPHONE (OUTPATIENT)
Dept: UROLOGY | Age: 43
End: 2023-04-19

## 2023-04-19 DIAGNOSIS — N39.3 STRESS INCONTINENCE: Primary | ICD-10-CM

## 2023-04-19 DIAGNOSIS — Z01.818 PRE-OP TESTING: ICD-10-CM

## 2023-04-20 NOTE — TELEPHONE ENCOUNTER
DO NOT TAKE FISH OIL, IBUPROFEN, MOTRIN-LIKE DRUGS AND ANY MULTIVITAMINS OR OVER THE COUNTER SUPPLEMENTS 14 DAYS PRIOR TO SURGERY. HOLD ASPIRIN FOR 5 DAYS PRIOR TO SURGERY. MUST HAVE AN ADULT OVER THE AGE OF 18 WITH YOU AT THE TIME OF THE PROCEDURE AND WITH YOU AT HOME AFTER THE PROCEDURE FOR 24 HOURS       Rand Espinoza Betito 1980 Diagnosis:     Surgical Physician: Dr. Aditi Mireles have been scheduled for the procedure marked below:      Surgery: Cystoscopy, Mid Urethral Sling Placement         Date: 6/1/2023     Anesthesia: Anesthesiologist (General/Spinal)     Place of Service: [address] [place] [address] Second Floor Same Day Surgery         Arrive to same day surgery by:  9:00am  (Surgery time is subject to change)      INSTRUCTIONS AS MARKED BELOW:    1.  DO NOT eat or drink anything after midnight before surgery. 2.  We prefer you shower or bathe with an antibacterial soap (Dial) the morning of surgery. 3  Please bring a current medication list, photo ID and insurance card(s) with you  4. Okay to take Tylenol  5. If you take Glucophage, Metformin or Janumet, hold 48-hours prior to surgery  6. Take blood pressure or heart medication as directed, if taken in the morning take with a small sip of water  7. The office will call you in 1-2 days after your procedure to schedule a follow up. DATE SENSITIVE TESTING-DO ON THE DATE LISTED*WALK IN * NO APPOINTMENT NEEDED    DO URINE CULTURE AND FASTING LABS ON 5/18/2023; ORDERS INCLUDED.         Date: 4/20/2023

## 2023-04-20 NOTE — TELEPHONE ENCOUNTER
Patient scheduled with Dr. Mike Andre on 6/1/2023. Surgery consent to be done upon arrival.  Patient to do urine culture and fasting labs on 5/18/2023; orders mailed to patient. Surgery instructions mailed to patient. Patient will have an adult over the age of 25 with them at discharge and 24 hours after procedure. Efe Maynard notified.

## 2023-04-20 NOTE — TELEPHONE ENCOUNTER
SURGERY [address]  [facility] [address] [facility] [address] [facility], [address]      Phone *[phone] *[phone]   Surgical Scheduling Direct Line Phone *[phone] Fax *[phone]      Nicholas Camarena 1980 female    [address]  [address]   Marital Status:          Home Phone: [phone]      Cell Phone:    Telephone Information:   Mobile [phone]          Surgeon: Dr. Basilia Tejada Surgery Date: 6/1/2023   Time: 11:00am    Procedure: Cystoscopy, Mid Urethral Sling Placement     Diagnosis: stress incontinence     Important Medical History:  In [facility]    Special Inst/Equip: LUISA Hernandez 1 [employer] notified    CPT Codes:    10589  Latex Allergy: No     Cardiac Device:  No    Anesthesia:  General          Admission Type:  Same Day                        Admit Prior to Day of Surgery: No    Case Location:  Main OR            Preadmission Testing:  Phone Call          PAT Date and Time:______________________________________________________    PAT Confirmation #: ______________________________________________________    Post Op Visit: ___________________________________________________________    Need Preop Cardiac Clearance: No    Does Patient have Cardiologist/physician?     none    Surgery Confirmation #: __________________________________________________    : ________________________   Date: __________________________     Insurance Company Name: [employer]

## 2023-05-03 ENCOUNTER — HOSPITAL ENCOUNTER (OUTPATIENT)
Dept: MRI IMAGING | Age: 43
Discharge: HOME | End: 2023-05-03
Payer: COMMERCIAL

## 2023-05-03 DIAGNOSIS — G95.0 SYRINX (HCC): ICD-10-CM

## 2023-05-03 PROCEDURE — 72157 MRI CHEST SPINE W/O & W/DYE: CPT

## 2023-05-03 PROCEDURE — A9579 GAD-BASE MR CONTRAST NOS,1ML: HCPCS | Performed by: PHYSICIAN ASSISTANT

## 2023-05-03 PROCEDURE — 6360000004 HC RX CONTRAST MEDICATION: Performed by: PHYSICIAN ASSISTANT

## 2023-05-03 PROCEDURE — 72156 MRI NECK SPINE W/O & W/DYE: CPT

## 2023-05-03 PROCEDURE — 72158 MRI LUMBAR SPINE W/O & W/DYE: CPT

## 2023-05-03 RX ADMIN — GADOTERIDOL 15 ML: 279.3 INJECTION, SOLUTION INTRAVENOUS at 11:56

## 2023-05-04 NOTE — PROGRESS NOTES
[address]  Dept: [phone]  Dept Fax: [phone]  Loc: [facility] 1160 Follow Visit  Visit Date: 2/20/2023      Elena Hsu  is a 43 y.o. female who is returning to the office today for a post-op follow-up visit. She had surgery on 2/8/2023 with Dr. Kae Arizmendi where she underwent discectomy and concurrent lumbar fusion of X2-Z4, without complication. She presents today for initial postdischarge hospital follow-up, principally for suture removal.  She arrives today for need and ambulating without assistance with LSO support brace. Incision is inspected and is well-healed enough to warrant removal of surgical staples with the application of Steri-Strips and instructions to maintain the Steri-Strips for 3 to 5 days removing any residual Steri-Strips after 7 days. She is encouraged to maintain weight restrictions of less than 10 pounds with a follow-up with our service in 4 weeks to ascertain progress towards healing of the incision and resolution of her prior symptom presentation. On exam she is stable and intact neurologically with some residual numbness and tingling involving the left lower extremity (improving). Patient was evaluated today and is doing well overall. No new complaints were voiced. Patient  lives alone  Wound: wound healing as expected  Follow-up Studies: No orders of the defined types were placed in this encounter. Assessment/Plan:  Status Post post discharge hospital follow-up from L5-S1 discectomy and fusion  Doing well overall  Encouraged gradual increase in physical and mental activity. Fall precaution and home safety education provided to patient. Follow-up: Surgical staples were removed with Steri-Strips applied to the incision sites and encouraged given to maintain the Steri-Strips for 3 to 5 days removing any residual Steri-Strips after 7 days.   A follow-up with our service in 4 weeks to ascertain continued healing of the incision site and continued progress towards resolution of prior symptom presentation is also scheduled.       Electronically signed by Mykel Garrido PA-C on 2/20/23 at 10:02 AM EST [Normal] : no joint swelling and grossly normal strength and tone

## 2023-05-18 LAB
BASOPHILS ABSOLUTE: 0 /ΜL
BASOPHILS RELATIVE PERCENT: 0.5 %
BUN BLDV-MCNC: 11 MG/DL
CALCIUM SERPL-MCNC: 9 MG/DL
CHLORIDE BLD-SCNC: 105 MMOL/L
CO2: 28 MMOL/L
CREAT SERPL-MCNC: 0.6 MG/DL
EGFR: NORMAL
EOSINOPHILS ABSOLUTE: 0.2 /ΜL
EOSINOPHILS RELATIVE PERCENT: 2.7 %
GLUCOSE BLD-MCNC: 115 MG/DL
HCT VFR BLD CALC: 39.2 % (ref 36–46)
HEMOGLOBIN: 13.3 G/DL (ref 12–16)
LYMPHOCYTES ABSOLUTE: 1.5 /ΜL
LYMPHOCYTES RELATIVE PERCENT: 19.6 %
MCH RBC QN AUTO: 29.9 PG
MCHC RBC AUTO-ENTMCNC: 33.9 G/DL
MCV RBC AUTO: 88.1 FL
MONOCYTES ABSOLUTE: 0.4 /ΜL
MONOCYTES RELATIVE PERCENT: 4.5 %
NEUTROPHILS ABSOLUTE: 5.5 /ΜL
NEUTROPHILS RELATIVE PERCENT: 72.1 %
PDW BLD-RTO: 13.2 %
PLATELET # BLD: 274 K/ΜL
PMV BLD AUTO: 10 FL
POTASSIUM SERPL-SCNC: 3.3 MMOL/L
RBC # BLD: 4.45 10^6/ΜL
SODIUM BLD-SCNC: 141 MMOL/L
WBC # BLD: 7.7 10^3/ML

## 2023-05-22 ENCOUNTER — PREP FOR PROCEDURE (OUTPATIENT)
Dept: UROLOGY | Age: 43
End: 2023-05-22

## 2023-05-22 ENCOUNTER — TELEPHONE (OUTPATIENT)
Dept: UROLOGY | Age: 43
End: 2023-05-22

## 2023-05-22 RX ORDER — CEPHALEXIN 500 MG/1
500 CAPSULE ORAL 3 TIMES DAILY
Qty: 21 CAPSULE | Refills: 0 | Status: SHIPPED | OUTPATIENT
Start: 2023-05-22 | End: 2023-05-29

## 2023-05-22 NOTE — TELEPHONE ENCOUNTER
----- Message from ELIZABETH Lion - CNP sent at 5/22/2023 11:23 AM EDT -----  Mixed growth  Keflex sent

## 2023-05-22 NOTE — TELEPHONE ENCOUNTER
Attempted to call the patient. Voicemail left to return the call to the office and to  the prescription from the pharmacy.

## 2023-05-23 NOTE — TELEPHONE ENCOUNTER
Patient returned call to the office, informed of results and voiced understanding to  prescription at the pharmacy

## 2023-05-24 RX ORDER — SODIUM CHLORIDE 0.9 % (FLUSH) 0.9 %
5-40 SYRINGE (ML) INJECTION EVERY 12 HOURS SCHEDULED
Status: CANCELLED | OUTPATIENT
Start: 2023-05-24

## 2023-05-24 RX ORDER — IPRATROPIUM BROMIDE AND ALBUTEROL SULFATE 2.5; .5 MG/3ML; MG/3ML
1 SOLUTION RESPIRATORY (INHALATION) EVERY 4 HOURS PRN
Status: CANCELLED | OUTPATIENT
Start: 2023-06-01

## 2023-05-24 RX ORDER — SODIUM CHLORIDE 9 MG/ML
INJECTION, SOLUTION INTRAVENOUS PRN
Status: CANCELLED | OUTPATIENT
Start: 2023-05-24

## 2023-05-24 RX ORDER — SODIUM CHLORIDE 0.9 % (FLUSH) 0.9 %
5-40 SYRINGE (ML) INJECTION PRN
Status: CANCELLED | OUTPATIENT
Start: 2023-05-24

## 2023-06-02 ENCOUNTER — ANESTHESIA EVENT (OUTPATIENT)
Dept: OPERATING ROOM | Age: 43
End: 2023-06-02
Payer: COMMERCIAL

## 2023-06-02 ENCOUNTER — ANESTHESIA (OUTPATIENT)
Dept: OPERATING ROOM | Age: 43
End: 2023-06-02
Payer: COMMERCIAL

## 2023-06-02 ENCOUNTER — HOSPITAL ENCOUNTER (OUTPATIENT)
Age: 43
Setting detail: OUTPATIENT SURGERY
Discharge: HOME | End: 2023-06-02
Attending: UROLOGY | Admitting: UROLOGY
Payer: COMMERCIAL

## 2023-06-02 VITALS
TEMPERATURE: 96.1 F | SYSTOLIC BLOOD PRESSURE: 122 MMHG | HEIGHT: 63 IN | HEART RATE: 66 BPM | DIASTOLIC BLOOD PRESSURE: 79 MMHG | OXYGEN SATURATION: 98 % | RESPIRATION RATE: 16 BRPM | WEIGHT: 181.4 LBS | BODY MASS INDEX: 32.14 KG/M2

## 2023-06-02 DIAGNOSIS — G89.18 POSTOPERATIVE PAIN: Primary | ICD-10-CM

## 2023-06-02 LAB
POTASSIUM SERPL-SCNC: 3.7 MEQ/L (ref 3.5–5.2)
PREGNANCY, URINE: NEGATIVE

## 2023-06-02 PROCEDURE — 7100000011 HC PHASE II RECOVERY - ADDTL 15 MIN: Performed by: UROLOGY

## 2023-06-02 PROCEDURE — 6360000002 HC RX W HCPCS: Performed by: UROLOGY

## 2023-06-02 PROCEDURE — C1771 REP DEV, URINARY, W/SLING: HCPCS | Performed by: UROLOGY

## 2023-06-02 PROCEDURE — 2580000003 HC RX 258: Performed by: UROLOGY

## 2023-06-02 PROCEDURE — 2709999900 HC NON-CHARGEABLE SUPPLY: Performed by: UROLOGY

## 2023-06-02 PROCEDURE — 6360000002 HC RX W HCPCS: Performed by: ANESTHESIOLOGY

## 2023-06-02 PROCEDURE — 3600000004 HC SURGERY LEVEL 4 BASE: Performed by: UROLOGY

## 2023-06-02 PROCEDURE — 2500000003 HC RX 250 WO HCPCS: Performed by: NURSE ANESTHETIST, CERTIFIED REGISTERED

## 2023-06-02 PROCEDURE — 7100000001 HC PACU RECOVERY - ADDTL 15 MIN: Performed by: UROLOGY

## 2023-06-02 PROCEDURE — 7100000010 HC PHASE II RECOVERY - FIRST 15 MIN: Performed by: UROLOGY

## 2023-06-02 PROCEDURE — 6360000002 HC RX W HCPCS

## 2023-06-02 PROCEDURE — 3600000014 HC SURGERY LEVEL 4 ADDTL 15MIN: Performed by: UROLOGY

## 2023-06-02 PROCEDURE — 3700000001 HC ADD 15 MINUTES (ANESTHESIA): Performed by: UROLOGY

## 2023-06-02 PROCEDURE — 2580000003 HC RX 258: Performed by: NURSE ANESTHETIST, CERTIFIED REGISTERED

## 2023-06-02 PROCEDURE — 81025 URINE PREGNANCY TEST: CPT

## 2023-06-02 PROCEDURE — 6370000000 HC RX 637 (ALT 250 FOR IP): Performed by: UROLOGY

## 2023-06-02 PROCEDURE — 2500000003 HC RX 250 WO HCPCS: Performed by: UROLOGY

## 2023-06-02 PROCEDURE — 36415 COLL VENOUS BLD VENIPUNCTURE: CPT

## 2023-06-02 PROCEDURE — 84132 ASSAY OF SERUM POTASSIUM: CPT

## 2023-06-02 PROCEDURE — 7100000000 HC PACU RECOVERY - FIRST 15 MIN: Performed by: UROLOGY

## 2023-06-02 PROCEDURE — 3700000000 HC ANESTHESIA ATTENDED CARE: Performed by: UROLOGY

## 2023-06-02 PROCEDURE — 6360000002 HC RX W HCPCS: Performed by: NURSE ANESTHETIST, CERTIFIED REGISTERED

## 2023-06-02 DEVICE — TRANSOBTURATOR SLING SYSTEM WITH PRECISIONBLUE™ DESIGN
Type: IMPLANTABLE DEVICE | Site: VAGINA | Status: FUNCTIONAL
Brand: OBTRYX™ II SYSTEM - HALO

## 2023-06-02 RX ORDER — LIDOCAINE HYDROCHLORIDE 20 MG/ML
INJECTION, SOLUTION INTRAVENOUS PRN
Status: DISCONTINUED | OUTPATIENT
Start: 2023-06-02 | End: 2023-06-02 | Stop reason: SDUPTHER

## 2023-06-02 RX ORDER — MEPERIDINE HYDROCHLORIDE 25 MG/ML
12.5 INJECTION INTRAMUSCULAR; INTRAVENOUS; SUBCUTANEOUS EVERY 5 MIN PRN
Status: DISCONTINUED | OUTPATIENT
Start: 2023-06-02 | End: 2023-06-02 | Stop reason: HOSPADM

## 2023-06-02 RX ORDER — LIDOCAINE HYDROCHLORIDE AND EPINEPHRINE 10; 10 MG/ML; UG/ML
INJECTION, SOLUTION INFILTRATION; PERINEURAL PRN
Status: DISCONTINUED | OUTPATIENT
Start: 2023-06-02 | End: 2023-06-02 | Stop reason: ALTCHOICE

## 2023-06-02 RX ORDER — SODIUM CHLORIDE 9 MG/ML
INJECTION, SOLUTION INTRAVENOUS PRN
Status: DISCONTINUED | OUTPATIENT
Start: 2023-06-02 | End: 2023-06-02 | Stop reason: HOSPADM

## 2023-06-02 RX ORDER — FENTANYL CITRATE 50 UG/ML
INJECTION, SOLUTION INTRAMUSCULAR; INTRAVENOUS PRN
Status: DISCONTINUED | OUTPATIENT
Start: 2023-06-02 | End: 2023-06-02 | Stop reason: SDUPTHER

## 2023-06-02 RX ORDER — DROPERIDOL 2.5 MG/ML
0.62 INJECTION, SOLUTION INTRAMUSCULAR; INTRAVENOUS
Status: DISCONTINUED | OUTPATIENT
Start: 2023-06-02 | End: 2023-06-02 | Stop reason: HOSPADM

## 2023-06-02 RX ORDER — SODIUM CHLORIDE 0.9 % (FLUSH) 0.9 %
5-40 SYRINGE (ML) INJECTION PRN
Status: DISCONTINUED | OUTPATIENT
Start: 2023-06-02 | End: 2023-06-02 | Stop reason: HOSPADM

## 2023-06-02 RX ORDER — CEPHALEXIN 500 MG/1
500 CAPSULE ORAL 3 TIMES DAILY
Qty: 15 CAPSULE | Refills: 0 | Status: SHIPPED | OUTPATIENT
Start: 2023-06-02 | End: 2023-06-07

## 2023-06-02 RX ORDER — ONDANSETRON 2 MG/ML
INJECTION INTRAMUSCULAR; INTRAVENOUS PRN
Status: DISCONTINUED | OUTPATIENT
Start: 2023-06-02 | End: 2023-06-02 | Stop reason: SDUPTHER

## 2023-06-02 RX ORDER — HYDROCODONE BITARTRATE AND ACETAMINOPHEN 5; 325 MG/1; MG/1
1 TABLET ORAL EVERY 6 HOURS PRN
Qty: 18 TABLET | Refills: 0 | Status: SHIPPED | OUTPATIENT
Start: 2023-06-02 | End: 2023-06-07

## 2023-06-02 RX ORDER — SODIUM CHLORIDE 9 MG/ML
25 INJECTION, SOLUTION INTRAVENOUS PRN
Status: DISCONTINUED | OUTPATIENT
Start: 2023-06-02 | End: 2023-06-02 | Stop reason: HOSPADM

## 2023-06-02 RX ORDER — SODIUM CHLORIDE 0.9 % (FLUSH) 0.9 %
5-40 SYRINGE (ML) INJECTION EVERY 12 HOURS SCHEDULED
Status: DISCONTINUED | OUTPATIENT
Start: 2023-06-02 | End: 2023-06-02 | Stop reason: HOSPADM

## 2023-06-02 RX ORDER — MORPHINE SULFATE 2 MG/ML
2 INJECTION, SOLUTION INTRAMUSCULAR; INTRAVENOUS EVERY 5 MIN PRN
Status: DISCONTINUED | OUTPATIENT
Start: 2023-06-02 | End: 2023-06-02 | Stop reason: HOSPADM

## 2023-06-02 RX ORDER — LORAZEPAM 2 MG/ML
0.5 INJECTION INTRAMUSCULAR
Status: DISCONTINUED | OUTPATIENT
Start: 2023-06-02 | End: 2023-06-02 | Stop reason: HOSPADM

## 2023-06-02 RX ORDER — HYDRALAZINE HYDROCHLORIDE 20 MG/ML
10 INJECTION INTRAMUSCULAR; INTRAVENOUS
Status: DISCONTINUED | OUTPATIENT
Start: 2023-06-02 | End: 2023-06-02 | Stop reason: HOSPADM

## 2023-06-02 RX ORDER — ROCURONIUM BROMIDE 10 MG/ML
INJECTION, SOLUTION INTRAVENOUS PRN
Status: DISCONTINUED | OUTPATIENT
Start: 2023-06-02 | End: 2023-06-02 | Stop reason: SDUPTHER

## 2023-06-02 RX ORDER — FENTANYL CITRATE 50 UG/ML
50 INJECTION, SOLUTION INTRAMUSCULAR; INTRAVENOUS EVERY 5 MIN PRN
Status: DISCONTINUED | OUTPATIENT
Start: 2023-06-02 | End: 2023-06-02 | Stop reason: HOSPADM

## 2023-06-02 RX ORDER — LABETALOL HYDROCHLORIDE 5 MG/ML
10 INJECTION, SOLUTION INTRAVENOUS
Status: DISCONTINUED | OUTPATIENT
Start: 2023-06-02 | End: 2023-06-02 | Stop reason: HOSPADM

## 2023-06-02 RX ORDER — HYDROCODONE BITARTRATE AND ACETAMINOPHEN 5; 325 MG/1; MG/1
1 TABLET ORAL ONCE
Status: COMPLETED | OUTPATIENT
Start: 2023-06-02 | End: 2023-06-02

## 2023-06-02 RX ORDER — IPRATROPIUM BROMIDE AND ALBUTEROL SULFATE 2.5; .5 MG/3ML; MG/3ML
1 SOLUTION RESPIRATORY (INHALATION) EVERY 4 HOURS PRN
Status: DISCONTINUED | OUTPATIENT
Start: 2023-06-02 | End: 2023-06-02 | Stop reason: HOSPADM

## 2023-06-02 RX ORDER — ONDANSETRON 2 MG/ML
4 INJECTION INTRAMUSCULAR; INTRAVENOUS
Status: DISCONTINUED | OUTPATIENT
Start: 2023-06-02 | End: 2023-06-02 | Stop reason: HOSPADM

## 2023-06-02 RX ORDER — PROPOFOL 10 MG/ML
INJECTION, EMULSION INTRAVENOUS PRN
Status: DISCONTINUED | OUTPATIENT
Start: 2023-06-02 | End: 2023-06-02 | Stop reason: SDUPTHER

## 2023-06-02 RX ORDER — SODIUM CHLORIDE 9 MG/ML
INJECTION, SOLUTION INTRAVENOUS CONTINUOUS PRN
Status: DISCONTINUED | OUTPATIENT
Start: 2023-06-02 | End: 2023-06-02 | Stop reason: SDUPTHER

## 2023-06-02 RX ORDER — MIDAZOLAM HYDROCHLORIDE 1 MG/ML
INJECTION INTRAMUSCULAR; INTRAVENOUS PRN
Status: DISCONTINUED | OUTPATIENT
Start: 2023-06-02 | End: 2023-06-02 | Stop reason: SDUPTHER

## 2023-06-02 RX ORDER — FENTANYL CITRATE 50 UG/ML
INJECTION, SOLUTION INTRAMUSCULAR; INTRAVENOUS
Status: COMPLETED
Start: 2023-06-02 | End: 2023-06-02

## 2023-06-02 RX ORDER — DIPHENHYDRAMINE HYDROCHLORIDE 50 MG/ML
12.5 INJECTION INTRAMUSCULAR; INTRAVENOUS
Status: DISCONTINUED | OUTPATIENT
Start: 2023-06-02 | End: 2023-06-02 | Stop reason: HOSPADM

## 2023-06-02 RX ADMIN — FENTANYL CITRATE 50 MCG: 50 INJECTION, SOLUTION INTRAMUSCULAR; INTRAVENOUS at 13:35

## 2023-06-02 RX ADMIN — LIDOCAINE HYDROCHLORIDE 100 MG: 20 INJECTION INTRAVENOUS at 12:42

## 2023-06-02 RX ADMIN — Medication 2000 MG: at 12:47

## 2023-06-02 RX ADMIN — SODIUM CHLORIDE: 9 INJECTION, SOLUTION INTRAVENOUS at 12:37

## 2023-06-02 RX ADMIN — SODIUM CHLORIDE: 9 INJECTION, SOLUTION INTRAVENOUS at 11:17

## 2023-06-02 RX ADMIN — FENTANYL CITRATE 50 MCG: 50 INJECTION, SOLUTION INTRAMUSCULAR; INTRAVENOUS at 12:42

## 2023-06-02 RX ADMIN — SUGAMMADEX 200 MG: 100 INJECTION, SOLUTION INTRAVENOUS at 13:18

## 2023-06-02 RX ADMIN — MIDAZOLAM 2 MG: 1 INJECTION INTRAMUSCULAR; INTRAVENOUS at 12:38

## 2023-06-02 RX ADMIN — ONDANSETRON 4 MG: 2 INJECTION INTRAMUSCULAR; INTRAVENOUS at 12:42

## 2023-06-02 RX ADMIN — PROPOFOL 150 MG: 10 INJECTION, EMULSION INTRAVENOUS at 12:42

## 2023-06-02 RX ADMIN — HYDROCODONE BITARTRATE AND ACETAMINOPHEN 1 TABLET: 5; 325 TABLET ORAL at 14:53

## 2023-06-02 RX ADMIN — FENTANYL CITRATE 50 MCG: 50 INJECTION, SOLUTION INTRAMUSCULAR; INTRAVENOUS at 13:45

## 2023-06-02 RX ADMIN — FENTANYL CITRATE 50 MCG: 50 INJECTION, SOLUTION INTRAMUSCULAR; INTRAVENOUS at 13:40

## 2023-06-02 RX ADMIN — FENTANYL CITRATE 50 MCG: 50 INJECTION, SOLUTION INTRAMUSCULAR; INTRAVENOUS at 13:03

## 2023-06-02 RX ADMIN — ROCURONIUM BROMIDE 50 MG: 10 INJECTION INTRAVENOUS at 12:42

## 2023-06-02 ASSESSMENT — PAIN DESCRIPTION - DESCRIPTORS
DESCRIPTORS: ACHING;BURNING

## 2023-06-02 ASSESSMENT — PAIN SCALES - GENERAL
PAINLEVEL_OUTOF10: 6
PAINLEVEL_OUTOF10: 5
PAINLEVEL_OUTOF10: 7
PAINLEVEL_OUTOF10: 7
PAINLEVEL_OUTOF10: 6
PAINLEVEL_OUTOF10: 7

## 2023-06-02 ASSESSMENT — PAIN - FUNCTIONAL ASSESSMENT: PAIN_FUNCTIONAL_ASSESSMENT: 0-10

## 2023-06-02 ASSESSMENT — PAIN DESCRIPTION - ORIENTATION
ORIENTATION: RIGHT;LEFT
ORIENTATION: RIGHT;LEFT

## 2023-06-02 ASSESSMENT — PAIN DESCRIPTION - PAIN TYPE
TYPE: SURGICAL PAIN
TYPE: SURGICAL PAIN

## 2023-06-02 ASSESSMENT — PAIN DESCRIPTION - LOCATION
LOCATION: GROIN

## 2023-06-02 NOTE — PROGRESS NOTES
Pt admitted to [facility] room 9 and oriented to unit. SCD sleeves applied. Nares swabbed. Pt verbalized permission for first name, last initial and physicians name on white board. SDS board and discharge criteria explained, pt and family verbalized understanding. Pt denies thoughts of harming self or others. Call light in reach. Family at the bedside.

## 2023-06-02 NOTE — ANESTHESIA PRE PROCEDURE
Department of Anesthesiology  Preprocedure Note       Name:  Anish Rose   Age:  43 y.o.  :  1980                                          MRN:  [id number]         Date:  2023      Surgeon: William Mujica):  Steffany Perry MD    Procedure: Procedure(s):  Cystoscopy, Mid Urethral Sling Placement    Medications prior to admission:   Prior to Admission medications    Medication Sig Start Date End Date Taking? Authorizing Provider   HYDROcodone-acetaminophen (NORCO) 5-325 MG per tablet Take 1 tablet by mouth every 6 hours as needed for Pain. Patient not taking: Reported on 2023    Historical Provider, MD   traMADol (ULTRAM) 50 MG tablet Take 1 tablet by mouth every 6 hours as needed for Pain. Historical Provider, MD   traMADol-acetaminophen (ULTRACET) 37.5-325 MG per tablet Take 1 tablet by mouth every 6 hours as needed for Pain.     Historical Provider, MD   Cyclobenzaprine HCl (FLEXERIL PO) Take 5 mg by mouth 2 times daily as needed  Patient not taking: Reported on 2023    Historical Provider, MD   rizatriptan (MAXALT) 5 MG tablet Take 1 tablet by mouth daily as needed for Migraine  Patient not taking: Reported on 22   Historical Provider, MD   venlafaxine (EFFEXOR XR) 150 MG extended release capsule Take 150 mg by mouth daily  Patient not taking: Reported on 2023   Historical Provider, MD   venlafaxine (EFFEXOR XR) 75 MG extended release capsule Take 75 mg by mouth daily  Patient not taking: Reported on 2023   Historical Provider, MD   busPIRone (BUSPAR) 5 MG tablet Take 5 mg by mouth daily  Patient not taking: Reported on 2023   Historical Provider, MD   omeprazole (PRILOSEC) 40 MG delayed release capsule Take 40 mg by mouth daily  Patient not taking: Reported on 22   Historical Provider, MD   metoprolol succinate (TOPROL XL) 25 MG extended release tablet Take 1 tablet by mouth daily  Patient not taking: Reported on 2023

## 2023-06-02 NOTE — H&P
not taking: Reported on 4/18/2023 3/10/22   Maribell Monroy MD   albuterol (PROVENTIL) (2.5 MG/3ML) 0.083% nebulizer solution Take 3 mLs by nebulization every 6 hours as needed for Wheezing    Historical Provider, MD   aspirin 81 MG EC tablet Take 1 tablet by mouth every 6 hours as needed for Pain 3/17/21   Maribell Monroy MD   SUMAtriptan (IMITREX) 50 MG tablet Take 2 tablets by mouth once    Historical Provider, MD   albuterol sulfate  (90 Base) MCG/ACT inhaler Inhale 2 puffs into the lungs every 6 hours as needed for Wheezing    Historical Provider, MD   ibuprofen (ADVIL;MOTRIN) 800 MG tablet Take 1 tablet by mouth every 6 hours as needed for Pain    Historical Provider, MD       Allergies:  Methylprednisolone    Social History:    Social History     Socioeconomic History    Marital status:      Spouse name: Not on file    Number of children: Not on file    Years of education: Not on file    Highest education level: Not on file   Occupational History    Not on file   Tobacco Use    Smoking status: Never    Smokeless tobacco: Never   Vaping Use    Vaping Use: Never used   Substance and Sexual Activity    Alcohol use: Never     Comment: rarely    Drug use: Never    Sexual activity: Not on file   Other Topics Concern    Not on file   Social History Narrative    Not on file     Social Determinants of Health     Financial Resource Strain: Not on file   Food Insecurity: Not on file   Transportation Needs: Not on file   Physical Activity: Not on file   Stress: Not on file   Social Connections: Not on file   Intimate Partner Violence: Not on file   Housing Stability: Not on file       Family History:    Family History   Problem Relation Age of Onset    No Known Problems Mother     No Known Problems Father        REVIEW OF SYSTEMS:  Constitutional: negative  Eyes: negative  Respiratory: negative  Cardiovascular: negative  Gastrointestinal: negative  Genitourinary: no acute issues  Musculoskeletal:

## 2023-06-02 NOTE — PROGRESS NOTES
Pt returned to [facility] room 9. Vitals and assessment as charted. 0.9 infusing, @200 ml to count from PACU. Pt has jello and water. Family at the bedside. Pt and family verbalized understanding of discharge criteria and call light use. Call light in reach.

## 2023-06-02 NOTE — ANESTHESIA POSTPROCEDURE EVALUATION
Department of Anesthesiology  Postprocedure Note    Patient: Roxy Serra  MRN: [id number]  YOB: 1980  Date of evaluation: 6/2/2023      Procedure Summary     Date: 06/02/23 Room / Location: [place] [address] / zz [facility]    Anesthesia Start: 3930 Anesthesia Stop: 9229    Procedure: Cystoscopy, Mid Urethral Sling Placement (Vagina ) Diagnosis:       Stress incontinence      (Stress incontinence [N39.3])    Surgeons: Charlee Caceres MD Responsible Provider: Gustavo Kebede MD    Anesthesia Type: general ASA Status: 2          Anesthesia Type: No value filed.     Kirsty Phase I: Kirsty Score: 9    Kirsty Phase II:        Anesthesia Post Evaluation    Complications: no

## 2023-06-02 NOTE — PROGRESS NOTES
1326- pt to pacu, resp easy and unlabored, VSS, pt appears in no acute distress  1330- pt successful on bed pan  1335- fentanyl given.   1340- fentanyl given, pt tolerating  1343- pt with oxygen desaturation to 88%, pt placed on 2L NC, pt stabilized  1345- fentanyl given  1355- pt resting in bed with eyes closed, resp easy and unlabored, VSS, pt states pain is improving  1405- pt meets criteria for discharge from pacu, pt transported to [facility]

## 2023-06-02 NOTE — DISCHARGE INSTRUCTIONS
Pt ok to discharge home in good condition  No heavy lifting, >10 lbs for today  Pt should avoid strenuous activity for today  Pt should walk moderately at home  Pt ok to shower   Pt may resume diet as tolerated  Pt should take Rx as directed  No driving while on narcotics  Please call attending physician or hospital  with questions  Call or Present to ED if fever (> 101F), intractable nausea vomiting or pain.   Rx in chart    Pt should follow up with Emily Angel MD, in 4 weeks, call to confirm appointment

## 2023-06-02 NOTE — BRIEF OP NOTE
Brief Postoperative Note      Patient: Claus La  YOB: 1980  MRN: [id number]    Date of Procedure: 6/2/2023    Pre-Op Diagnosis Codes:     * Stress incontinence [N39.3]    Post-Op Diagnosis: Same       Procedure(s):  Cystoscopy, Mid Urethral Sling Placement    Surgeon(s):  Dylon Weinstein MD    Assistant:  * No surgical staff found *    Anesthesia: General    Estimated Blood Loss (mL): Minimal    Complications: None    Specimens:   * No specimens in log *    Implants:  Implant Name Type Inv. Item Serial No.  Lot No. LRB No. Used Action   SLING GYN TRANSOBTURATOR MID URETH HALO W/ PRECISIONBLUE - [id number]  SLING GYN TRANSOBTURATOR MID URETH HALO W/ PRECISIONBLUE  [facility] UROLOGY- [id number] N/A 1 Implanted         Drains:   [REMOVED] Closed/Suction Drain Inferior;Right Back Accordion (Removed)       [REMOVED] Urinary Catheter 02/08/23 Chandler-Temperature (Removed)       Findings: uncomplicated sling. Nadja check 3 weeks w pvr.  Pradeep [place] 4 months      Electronically signed by Aishwarya Muro MD on 6/2/2023 at 1:27 PM

## 2023-06-05 ENCOUNTER — PATIENT MESSAGE (OUTPATIENT)
Dept: UROLOGY | Age: 43
End: 2023-06-05

## 2023-06-12 ENCOUNTER — PATIENT MESSAGE (OUTPATIENT)
Dept: NEUROSURGERY | Age: 43
End: 2023-06-12

## 2023-06-21 NOTE — TELEPHONE ENCOUNTER
From: Amber Emanuel  To: Jana Zhu  Sent: 6/12/2023 3:19 PM EDT  Subject: Disability form    Was wondering if if the MD doctor that did my back surgery filled out that disability paperwork for Hernandez

## 2023-06-27 ENCOUNTER — OFFICE VISIT (OUTPATIENT)
Dept: UROLOGY | Age: 43
End: 2023-06-27
Payer: COMMERCIAL

## 2023-06-27 VITALS — HEIGHT: 63 IN | RESPIRATION RATE: 18 BRPM | BODY MASS INDEX: 31.89 KG/M2 | WEIGHT: 180 LBS

## 2023-06-27 DIAGNOSIS — N39.3 STRESS INCONTINENCE: Primary | ICD-10-CM

## 2023-06-27 LAB
BILIRUBIN URINE: NEGATIVE
BLOOD URINE, POC: NEGATIVE
CHARACTER, URINE: CLEAR
COLOR, URINE: YELLOW
GLUCOSE URINE: NEGATIVE MG/DL
KETONES, URINE: NEGATIVE
LEUKOCYTE CLUMPS, URINE: NEGATIVE
NITRITE, URINE: NEGATIVE
PH, URINE: 5 (ref 5–9)
POST VOID RESIDUAL (PVR): 26 ML
PROTEIN, URINE: NEGATIVE MG/DL
SPECIFIC GRAVITY, URINE: >= 1.03 (ref 1–1.03)
UROBILINOGEN, URINE: 0.2 EU/DL (ref 0–1)

## 2023-06-27 PROCEDURE — 81003 URINALYSIS AUTO W/O SCOPE: CPT

## 2023-06-27 PROCEDURE — 99024 POSTOP FOLLOW-UP VISIT: CPT

## 2023-06-27 PROCEDURE — G8427 DOCREV CUR MEDS BY ELIG CLIN: HCPCS

## 2023-06-27 PROCEDURE — G8417 CALC BMI ABV UP PARAM F/U: HCPCS

## 2023-06-27 PROCEDURE — 51798 US URINE CAPACITY MEASURE: CPT

## 2023-06-27 PROCEDURE — 1036F TOBACCO NON-USER: CPT

## 2023-06-28 ENCOUNTER — HOSPITAL ENCOUNTER (OUTPATIENT)
Dept: CT IMAGING | Age: 43
Discharge: HOME | End: 2023-06-28
Payer: COMMERCIAL

## 2023-06-28 DIAGNOSIS — Z98.1 STATUS POST LUMBAR SPINAL FUSION: ICD-10-CM

## 2023-06-28 PROCEDURE — 72131 CT LUMBAR SPINE W/O DYE: CPT

## 2023-07-05 ENCOUNTER — OFFICE VISIT (OUTPATIENT)
Dept: NEUROSURGERY | Age: 43
End: 2023-07-05
Payer: COMMERCIAL

## 2023-07-05 VITALS
WEIGHT: 180 LBS | HEIGHT: 63 IN | SYSTOLIC BLOOD PRESSURE: 112 MMHG | DIASTOLIC BLOOD PRESSURE: 80 MMHG | BODY MASS INDEX: 31.89 KG/M2

## 2023-07-05 DIAGNOSIS — Z98.1 STATUS POST LUMBAR SPINAL FUSION: Primary | ICD-10-CM

## 2023-07-05 DIAGNOSIS — M51.26 HNP (HERNIATED NUCLEUS PULPOSUS), LUMBAR: ICD-10-CM

## 2023-07-05 PROCEDURE — 99213 OFFICE O/P EST LOW 20 MIN: CPT | Performed by: PHYSICIAN ASSISTANT

## 2023-07-05 PROCEDURE — G8417 CALC BMI ABV UP PARAM F/U: HCPCS | Performed by: PHYSICIAN ASSISTANT

## 2023-07-05 PROCEDURE — G8427 DOCREV CUR MEDS BY ELIG CLIN: HCPCS | Performed by: PHYSICIAN ASSISTANT

## 2023-07-05 PROCEDURE — 1036F TOBACCO NON-USER: CPT | Performed by: PHYSICIAN ASSISTANT

## 2023-07-05 RX ORDER — METHYLPREDNISOLONE 4 MG/1
TABLET ORAL
Qty: 1 KIT | Refills: 0 | Status: SHIPPED | OUTPATIENT
Start: 2023-07-05 | End: 2023-07-11

## 2023-07-05 NOTE — PROGRESS NOTES
[address] [address] [facility]  Dept: [phone]  Dept Fax: [phone]  Loc: [address] Follow Visit  Visit Date: 7/5/2023      Alfredo Velazquez  is a 43 y.o. female who is returning to the office today for a post-op follow-up visit. She had surgery on  2/8/2023 with Dr. Kristofer Casey where she underwent discectomy and concurrent lumbar fusion of U8-M7, without complication. He was last seen and evaluated in our office setting on 3/23/2023 she arrived accompanied by her  with LSO brace intact. Visions are well-healed and she related improvement for lower extremity strength and sensation. He was no longer taking prescriptive pain medications and was treating with over-the-counter medications and exercise. She did relate continued numbness involving the foot to the left lower extremity. Return to work authorization note was processed at that appointment. A 4-week follow-up was to have been scheduled to reassess limitations related to work with a 3-month appointment scheduled to include a CT scan to ascertain progress towards fusion and to rule out any compromise to her indwelling hardware. She arrives today with a CT scan of the lumbar spine imaged on 6/28/2023 reveals postsurgical changes at the fusion site L5-S1 level with no evidence of posterior fusion and no loosening or compromise to the hardware. Relate a recent mechanical fall suffered while camping that occurred prior to the CT scan. Secondary to that fall she relates ongoing back pain with some weakness for the lower extremities bilaterally.   CT scan did not reveal any evidence of compromise to the hardware but does reveal a slight retrolisthesis of lumbar 3 relative lumbar 4 could be positional.  Based on the lingering discomfort from the mechanical fall, a Medrol Dosepak is prescribed to address inflammation with a follow-up appointment

## 2023-08-11 ENCOUNTER — OFFICE VISIT (OUTPATIENT)
Dept: NEUROSURGERY | Age: 43
End: 2023-08-11

## 2023-08-11 VITALS
DIASTOLIC BLOOD PRESSURE: 82 MMHG | BODY MASS INDEX: 31.88 KG/M2 | HEART RATE: 71 BPM | HEIGHT: 63 IN | SYSTOLIC BLOOD PRESSURE: 134 MMHG | WEIGHT: 179.9 LBS

## 2023-08-11 DIAGNOSIS — Z98.1 STATUS POST LUMBAR SPINAL FUSION: Primary | ICD-10-CM

## 2023-08-11 DIAGNOSIS — M51.26 HNP (HERNIATED NUCLEUS PULPOSUS), LUMBAR: ICD-10-CM

## 2023-08-11 NOTE — PROGRESS NOTES
[address]  Dept: [phone]  Dept Fax: [phone]  Loc: [address] [address] Follow Visit  Visit Date: 8/11/2023      Ned Forte  is a 43 y.o. female who is returning to the office today for a post-op follow-up visit. She had surgery on 2/8/2023 with Dr. Beth Beavers where she underwent discectomy and concurrent lumbar fusion of P0-X9, without complication. She arrived to that appointment with a CT scan which revealed intact hardware absent any signs of lucency or compromise. The CT scan was necessitated due to mechanical fall with a Medrol Dosepak prescribed to address inflammation and discomfort. Today's appointment includes a reassessment for return to work. Good she arrives today unaccompanied and ambulating without assistance with prior radiating pain completely resolved. She has returned to relatively normal activities and was without complaints at today's exam.  Her incision is well-healed and she is intact to her baseline. Based on the stable intact nature of today's exam and improvements to date we have agreed to follow-up with her as needed moving forward with encouragement for her to contact our office with any additional questions or concerns or should she experience any significant changes in her general presentation. Patient was evaluated today and is doing very well overall. No new complaints were voiced. Patient  lives with their spouse  Wound: wound healing as expected  Follow-up Studies: No orders of the defined types were placed in this encounter. Assessment/Plan:  Status Post spinal fusion follow-up  Doing very well overall  Encouraged gradual increase in physical and mental activity. Fall precaution and home safety education provided to patient.   Follow-up: Further follow-ups will be as needed with encouragement for her to reach out to our service with any

## 2023-09-25 RX ORDER — METOPROLOL SUCCINATE 25 MG/1
25 TABLET, EXTENDED RELEASE ORAL DAILY
Qty: 90 TABLET | Refills: 3 | Status: SHIPPED | OUTPATIENT
Start: 2023-09-25 | End: 2023-09-26 | Stop reason: SDUPTHER

## 2023-09-26 RX ORDER — METOPROLOL SUCCINATE 25 MG/1
25 TABLET, EXTENDED RELEASE ORAL DAILY
Qty: 90 TABLET | Refills: 3 | Status: SHIPPED | OUTPATIENT
Start: 2023-09-26

## 2023-10-10 ENCOUNTER — OFFICE VISIT (OUTPATIENT)
Dept: UROLOGY | Age: 43
End: 2023-10-10
Payer: COMMERCIAL

## 2023-10-10 VITALS — RESPIRATION RATE: 16 BRPM | WEIGHT: 180 LBS | BODY MASS INDEX: 31.89 KG/M2 | HEIGHT: 63 IN

## 2023-10-10 DIAGNOSIS — N39.3 STRESS INCONTINENCE: Primary | ICD-10-CM

## 2023-10-10 PROCEDURE — G8427 DOCREV CUR MEDS BY ELIG CLIN: HCPCS | Performed by: UROLOGY

## 2023-10-10 PROCEDURE — 99213 OFFICE O/P EST LOW 20 MIN: CPT | Performed by: UROLOGY

## 2023-10-10 PROCEDURE — 1036F TOBACCO NON-USER: CPT | Performed by: UROLOGY

## 2023-10-10 PROCEDURE — G8417 CALC BMI ABV UP PARAM F/U: HCPCS | Performed by: UROLOGY

## 2023-10-10 PROCEDURE — G8484 FLU IMMUNIZE NO ADMIN: HCPCS | Performed by: UROLOGY

## 2023-10-10 NOTE — PROGRESS NOTES
Monserrat Gastelum MD.    [address]  [address] [address]  Dept: [phone]  Dept Fax: [phone]: [facility] -     Patient:  Cydney Hernandez  YOB: 1980    The patient is a 43 y.o. female who presents today for evaluation of the following problems: incontinence  Chief Complaint   Patient presents with    Follow-up     Tsering     referred/consultation requested by Tariq Branham MD.    History of Present Illness:      Stress incontinence  Improved almost 100% after sling  Hx of back pain with syrinx and herniated disk sp fusion of L5-S1 2/8/23. Some urgency remain      Denies dysuria, fever, chills, flank pain, kidney stones, hematuria.   4 vaginal deliveries            Requested/reviewed records from Tariq Branham MD office and/or outside physician/EMR    (Patient's old records have been requested, reviewed and pertinent findings summarized in today's note.)    Procedures Today: N/A      Last several PSA's:  No results found for: \"PSA\"    Last total testosterone:  No results found for: \"TESTOSTERONE\"    Urinalysis today:  No results found for this visit on 10/10/23.       Last BUN and creatinine:  Lab Results   Component Value Date    BUN 11 05/18/2023     Lab Results   Component Value Date    CREATININE 0.60 05/18/2023         Imaging Reviewed during this Office Visit:   Marcella Bender MD independently reviewed the images and verified the radiology reports from:      PAST MEDICAL, FAMILY AND SOCIAL HISTORY:  Past Medical History:   Diagnosis Date    Asthma     Depression     Irregular heart rate     Migraines      Past Surgical History:   Procedure Laterality Date    BACK INJECTION Bilateral 12/08/2022    bilateral sacroiliac joint injection performed by Cherylene Erichsen, DO at [place]      times 4    KNEE SURGERY Left     times 2    LUMBAR

## 2023-10-17 ENCOUNTER — PATIENT MESSAGE (OUTPATIENT)
Dept: PHYSICAL MEDICINE AND REHAB | Age: 43
End: 2023-10-17

## 2023-10-17 DIAGNOSIS — M47.816 LUMBAR SPONDYLOSIS: Primary | ICD-10-CM

## 2023-10-17 DIAGNOSIS — M53.3 SI (SACROILIAC) JOINT DYSFUNCTION: ICD-10-CM

## 2023-10-17 DIAGNOSIS — Z98.890 HISTORY OF BACK SURGERY: ICD-10-CM

## 2023-10-17 DIAGNOSIS — M47.816 LUMBAR SPONDYLOSIS: ICD-10-CM

## 2023-10-17 DIAGNOSIS — M47.819 FACET ARTHROPATHY: ICD-10-CM

## 2023-10-17 NOTE — TELEPHONE ENCOUNTER
OARRS reviewed. UDS:n/a  .   Last seen: 3/14/2023.  Follow-up:   Future Appointments   Date Time Provider [address]   3/7/2024 11:00 AM Yumiko Huerta MD N [facility] [place]   10/16/2024  9:15 AM ELIZABETH Chavez - CNP [address] [facility] [address] to be addressed at [facility] for 10/24 Subjective:       Patient ID: Bebe Valdez is a 66 y.o. female.    Chief Complaint: Follow-up (4 month follow up)    Disclaimer: This note has been generated using voice-recognition software. There may be typographical errors that have been missed during proof-reading    65 yo with prior history of Type II DM presents for follow up.  Last seen 4 months ago, last A1C 6.7 a few days ago  No side effects from Metformin; plans to increase exercise (previously taking care of her father who recently passed)      Diabetes   She has type 2 diabetes mellitus. No MedicAlert identification noted. The initial diagnosis of diabetes was made 3 years ago. Pertinent negatives for hypoglycemia include no confusion, dizziness, headaches, hunger, mood changes, nervousness/anxiousness, pallor, seizures, sleepiness, speech difficulty, sweats or tremors. Associated symptoms include polyuria. Pertinent negatives for diabetes include no blurred vision, no chest pain, no fatigue, no foot paresthesias, no foot ulcerations, no polydipsia, no polyphagia, no visual change, no weakness and no weight loss. Pertinent negatives for hypoglycemia complications include no blackouts, no hospitalization, no nocturnal hypoglycemia, no required assistance and no required glucagon injection. Symptoms are stable. Pertinent negatives for diabetic complications include no autonomic neuropathy, CVA, heart disease, impotence, nephropathy, peripheral neuropathy, PVD or retinopathy. Risk factors for coronary artery disease include dyslipidemia, obesity, post-menopausal and diabetes mellitus. Current diabetic treatment includes diet and oral agent (monotherapy). She is compliant with treatment most of the time. Her weight is fluctuating minimally. She is following a generally healthy diet. Meal planning includes avoidance of concentrated sweets. She has not had a previous visit with a dietitian. She participates in exercise three times a week. There is no  compliance with monitoring of blood glucose. Her home blood glucose trend is fluctuating minimally. She does not see a podiatrist.Eye exam is current.     Review of Systems   Constitutional: Negative for activity change, fatigue, unexpected weight change and weight loss.   Eyes: Negative for blurred vision and visual disturbance.   Respiratory: Negative for cough, chest tightness and shortness of breath.    Cardiovascular: Negative for chest pain.   Endocrine: Positive for polyuria. Negative for cold intolerance, heat intolerance, polydipsia and polyphagia.   Genitourinary: Negative for impotence.   Skin: Negative for pallor.   Neurological: Negative for dizziness, tremors, seizures, speech difficulty, weakness, light-headedness and headaches.   Psychiatric/Behavioral: Negative for confusion. The patient is not nervous/anxious.        Objective:      Physical Exam   Constitutional: She is oriented to person, place, and time. She appears well-developed and well-nourished. No distress.   Neck: Normal range of motion. No JVD present. No thyromegaly present.   Cardiovascular: Normal rate and regular rhythm.    No murmur heard.  Pulses:       Carotid pulses are 2+ on the right side, and 2+ on the left side.       Dorsalis pedis pulses are 2+ on the right side, and 2+ on the left side.        Posterior tibial pulses are 2+ on the right side, and 2+ on the left side.   Pulmonary/Chest: Effort normal and breath sounds normal. She has no wheezes. She has no rales.   Musculoskeletal: She exhibits no edema.   Lymphadenopathy:     She has no cervical adenopathy.   Neurological: She is alert and oriented to person, place, and time.       Assessment:       1. Type 2 diabetes mellitus without complication, without long-term current use of insulin        Plan:       1.  Labs reviewed with pt today  2.  Continue present medications  3.  F/u 4-6 months

## 2023-10-23 ENCOUNTER — TELEPHONE (OUTPATIENT)
Dept: PHYSICAL MEDICINE AND REHAB | Age: 43
End: 2023-10-23

## 2023-10-23 NOTE — TELEPHONE ENCOUNTER
This order got sent to Dr. Darrick Rivera but looks like is your pt. He was [facility] with ordering therapy.  Do you want to sign instead

## 2023-10-23 NOTE — TELEPHONE ENCOUNTER
Order needs placed for therapy at [facility] and therapy .  Please sign

## 2023-10-23 NOTE — TELEPHONE ENCOUNTER
Refer to 10/17 notes. Done on my chart. Attempted to send you order to sign for therapy but does not look went thru to you to sign. Please place/sign order for therapy if it did not go thru correctly for you to sign.

## 2023-10-23 NOTE — TELEPHONE ENCOUNTER
Does she plan to come to her appointment with me tomorrow? If so I can send at the time of our appointment?

## 2023-10-24 ENCOUNTER — OFFICE VISIT (OUTPATIENT)
Dept: PHYSICAL MEDICINE AND REHAB | Age: 43
End: 2023-10-24
Payer: COMMERCIAL

## 2023-10-24 VITALS
BODY MASS INDEX: 31.89 KG/M2 | DIASTOLIC BLOOD PRESSURE: 84 MMHG | SYSTOLIC BLOOD PRESSURE: 114 MMHG | WEIGHT: 180 LBS | HEIGHT: 63 IN

## 2023-10-24 DIAGNOSIS — G89.4 CHRONIC PAIN SYNDROME: ICD-10-CM

## 2023-10-24 DIAGNOSIS — Z98.890 HISTORY OF BACK SURGERY: Primary | ICD-10-CM

## 2023-10-24 DIAGNOSIS — M47.816 LUMBAR SPONDYLOSIS: ICD-10-CM

## 2023-10-24 PROCEDURE — G8417 CALC BMI ABV UP PARAM F/U: HCPCS | Performed by: NURSE PRACTITIONER

## 2023-10-24 PROCEDURE — G8427 DOCREV CUR MEDS BY ELIG CLIN: HCPCS | Performed by: NURSE PRACTITIONER

## 2023-10-24 PROCEDURE — G8484 FLU IMMUNIZE NO ADMIN: HCPCS | Performed by: NURSE PRACTITIONER

## 2023-10-24 PROCEDURE — 99214 OFFICE O/P EST MOD 30 MIN: CPT | Performed by: NURSE PRACTITIONER

## 2023-10-24 PROCEDURE — 1036F TOBACCO NON-USER: CPT | Performed by: NURSE PRACTITIONER

## 2023-10-24 RX ORDER — PREGABALIN 150 MG/1
150 CAPSULE ORAL NIGHTLY
Qty: 30 CAPSULE | Refills: 2 | Status: SHIPPED | OUTPATIENT
Start: 2023-10-24 | End: 2024-01-22

## 2023-10-24 NOTE — PROGRESS NOTES
Chronic Pain/PM&R Clinic Note     Encounter Date: 10/24/23    Subjective:   Chief Complaint:   Chief Complaint   Patient presents with    Follow-up     Discuss PT     Medication Refill     Pregabalin       History of Present Illness:   Topher Lucas is a 43 y.o. female seen in the clinic initially on 11/10/2022 upon request from Irene Brown  for her history of low back pain. Patient states her pain started about one year ago without any inciting event. She states she does work in [employer] which has required her to lift 40-60 pounds at one time. She wonders if this repetitive work has lead to her current issues. She states her pain has been getting gradually worse. Pain is aggravated by sitting, walking or standing for any amount of time. She states she has not found anything to help with her pain. She does get some pain radiating into the posterior aspect of bilateral thighs. She denies history of falls. She does not use an assistive device for ambulation. She does not notice trouble with balance but does feels like she has tripped over her own feet more recently. She states she has not been sleeping well due to not being able to gt comfortable. She pursued physical therapy at [place] a couple months ago. Physical therapy aggravated her pain significantly. She does feel like she has weakness in her legs, the left greater than her right. She states she cannot lift her legs due to pain in her back. She denies any saddle anesthesia or bowel/bladder incontinence. Today, 10/24/2023, patient presents for planned follow-up on chronic pain. She states overall she has been doing relatively well since having back surgery back in February. She states she does have some issues with restless legs at night. She states her legs feel like they want to stretch out and then cannot move. She states it is hard to explain.   She had some leftover Lyrica 150 mg at home from when I prescribed at the

## 2023-10-24 NOTE — PROGRESS NOTES
Functionality Assessment/Goals Worksheet     On a scale of 0 (Does not Interfere) to 10 (Completely Interferes)     1. Which number describes how during the past week pain has interfered with           the following:  A. General Activity:  6  B. Mood: 0  C. Walking Ability:  6  D. Normal Work (Includes both work outside the home and housework):  6  E. Relations with Other People:   0  F. Sleep:   1  G. Enjoyment of Life:   3    2. Patient Prefers to Take their Pain Medications:     []  On a regular basis   [x]  Only when necessary    []  Does not take pain medications    3. What are the Patient's Goals/Expectations for Visiting Pain Management?      []  Learn about my pain    []  Receive Medication   [x]  Physical Therapy     []  Treat Depression   []  Receive Injections    []  Treat Sleep   []  Deal with Anxiety and Stress   []  Treat Opoid Dependence/Addiction   []  Other:
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
## 2023-11-17 ENCOUNTER — PATIENT MESSAGE (OUTPATIENT)
Dept: PHYSICAL MEDICINE AND REHAB | Age: 43
End: 2023-11-17

## 2023-11-17 DIAGNOSIS — M47.816 LUMBAR SPONDYLOSIS: Primary | ICD-10-CM

## 2023-11-17 DIAGNOSIS — Z98.890 HISTORY OF BACK SURGERY: ICD-10-CM

## 2023-11-21 NOTE — TELEPHONE ENCOUNTER
From: Fabiola Vega  To: Tiffanie Pino  Sent: 11/17/2023 10:37 PM EST  Subject: Question    My prescription medication is working wonders for my restless leg but I've been having hard time sleeping. Karthik what to do.

## 2023-11-28 RX ORDER — PREGABALIN 200 MG/1
200 CAPSULE ORAL NIGHTLY
Qty: 30 CAPSULE | Refills: 0 | Status: SHIPPED | OUTPATIENT
Start: 2023-11-28 | End: 2023-12-28

## 2023-12-28 ENCOUNTER — OFFICE VISIT (OUTPATIENT)
Dept: PHYSICAL MEDICINE AND REHAB | Age: 43
End: 2023-12-28

## 2023-12-28 VITALS
BODY MASS INDEX: 31.88 KG/M2 | SYSTOLIC BLOOD PRESSURE: 126 MMHG | HEIGHT: 63 IN | WEIGHT: 179.9 LBS | DIASTOLIC BLOOD PRESSURE: 86 MMHG

## 2023-12-28 DIAGNOSIS — M79.18 BILATERAL MYOFASCIAL PAIN: ICD-10-CM

## 2023-12-28 DIAGNOSIS — M47.816 LUMBAR SPONDYLOSIS: ICD-10-CM

## 2023-12-28 DIAGNOSIS — G89.4 CHRONIC PAIN SYNDROME: Primary | ICD-10-CM

## 2023-12-28 DIAGNOSIS — Z98.890 HISTORY OF BACK SURGERY: ICD-10-CM

## 2023-12-28 RX ORDER — PREGABALIN 200 MG/1
200 CAPSULE ORAL NIGHTLY
Qty: 30 CAPSULE | Refills: 2 | Status: SHIPPED | OUTPATIENT
Start: 2023-12-28 | End: 2024-03-27

## 2023-12-28 RX ORDER — TRIAMCINOLONE ACETONIDE 40 MG/ML
40 INJECTION, SUSPENSION INTRA-ARTICULAR; INTRAMUSCULAR ONCE
Status: SHIPPED | OUTPATIENT
Start: 2023-12-28

## 2023-12-28 RX ORDER — CYCLOBENZAPRINE HCL 10 MG
10 TABLET ORAL 3 TIMES DAILY PRN
Qty: 30 TABLET | Refills: 0 | Status: SHIPPED | OUTPATIENT
Start: 2023-12-28 | End: 2024-01-07

## 2023-12-29 ENCOUNTER — TELEPHONE (OUTPATIENT)
Dept: CARDIOLOGY CLINIC | Age: 43
End: 2023-12-29

## 2023-12-29 ENCOUNTER — HOSPITAL ENCOUNTER (EMERGENCY)
Age: 43
Discharge: HOME | End: 2023-12-29
Payer: COMMERCIAL

## 2023-12-29 ENCOUNTER — APPOINTMENT (OUTPATIENT)
Dept: GENERAL RADIOLOGY | Age: 43
End: 2023-12-29
Payer: COMMERCIAL

## 2023-12-29 VITALS
SYSTOLIC BLOOD PRESSURE: 135 MMHG | WEIGHT: 179 LBS | RESPIRATION RATE: 18 BRPM | DIASTOLIC BLOOD PRESSURE: 87 MMHG | TEMPERATURE: 98 F | BODY MASS INDEX: 29.82 KG/M2 | HEIGHT: 65 IN | HEART RATE: 68 BPM | OXYGEN SATURATION: 97 %

## 2023-12-29 DIAGNOSIS — R07.89 CHEST WALL PAIN: Primary | ICD-10-CM

## 2023-12-29 LAB
ALBUMIN SERPL BCG-MCNC: 4.6 G/DL (ref 3.5–5.1)
ALP SERPL-CCNC: 92 U/L (ref 38–126)
ALT SERPL W/O P-5'-P-CCNC: 110 U/L (ref 11–66)
ANION GAP SERPL CALC-SCNC: 14 MEQ/L (ref 8–16)
AST SERPL-CCNC: 57 U/L (ref 5–40)
BASOPHILS ABSOLUTE: 0 THOU/MM3 (ref 0–0.1)
BASOPHILS NFR BLD AUTO: 0.5 %
BILIRUB SERPL-MCNC: 1 MG/DL (ref 0.3–1.2)
BUN SERPL-MCNC: 12 MG/DL (ref 7–22)
CALCIUM SERPL-MCNC: 9.8 MG/DL (ref 8.5–10.5)
CHLORIDE SERPL-SCNC: 102 MEQ/L (ref 98–111)
CO2 SERPL-SCNC: 23 MEQ/L (ref 23–33)
CREAT SERPL-MCNC: 0.6 MG/DL (ref 0.4–1.2)
DEPRECATED RDW RBC AUTO: 48.9 FL (ref 35–45)
EOSINOPHIL NFR BLD AUTO: 0.8 %
EOSINOPHILS ABSOLUTE: 0.1 THOU/MM3 (ref 0–0.4)
ERYTHROCYTE [DISTWIDTH] IN BLOOD BY AUTOMATED COUNT: 13.7 % (ref 11.5–14.5)
GFR SERPL CREATININE-BSD FRML MDRD: > 60 ML/MIN/1.73M2
GLUCOSE SERPL-MCNC: 114 MG/DL (ref 70–108)
HCT VFR BLD AUTO: 43.8 % (ref 37–47)
HGB BLD-MCNC: 14 GM/DL (ref 12–16)
IMM GRANULOCYTES # BLD AUTO: 0.09 THOU/MM3 (ref 0–0.07)
IMM GRANULOCYTES NFR BLD AUTO: 0.9 %
LYMPHOCYTES ABSOLUTE: 1.4 THOU/MM3 (ref 1–4.8)
LYMPHOCYTES NFR BLD AUTO: 14.6 %
MCH RBC QN AUTO: 31.4 PG (ref 26–33)
MCHC RBC AUTO-ENTMCNC: 32 GM/DL (ref 32.2–35.5)
MCV RBC AUTO: 98.2 FL (ref 81–99)
MONOCYTES ABSOLUTE: 0.5 THOU/MM3 (ref 0.4–1.3)
MONOCYTES NFR BLD AUTO: 4.6 %
NEUTROPHILS NFR BLD AUTO: 78.6 %
NRBC BLD AUTO-RTO: 0 /100 WBC
OSMOLALITY SERPL CALC.SUM OF ELEC: 278.2 MOSMOL/KG (ref 275–300)
PLATELET # BLD AUTO: 217 THOU/MM3 (ref 130–400)
PMV BLD AUTO: 9.7 FL (ref 9.4–12.4)
POTASSIUM SERPL-SCNC: 4 MEQ/L (ref 3.5–5.2)
PROT SERPL-MCNC: 7.5 G/DL (ref 6.1–8)
RBC # BLD AUTO: 4.46 MILL/MM3 (ref 4.2–5.4)
SEGMENTED NEUTROPHILS ABSOLUTE COUNT: 7.7 THOU/MM3 (ref 1.8–7.7)
SODIUM SERPL-SCNC: 139 MEQ/L (ref 135–145)
TROPONIN, HIGH SENSITIVITY: < 6 NG/L (ref 0–12)
WBC # BLD AUTO: 9.8 THOU/MM3 (ref 4.8–10.8)

## 2023-12-29 PROCEDURE — 80053 COMPREHEN METABOLIC PANEL: CPT

## 2023-12-29 PROCEDURE — 99285 EMERGENCY DEPT VISIT HI MDM: CPT

## 2023-12-29 PROCEDURE — 36415 COLL VENOUS BLD VENIPUNCTURE: CPT

## 2023-12-29 PROCEDURE — 85025 COMPLETE CBC W/AUTO DIFF WBC: CPT

## 2023-12-29 PROCEDURE — 84484 ASSAY OF TROPONIN QUANT: CPT

## 2023-12-29 PROCEDURE — 71046 X-RAY EXAM CHEST 2 VIEWS: CPT

## 2023-12-29 PROCEDURE — 93005 ELECTROCARDIOGRAM TRACING: CPT | Performed by: FAMILY MEDICINE

## 2023-12-29 PROCEDURE — 93010 ELECTROCARDIOGRAM REPORT: CPT | Performed by: INTERNAL MEDICINE

## 2023-12-29 RX ORDER — ONDANSETRON 4 MG/1
4 TABLET, FILM COATED ORAL 3 TIMES DAILY PRN
Qty: 15 TABLET | Refills: 0 | Status: SHIPPED | OUTPATIENT
Start: 2023-12-29

## 2023-12-29 ASSESSMENT — PAIN - FUNCTIONAL ASSESSMENT: PAIN_FUNCTIONAL_ASSESSMENT: NONE - DENIES PAIN

## 2023-12-29 NOTE — TELEPHONE ENCOUNTER
----- Message from Ivon Russ sent at 12/29/2023  9:29 AM EST -----  Regarding: High Blood Pressure  Contact: [phone]  My last pressure taken      I'm not sure what to do. I threw up everytime it is high. Karthik what's causing ot. I'm not under any kind of stress at all.

## 2023-12-29 NOTE — TELEPHONE ENCOUNTER
Seen in my office in march 2023 for palpitation and dizziness  BP in the office then was normal    Unfortunately I do not have clears answer for it.    If it persist mat need  re-eval at pcp or myself wherever pat prefers

## 2023-12-29 NOTE — DISCHARGE INSTRUCTIONS
Your evaluated in the ER for chest pain. I suspect is likely chest wall pain. Your workup here in the ER was negative and your troponin test was negative and your EKG was unremarkable for any evidence of heart attack or heart related pain. The chest x-ray showed no abnormalities. I recommend you take Tylenol and Motrin at home for pain control. He can also use over-the-counter Lidoderm patches for pain control as well. I will give you a short course of Zofran for home to help with any nausea you may experience at home as well. Reviewed return to ED if any new or worsening complaints or concerns otherwise follow-up with your PCP and discussed with them for consideration for outpatient stress testing as you do have some cardiac risk factors.

## 2023-12-29 NOTE — ED TRIAGE NOTES
Pt presents to the ed with c/o chest pain and hypertension. Pt states that she began having high readings at 0430 today. Pt states that she is having chest pain 6/10 at this time. Pt states that she follows with cardiology for syncopal episodes. EKG complete. Tele placed.

## 2023-12-30 LAB
EKG ATRIAL RATE: 66 BPM
EKG P AXIS: 41 DEGREES
EKG P-R INTERVAL: 156 MS
EKG Q-T INTERVAL: 408 MS
EKG QRS DURATION: 86 MS
EKG QTC CALCULATION (BAZETT): 427 MS
EKG R AXIS: 33 DEGREES
EKG T AXIS: 27 DEGREES
EKG VENTRICULAR RATE: 66 BPM

## 2023-12-31 NOTE — ED PROVIDER NOTES
[facility] EMERGENCY DEPT  EMERGENCY DEPARTMENT ENCOUNTER      Pt Name: Ivon Russ  MRN: [id number]  Birthdate 1980  Date of evaluation: 12/29/2023  Provider: Corbin Chawla DO    CHIEF COMPLAINT       Chief Complaint   Patient presents with    Chest Pain    Hypertension         HISTORY OF PRESENT ILLNESS   (Location/Symptom, Timing/Onset, Context/Setting, Quality, Duration, Modifying Factors, Severity)  Note limiting factors.   Ivon Russ is a 43 y.o. female who presents to the emergency department   Chief complaint of chest pain  as well as hypertension.    HPI    43-year-old female presenting to the emergency department for evaluation of chest pain hypertension.  She started having high blood pressure readings at approximately 4:00 a.m. this morning.  She was having chest pain since that time as well.  She reports cream currently complaining of 6/10 pain.  She describes an aching sharp pain.  She does report occasional mild associated cough.  Nothing seems to make it better or worse.  Seems to come on its own.  No abdominal pain, shortness a breath, nausea, vomiting, or pain elsewhere.  No other acute complaints or concerns at this time.    Nursing Notes were reviewed.    REVIEW OF SYSTEMS    (2-9 systems for level 4, 10 or more for level 5)     Review of Systems   Constitutional:  Negative for fever.   HENT:  Negative for ear pain.    Eyes:  Negative for pain.   Respiratory:  Negative for cough and shortness of breath.    Cardiovascular:  Positive for chest pain.   Gastrointestinal:  Negative for abdominal pain, diarrhea, nausea and vomiting.   Genitourinary:  Negative for dysuria and flank pain.   Musculoskeletal:  Negative for back pain and neck pain.   Skin:  Negative for rash.   Neurological:  Negative for headaches.   Psychiatric/Behavioral:  Negative for confusion.        Except as noted above the remainder of the review of systems was reviewed and negative.       PAST MEDICAL HISTORY

## 2024-01-10 ENCOUNTER — OFFICE VISIT (OUTPATIENT)
Dept: CARDIOLOGY CLINIC | Age: 44
End: 2024-01-10

## 2024-01-10 VITALS
WEIGHT: 178.8 LBS | HEART RATE: 74 BPM | BODY MASS INDEX: 31.68 KG/M2 | SYSTOLIC BLOOD PRESSURE: 109 MMHG | HEIGHT: 63 IN | DIASTOLIC BLOOD PRESSURE: 69 MMHG

## 2024-01-10 DIAGNOSIS — R00.2 INTERMITTENT PALPITATIONS: Primary | ICD-10-CM

## 2024-01-10 DIAGNOSIS — Z87.898 HISTORY OF SYNCOPE: ICD-10-CM

## 2024-01-10 RX ORDER — CYCLOBENZAPRINE HCL 10 MG
10 TABLET ORAL 3 TIMES DAILY PRN
Qty: 90 TABLET | Refills: 0 | Status: SHIPPED | OUTPATIENT
Start: 2024-01-10 | End: 2024-02-09

## 2024-01-10 NOTE — PROGRESS NOTES
Chief Complaint   Patient presents with    Check-Up    Palpitations     Referred for syncope from neurology at [facility]    Hx of Two episodes syncope  2019-syncope  Happened at work  Does not remember pre and after and vene does not remember being and going to work that day    2nd one few days later at work  Work in retaurant- as help  And   NO memory to predrome    Post drome   Feel tired and drained  Does not remember most    No Injury externally          10 month f/u Pt here for a check up - irregular heart beat    Seen in the ER for chest pain and HTN- caprice got better on its own and sent home dec 29, 2023  Later pcp started low dose  BP med on 24- start lyrica, lisinopril 5, and zofran s needed    Home sbp 120 to 140    EKG done 23    Denied chest pain, sob,  dizziness or edema    Occasional palpitations cont to have    Better fater better hydration    No more dizziness  No more vertigo    Hx of Dizziness - room spinning- worse with change of head position , sitting supine or standing  Noted 2 days back  And none after and none for a while before  Previous hx of vertigo    Hx of chronic atypical cp  And noncardiac  Getting better and less frequent,       Hx of Pyrosis and help with tums    Hx of non-psotural dizziness and resolved  Hx of Dizziness-in supine , sitting or standing  Hx of vertigo- resolved    No syncope since 2.5 years    nevere smoked    FHX  Parent and sisters okay  None in grand parents      Patient Active Problem List   Diagnosis    Dizziness/ Vertigo    Intermittent palpitations    History of syncope    Chest wall pain    BPPV (benign paroxysmal positional vertigo)    Lumbar radiculopathy    Hypokalemia    Elective surgery       Past Surgical History:   Procedure Laterality Date    BACK INJECTION Bilateral 2022    bilateral sacroiliac joint injection performed by Cristian Meade DO at [facility] OR     SECTION      times 4    KNEE SURGERY Left

## 2024-01-10 NOTE — TELEPHONE ENCOUNTER
OARRS reviewed. UDS: n/a.   Last seen: 12/28/2023. Follow-up:   Future Appointments   Date Time Provider Department Center   1/26/2024  9:00 AM Berta Olivo PA-C N [facility]   1/26/2024 11:15 AM STR CARDIAC MONITOR STR[facility] JOANNA STR Rad[facility]   2/8/2024  8:00 AM Joan Bales APRN - CNP N [facility] [place]   10/16/2024  9:15 AM Loyda Pemberton APRN - CNP N [facility]   1/28/2025  1:15 PM Harley Temple MD N [facility]

## 2024-01-26 ENCOUNTER — HOSPITAL ENCOUNTER (OUTPATIENT)
Age: 44
End: 2024-01-26
Attending: INTERNAL MEDICINE
Payer: COMMERCIAL

## 2024-01-26 ENCOUNTER — OFFICE VISIT (OUTPATIENT)
Dept: NEUROSURGERY | Age: 44
End: 2024-01-26

## 2024-01-26 VITALS
HEART RATE: 96 BPM | WEIGHT: 178.79 LBS | HEIGHT: 63 IN | SYSTOLIC BLOOD PRESSURE: 115 MMHG | DIASTOLIC BLOOD PRESSURE: 85 MMHG | BODY MASS INDEX: 31.68 KG/M2

## 2024-01-26 DIAGNOSIS — Z87.898 HISTORY OF SYNCOPE: ICD-10-CM

## 2024-01-26 DIAGNOSIS — Z98.1 STATUS POST LUMBAR SPINAL FUSION: Primary | ICD-10-CM

## 2024-01-26 DIAGNOSIS — M51.26 HNP (HERNIATED NUCLEUS PULPOSUS), LUMBAR: ICD-10-CM

## 2024-01-26 DIAGNOSIS — R00.2 INTERMITTENT PALPITATIONS: ICD-10-CM

## 2024-01-26 LAB — ECHO BSA: 1.9 M2

## 2024-01-26 PROCEDURE — 93246 EXT ECG>7D<15D RECORDING: CPT

## 2024-01-26 NOTE — PROGRESS NOTES
[facility] [place] [facility]  [facility] NEUROSURGERY  [address]  [place] [address]  Dept: [phone]  Dept Fax: [phone]  Loc: [phone]    Post Op Follow Visit  Visit Date: 1/26/2024      Ivon  is a 43 y.o. female who is returning to the office today for a post-op follow-up visit. She had surgery on 2/8/2023 with Dr. Kee/neurosurgeon where she underwent discectomy and concurrent lumbar fusion of L5-S1, without complication.  Patient was most recently seen and evaluated in our office on 8/11/2023 for reassessment for return to work evaluation.  She arrived unaccompanied and ambulating without assistance with prior radiating pain completely resolved.  She had return to normal activities without complaints by the time of that exam and her incision was well-healed.  We had agreed to follow-up with her as needed at that time.  She arrives today unaccompanied and ambulating without assistance with complaints of primary left lower extremity numbness and tingling which transits all the way to the foot.  She does relate that she has had numbness in that distribution since the surgery in February.  She does treat with a pain specialist who recently performed a steroid injection which has provided some relief.  On exam she is otherwise stable and neurologically intact to her baseline with no new weaknesses.  She denies any changes in bowel or bladder habits and was absent foot drop.  To rule out compromised indwelling hardware due to the increase in her activity level, a flexion-extension x-ray is ordered along with an updated CT scan of the lumbar spine to rule out any new or additional bony abnormalities contributing to her symptom presentation and for possible surgical planning.  A return to office follow-up appointment in 4 weeks is scheduled to review these imaging findings with encouragement for her to reach out to our service with any additional questions or

## 2024-01-30 RX ORDER — CYCLOBENZAPRINE HCL 10 MG
TABLET ORAL
Qty: 90 TABLET | Refills: 0 | Status: SHIPPED | OUTPATIENT
Start: 2024-01-30 | End: 2024-02-22 | Stop reason: SDUPTHER

## 2024-02-18 LAB — ECHO BSA: 1.9 M2

## 2024-02-19 ENCOUNTER — HOSPITAL ENCOUNTER (OUTPATIENT)
Age: 44
Discharge: HOME | End: 2024-02-19
Payer: COMMERCIAL

## 2024-02-19 ENCOUNTER — HOSPITAL ENCOUNTER (OUTPATIENT)
Dept: CT IMAGING | Age: 44
Discharge: HOME | End: 2024-02-19
Payer: COMMERCIAL

## 2024-02-19 ENCOUNTER — HOSPITAL ENCOUNTER (OUTPATIENT)
Dept: GENERAL RADIOLOGY | Age: 44
Discharge: HOME | End: 2024-02-19
Payer: COMMERCIAL

## 2024-02-19 DIAGNOSIS — Z98.1 STATUS POST LUMBAR SPINAL FUSION: ICD-10-CM

## 2024-02-19 PROCEDURE — 72120 X-RAY BEND ONLY L-S SPINE: CPT

## 2024-02-19 PROCEDURE — 72131 CT LUMBAR SPINE W/O DYE: CPT

## 2024-02-22 RX ORDER — CYCLOBENZAPRINE HCL 10 MG
TABLET ORAL
Qty: 90 TABLET | Refills: 0 | Status: SHIPPED | OUTPATIENT
Start: 2024-02-29

## 2024-02-22 NOTE — TELEPHONE ENCOUNTER
OARRS reviewed. UDS: + for  no results   Last seen: 12/28/2023. Follow-up:   Future Appointments   Date Time Provider Department Center   3/1/2024 12:00 PM Shubham Bedolla PA-C N [facility]   10/16/2024  9:15 AM [facility]ember[facility]   1/28/2025  1:15 PM Harley Temple MD N [facility]

## 2024-02-29 ENCOUNTER — PATIENT MESSAGE (OUTPATIENT)
Dept: PHYSICAL MEDICINE AND REHAB | Age: 44
End: 2024-02-29

## 2024-02-29 DIAGNOSIS — Z98.890 HISTORY OF BACK SURGERY: ICD-10-CM

## 2024-02-29 DIAGNOSIS — M47.816 LUMBAR SPONDYLOSIS: ICD-10-CM

## 2024-02-29 RX ORDER — PREGABALIN 200 MG/1
200 CAPSULE ORAL NIGHTLY
Qty: 30 CAPSULE | Refills: 2 | Status: SHIPPED | OUTPATIENT
Start: 2024-02-29 | End: 2024-04-03 | Stop reason: SDUPTHER
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
## 2024-03-05 ENCOUNTER — OFFICE VISIT (OUTPATIENT)
Dept: NEUROSURGERY | Age: 44
End: 2024-03-05
Payer: COMMERCIAL

## 2024-03-05 VITALS
WEIGHT: 178 LBS | SYSTOLIC BLOOD PRESSURE: 129 MMHG | HEIGHT: 63 IN | DIASTOLIC BLOOD PRESSURE: 89 MMHG | BODY MASS INDEX: 31.54 KG/M2 | HEART RATE: 95 BPM

## 2024-03-05 DIAGNOSIS — G95.0 SYRINX (HCC): ICD-10-CM

## 2024-03-05 DIAGNOSIS — Z98.1 STATUS POST LUMBAR SPINAL FUSION: Primary | ICD-10-CM

## 2024-03-05 DIAGNOSIS — M51.26 HNP (HERNIATED NUCLEUS PULPOSUS), LUMBAR: ICD-10-CM

## 2024-03-05 PROCEDURE — G8484 FLU IMMUNIZE NO ADMIN: HCPCS | Performed by: PHYSICIAN ASSISTANT

## 2024-03-05 PROCEDURE — G8417 CALC BMI ABV UP PARAM F/U: HCPCS | Performed by: PHYSICIAN ASSISTANT

## 2024-03-05 PROCEDURE — G8427 DOCREV CUR MEDS BY ELIG CLIN: HCPCS | Performed by: PHYSICIAN ASSISTANT

## 2024-03-05 PROCEDURE — 1036F TOBACCO NON-USER: CPT | Performed by: PHYSICIAN ASSISTANT

## 2024-03-05 PROCEDURE — 99214 OFFICE O/P EST MOD 30 MIN: CPT | Performed by: PHYSICIAN ASSISTANT

## 2024-03-05 RX ORDER — LISINOPRIL 5 MG/1
5 TABLET ORAL DAILY
COMMUNITY
Start: 2024-02-04

## 2024-03-05 RX ORDER — BUDESONIDE, GLYCOPYRROLATE, AND FORMOTEROL FUMARATE 160; 9; 4.8 UG/1; UG/1; UG/1
2 AEROSOL, METERED RESPIRATORY (INHALATION) 2 TIMES DAILY
COMMUNITY
Start: 2024-01-26

## 2024-03-05 RX ORDER — AMOXICILLIN 500 MG/1
500 CAPSULE ORAL 2 TIMES DAILY
COMMUNITY
Start: 2024-02-29

## 2024-03-05 RX ORDER — AZITHROMYCIN 250 MG/1
250 TABLET, FILM COATED ORAL DAILY
COMMUNITY
Start: 2024-02-01

## 2024-03-05 NOTE — PROGRESS NOTES
[facility] [place] [facility]  [facility] NEUROSURGERY  [address]  [place] [address]  Dept: [phone]  Dept Fax: [phone]  Loc: [phone]    Post Op Follow Visit  Visit Date: 3/5/2024      Ivon  is a 43 y.o. female who is returning to the office today for a post-op follow-up visit. She had surgery on 2/8/2023 with Dr. Kee/neurosurgeon where she underwent discectomy and concurrent lumbar fusion of L5-S1, without complication.  She was most recently seen and evaluated in our office setting on 1/26/2024 where she arrived unaccompanied and ambulating with assistance with complaints of primary left lower extremity numbness and tingling to the foot.  She did relate at that visit that numbness in that distribution has been present since the surgery in February.  A flexion-extension x-ray was ordered to rule out any compromise to her indwelling hardware along with an updated CT scan.  She arrives today having undergone the flexion-extension x-ray on 2/19/2024 which reveals possible migration of interbody spacer at the L5-S1 level projecting slightly posterior.                    An additional finding reveals right L5 pedicle screw lateralization extending extracortical.    These findings are confirmed on the CT scan though not specifically mentioned in the CT scan accompanying report.    She arrives today unaccompanied and ambulating without assistance with continued complaints of numbness involving the feet bilaterally and a left greater than right distribution.  She does state that she has been seen and treated by pain management where she received a steroid injection that provided significant relief and she does relate upcoming appointments for additional injection therapies and is encouraged to keep and maintain that appointment.  A follow-up appointment with our service is scheduled for 2 weeks from today and will include Dr. Kee/neurosurgeon to discuss additional

## 2024-03-22 ENCOUNTER — OFFICE VISIT (OUTPATIENT)
Dept: PHYSICAL MEDICINE AND REHAB | Age: 44
End: 2024-03-22
Payer: COMMERCIAL

## 2024-03-22 ENCOUNTER — OFFICE VISIT (OUTPATIENT)
Dept: NEUROSURGERY | Age: 44
End: 2024-03-22

## 2024-03-22 VITALS
HEIGHT: 63 IN | DIASTOLIC BLOOD PRESSURE: 89 MMHG | SYSTOLIC BLOOD PRESSURE: 123 MMHG | HEART RATE: 90 BPM | BODY MASS INDEX: 31.54 KG/M2 | WEIGHT: 178 LBS

## 2024-03-22 VITALS
WEIGHT: 178 LBS | SYSTOLIC BLOOD PRESSURE: 124 MMHG | HEIGHT: 63 IN | DIASTOLIC BLOOD PRESSURE: 82 MMHG | BODY MASS INDEX: 31.54 KG/M2

## 2024-03-22 DIAGNOSIS — G89.4 CHRONIC PAIN SYNDROME: ICD-10-CM

## 2024-03-22 DIAGNOSIS — M47.816 LUMBAR SPONDYLOSIS: ICD-10-CM

## 2024-03-22 DIAGNOSIS — Z98.1 STATUS POST LUMBAR SPINAL FUSION: Primary | ICD-10-CM

## 2024-03-22 DIAGNOSIS — M79.18 BILATERAL MYOFASCIAL PAIN: Primary | ICD-10-CM

## 2024-03-22 DIAGNOSIS — M51.26 HNP (HERNIATED NUCLEUS PULPOSUS), LUMBAR: ICD-10-CM

## 2024-03-22 DIAGNOSIS — Z98.890 HISTORY OF BACK SURGERY: ICD-10-CM

## 2024-03-22 PROCEDURE — G8484 FLU IMMUNIZE NO ADMIN: HCPCS | Performed by: NURSE PRACTITIONER

## 2024-03-22 PROCEDURE — G8417 CALC BMI ABV UP PARAM F/U: HCPCS | Performed by: NURSE PRACTITIONER

## 2024-03-22 PROCEDURE — 1036F TOBACCO NON-USER: CPT | Performed by: NURSE PRACTITIONER

## 2024-03-22 PROCEDURE — 99214 OFFICE O/P EST MOD 30 MIN: CPT | Performed by: NURSE PRACTITIONER

## 2024-03-22 PROCEDURE — G8427 DOCREV CUR MEDS BY ELIG CLIN: HCPCS | Performed by: NURSE PRACTITIONER

## 2024-03-22 PROCEDURE — 20553 NJX 1/MLT TRIGGER POINTS 3/>: CPT | Performed by: NURSE PRACTITIONER

## 2024-03-22 RX ORDER — CYCLOBENZAPRINE HCL 10 MG
10 TABLET ORAL 2 TIMES DAILY PRN
Qty: 60 TABLET | Refills: 2 | Status: CANCELLED | OUTPATIENT
Start: 2024-03-22 | End: 2024-06-20

## 2024-03-22 RX ORDER — TRIAMCINOLONE ACETONIDE 40 MG/ML
40 INJECTION, SUSPENSION INTRA-ARTICULAR; INTRAMUSCULAR ONCE
Status: COMPLETED | OUTPATIENT
Start: 2024-03-22 | End: 2024-03-22

## 2024-03-22 RX ADMIN — TRIAMCINOLONE ACETONIDE 40 MG: 40 INJECTION, SUSPENSION INTRA-ARTICULAR; INTRAMUSCULAR at 10:33

## 2024-03-22 NOTE — PROGRESS NOTES
[facility] PHYSICIANS [place] SPECIALTY  [facility] NEUROSURGERY  [address]  [facility] [address]  Dept: [phone]  Dept Fax: [phone]  Loc: [phone]    Post Op Follow Visit  Visit Date: 3/22/2024      Ivon  is a 43 y.o. female who is returning to the office today for a post-op follow-up visit. She had surgery on  2/8/2023 with Dr. Kee/neurosurgeon where she underwent discectomy and concurrent lumbar fusion of L5-S1, without complication.  She was most recently seen and evaluated in our office setting on 3/5/2024 with a flexion-extension x-ray image on 2/19/2024 which was reviewed.  Image findings were also reviewed directly by Dr. Kee/neurosurgeon who states that the construct appears stable with appropriate placement for the interbody spacer noted.    Complaints raised at the following visit involved numbness involving the feet bilaterally and a left greater than right distribution which she is treating with pain management injection therapies.    We discussed the review of imaging performed by Dr. Kee, who is happy with the construct as well as positioning for the interbody spacer at L5-S1.  His concern is with the extent of adjacent segment disease incident on the CT scanning.  The distribution of pain and numbness adds to the suspicion for the development of significant adjacent segment disease at the level above and/or below her fusion necessitating the need for updated MRI imaging to ascertain the significance of adjacent segment disease and to rule out possible disc herniation or additional bony abnormalities contributing to her symptom presentation in advance of consideration, consent or scheduling for further future intervention.  I returned office follow-up appointment in 4 weeks will be scheduled to review the MRI with encouragement for her to keep and maintain appointments with other subspecialties including pain management where she is scheduled to undergo

## 2024-03-22 NOTE — PROGRESS NOTES
2019 [facility] Clinical Data Registry (for Quality Improvement)     Postoperative nausea/vomiting risk protocol (Adult = 18 yrs and Pediatric 3-17 yrs)- (430 and 463)  General inhalation anesthetic (NOT TIVA) with PONV risk factors: Yes  Provision of anti-emetic therapy with at least 2 different classes of agents: Yes   Patient DID NOT receive anti-emetic therapy and reason is documented in Medical Record:  N/A    Multimodal Pain Management- (477)  Non-emergent surgery AND patient age >= 18: No  Use of Multimodal Pain Management, two or more drugs and/or interventions, NOT including systemic opioids:   Exception: Documented allergy to multiple classes of analgesics:     Smoking Abstinence (404)  Patient is current smoker (cigarette, pipe, e-cig, marijuanna): No  Elective Surgery:   Abstinence instructions provided prior to day of surgery:   Patient abstained from smoking on day of surgery:     Pre-Op Beta-Blocker in Isolated CABG (44)  Isolated CABG AND patient age >= 18: No  Beta-blocker admin within 24 hours of surgical incision:   Exception:of medical reason(s) for not administering beta blocker within 24 hours prior to surgical incision (e.g., not  indicated,other medical reason):     PACU assessment of acute postoperative pain prior to Anesthesia Care End- Applies to Patients Age = 18- (ABG7)  Initial PACU pain score is which of the following: < 7/10  Patient unable to report pain score: N/A    Post-anesthetic transfer of care checklist/protocol to PACU/ICU- (426 and 427)  Upon conclusion of case, patient transferred to which of the following locations: PACU/Non-ICU  Use of transfer checklist/protocol: Yes  Exclusion: Service Performed in Patient Hospital Room (and thus did not require transfer): N/A  Unplanned admission to ICU related to anesthesia service up through end of PACU care- (MD51)  Unplanned admission to ICU (not initially anticipated at anesthesia start time): No         
Pre-operative Diagnosis: Lumbar pain     Post-operative Diagnosis: Lumbar pain     Procedure: Trigger point injection(s)     Procedure Description:  After having signed the informed consent, the patient was seated in a chair.  The patient's lumbar region was prepped with alcohol wipes.  Trigger points were identified in the bilateral lumbar paraspinals for a total of 10 trigger point injections. After negative aspiration, 1 cc of a mixture containing 1:10 40 mg Kenalo.25% bupivacaine was injected at each trigger point for a total of 10 trigger points. The patient tolerated the procedure well.     Procedural Complications: None        Joan Bales, ELIZABETH - CNP   Interventional Pain Management/PM&R   [facility] Neuroscience and Rehabilitation [place]   
beginning of this year.  She tried taking 2 at night which made her very tired so she cut back to 1 and has been doing pretty well on this dose.  She is wondering if I can refill the Lyrica 150 mg at night.  In regards to her low back she states if she bends over or is down on the floor she has trouble getting back up.  She states it hurts to get back up but then when she is moving she does okay.  She feels a lot of stiffness in her low back and feels her self leaning forward due to being stiff.  She denies any new leg weakness, saddle anesthesia, or bowel/bladder changes.    Today, 12/28/2023, patient presents for planned follow-up on chronic pain.  Patient states she has been taking the Lyrica 200 mg nightly and does notice this has helped significantly with her leg pain.  She has not really been struggling with low back pain.  She states her ibuprofen has not been effective.  She also has tramadol and Norco from the past that she has taken on occasion which is also ineffective.  She states the weather does seem to affect her pain and therefore it has been worse.  She states she just finished physical therapy and has not noticed significant improvement.  She states pain is bad when going from sitting to standing and trying to stand up straight.  She is also getting some pins-and-needles feeling in her toes.  She does have an appointment with neurosurgery in a couple weeks.  She states it feels like her left foot is swollen but it does not look swollen.  She denies any other new symptoms at this time.    Today, 3/22/2024, patient presents for planned follow-up on chronic pain.  Patient states she obtained about 90% relief from her back pain after trigger point injections last visit.  She feels she is not quite back to her baseline but it has worn off and she would like to repeat trigger point injections in office today.  She has not been taking the Flexeril as she does feel it was making her constipated which

## 2024-04-03 DIAGNOSIS — M47.816 LUMBAR SPONDYLOSIS: ICD-10-CM

## 2024-04-03 DIAGNOSIS — Z98.890 HISTORY OF BACK SURGERY: ICD-10-CM

## 2024-04-04 RX ORDER — PREGABALIN 200 MG/1
200 CAPSULE ORAL NIGHTLY
Qty: 30 CAPSULE | Refills: 2 | Status: SHIPPED | OUTPATIENT
Start: 2024-04-04 | End: 2024-07-03

## 2024-04-12 ENCOUNTER — HOSPITAL ENCOUNTER (OUTPATIENT)
Dept: MRI IMAGING | Age: 44
Discharge: HOME | End: 2024-04-12
Payer: COMMERCIAL

## 2024-04-12 DIAGNOSIS — Z98.1 STATUS POST LUMBAR SPINAL FUSION: ICD-10-CM

## 2024-04-12 PROCEDURE — 72158 MRI LUMBAR SPINE W/O & W/DYE: CPT

## 2024-04-12 PROCEDURE — A9579 GAD-BASE MR CONTRAST NOS,1ML: HCPCS | Performed by: PHYSICIAN ASSISTANT

## 2024-04-12 PROCEDURE — 6360000004 HC RX CONTRAST MEDICATION: Performed by: PHYSICIAN ASSISTANT

## 2024-04-12 RX ADMIN — GADOTERIDOL 15 ML: 279.3 INJECTION, SOLUTION INTRAVENOUS at 11:17

## 2024-04-17 ENCOUNTER — OFFICE VISIT (OUTPATIENT)
Dept: NEUROSURGERY | Age: 44
End: 2024-04-17
Payer: COMMERCIAL

## 2024-04-17 VITALS
HEIGHT: 63 IN | DIASTOLIC BLOOD PRESSURE: 86 MMHG | WEIGHT: 178 LBS | HEART RATE: 85 BPM | SYSTOLIC BLOOD PRESSURE: 122 MMHG | BODY MASS INDEX: 31.54 KG/M2

## 2024-04-17 DIAGNOSIS — Z98.1 STATUS POST LUMBAR SPINAL FUSION: Primary | ICD-10-CM

## 2024-04-17 DIAGNOSIS — M51.26 HNP (HERNIATED NUCLEUS PULPOSUS), LUMBAR: ICD-10-CM

## 2024-04-17 DIAGNOSIS — Z98.1 ADJACENT SEGMENT DISEASE OF LUMBAR SPINE WITH HISTORY OF FUSION PROCEDURE: ICD-10-CM

## 2024-04-17 DIAGNOSIS — Z01.818 PRE-OP TESTING: ICD-10-CM

## 2024-04-17 DIAGNOSIS — G95.0 SYRINX (HCC): ICD-10-CM

## 2024-04-17 DIAGNOSIS — M51.36 ADJACENT SEGMENT DISEASE OF LUMBAR SPINE WITH HISTORY OF FUSION PROCEDURE: ICD-10-CM

## 2024-04-17 PROCEDURE — G8427 DOCREV CUR MEDS BY ELIG CLIN: HCPCS | Performed by: PHYSICIAN ASSISTANT

## 2024-04-17 PROCEDURE — 99215 OFFICE O/P EST HI 40 MIN: CPT | Performed by: PHYSICIAN ASSISTANT

## 2024-04-17 PROCEDURE — 1036F TOBACCO NON-USER: CPT | Performed by: PHYSICIAN ASSISTANT

## 2024-04-17 PROCEDURE — G8417 CALC BMI ABV UP PARAM F/U: HCPCS | Performed by: PHYSICIAN ASSISTANT

## 2024-04-17 NOTE — PROGRESS NOTES
possible extension to any additional levels deemed indicated during the procedure.  I described the procedure in detail along with expectations for recovery and the associated risk, which she is familiar with, and understanding those risks she is anxious and amicable to move forward with a consent offered for her signature and surgery scheduling to follow.  In the interim she is encouraged to keep and maintain appointments with other subspecialties, including pain management, and to contact our service with any additional questions or concerns.      Electronically signed by Shubham Bedolla PA-C on 4/17/24 at 8:43 AM EDT

## 2024-06-10 DIAGNOSIS — M47.816 LUMBAR SPONDYLOSIS: ICD-10-CM

## 2024-06-10 DIAGNOSIS — Z98.890 HISTORY OF BACK SURGERY: ICD-10-CM

## 2024-06-10 NOTE — TELEPHONE ENCOUNTER
It doesn't look like any of the rxs from April were filled according to [place]. Can you check if there are refills already at the pharmacy.    4 = No assist / stand by assistance

## 2024-06-11 RX ORDER — PREGABALIN 200 MG/1
200 CAPSULE ORAL NIGHTLY
Qty: 30 CAPSULE | Refills: 2 | Status: SHIPPED | OUTPATIENT
Start: 2024-06-11 | End: 2024-09-09

## 2024-06-11 NOTE — TELEPHONE ENCOUNTER
I called and spoke with Teagan at [facility]. She states there was 2 prescriptions not filled yet. One for 90 days and another for 30 days. She is preparing one of them.

## 2024-06-14 ENCOUNTER — OFFICE VISIT (OUTPATIENT)
Dept: PHYSICAL MEDICINE AND REHAB | Age: 44
End: 2024-06-14

## 2024-06-14 VITALS
SYSTOLIC BLOOD PRESSURE: 124 MMHG | HEIGHT: 63 IN | WEIGHT: 176 LBS | BODY MASS INDEX: 31.18 KG/M2 | DIASTOLIC BLOOD PRESSURE: 74 MMHG

## 2024-06-14 DIAGNOSIS — M47.816 LUMBAR SPONDYLOSIS: ICD-10-CM

## 2024-06-14 DIAGNOSIS — G89.4 CHRONIC PAIN SYNDROME: ICD-10-CM

## 2024-06-14 DIAGNOSIS — M53.3 SI (SACROILIAC) JOINT DYSFUNCTION: ICD-10-CM

## 2024-06-14 DIAGNOSIS — Z98.890 HISTORY OF BACK SURGERY: ICD-10-CM

## 2024-06-14 DIAGNOSIS — M79.18 BILATERAL MYOFASCIAL PAIN: Primary | ICD-10-CM

## 2024-06-14 RX ORDER — TRIAMCINOLONE ACETONIDE 40 MG/ML
40 INJECTION, SUSPENSION INTRA-ARTICULAR; INTRAMUSCULAR ONCE
Status: COMPLETED | OUTPATIENT
Start: 2024-06-14 | End: 2024-06-14

## 2024-06-14 RX ADMIN — TRIAMCINOLONE ACETONIDE 40 MG: 40 INJECTION, SUSPENSION INTRA-ARTICULAR; INTRAMUSCULAR at 11:10

## 2024-06-14 NOTE — PROGRESS NOTES
Functionality Assessment/Goals Worksheet     On a scale of 0 (Does not Interfere) to 10 (Completely Interferes)     1.  Which number describes how during the past week pain has interfered with           the following:  A.  General Activity:  8  B.  Mood: 3  C.  Walking Ability:  6  D.  Normal Work (Includes both work outside the home and housework):  4  E.  Relations with Other People:   0  F.  Sleep:   2  G.  Enjoyment of Life:   2    2.  Patient Prefers to Take their Pain Medications:     []  On a regular basis   []  Only when necessary    [x]  Does not take pain medications    3.  What are the Patient's Goals/Expectations for Visiting Pain Management?     []  Learn about my pain    []  Receive Medication   []  Physical Therapy     []  Treat Depression   [x]  Receive Injections    []  Treat Sleep   []  Deal with Anxiety and Stress   []  Treat Opoid Dependence/Addiction   [x]  Other:                                
Pre-operative Diagnosis: Lumbar myofascial pain     Post-operative Diagnosis: Lumbar myofascial pain     Procedure: Trigger point injection(s)     Procedure Description:  After having signed the informed consent, the patient was seated in a chair.  The patient's lumbar region was prepped with alcohol wipes.  Trigger points were identified in the bilateral lumbar paraspinals for a total of 10 (3 right, 7 left) trigger point injections. After negative aspiration, 1 cc of a mixture containing 1:10 40 mg Kenalo.25% bupivacaine was injected at each trigger point for a total of 10 trigger points. The patient tolerated the procedure well.     Procedural Complications: None        Joan Bales, ELIZABETH - CNP   Interventional Pain Management/PM&R   [facility] Neuroscience and Rehabilitation [place]   
    Referrals:  None    Prescriptions Written This Visit:   None    Follow-up: 12 weeks    ELIZABETH Lim - SHANON

## 2024-07-06 DIAGNOSIS — Z98.890 HISTORY OF BACK SURGERY: ICD-10-CM

## 2024-07-06 DIAGNOSIS — M47.816 LUMBAR SPONDYLOSIS: ICD-10-CM

## 2024-07-09 RX ORDER — PREGABALIN 200 MG/1
200 CAPSULE ORAL NIGHTLY
Qty: 30 CAPSULE | Refills: 2 | OUTPATIENT
Start: 2024-07-09 | End: 2024-10-07

## 2024-07-11 ENCOUNTER — TELEPHONE (OUTPATIENT)
Dept: NEUROSURGERY | Age: 44
End: 2024-07-11

## 2024-08-16 ENCOUNTER — OFFICE VISIT (OUTPATIENT)
Dept: NEUROSURGERY | Age: 44
End: 2024-08-16
Payer: COMMERCIAL

## 2024-08-16 ENCOUNTER — TELEPHONE (OUTPATIENT)
Dept: PHYSICAL MEDICINE AND REHAB | Age: 44
End: 2024-08-16

## 2024-08-16 VITALS
SYSTOLIC BLOOD PRESSURE: 136 MMHG | HEIGHT: 63 IN | WEIGHT: 180 LBS | HEART RATE: 81 BPM | DIASTOLIC BLOOD PRESSURE: 90 MMHG | BODY MASS INDEX: 31.89 KG/M2

## 2024-08-16 DIAGNOSIS — M96.1 FAILED BACK SYNDROME: ICD-10-CM

## 2024-08-16 DIAGNOSIS — M51.36 ADJACENT SEGMENT DISEASE OF LUMBAR SPINE WITH HISTORY OF FUSION PROCEDURE: ICD-10-CM

## 2024-08-16 DIAGNOSIS — Z98.1 STATUS POST LUMBAR SPINAL FUSION: Primary | ICD-10-CM

## 2024-08-16 DIAGNOSIS — M54.16 LUMBAR RADICULOPATHY: ICD-10-CM

## 2024-08-16 DIAGNOSIS — Z98.1 ADJACENT SEGMENT DISEASE OF LUMBAR SPINE WITH HISTORY OF FUSION PROCEDURE: ICD-10-CM

## 2024-08-16 DIAGNOSIS — M51.26 HNP (HERNIATED NUCLEUS PULPOSUS), LUMBAR: ICD-10-CM

## 2024-08-16 PROCEDURE — 99214 OFFICE O/P EST MOD 30 MIN: CPT | Performed by: NEUROLOGICAL SURGERY

## 2024-08-16 PROCEDURE — 1036F TOBACCO NON-USER: CPT | Performed by: NEUROLOGICAL SURGERY

## 2024-08-16 PROCEDURE — G8417 CALC BMI ABV UP PARAM F/U: HCPCS | Performed by: NEUROLOGICAL SURGERY

## 2024-08-16 PROCEDURE — G8427 DOCREV CUR MEDS BY ELIG CLIN: HCPCS | Performed by: NEUROLOGICAL SURGERY

## 2024-08-16 NOTE — TELEPHONE ENCOUNTER
Patient would like to know if TPI Appointment could be moved up sooner than 9/6/24 if possible, Would like to be contacted back

## 2024-08-19 DIAGNOSIS — M47.816 LUMBAR SPONDYLOSIS: ICD-10-CM

## 2024-08-19 DIAGNOSIS — Z98.890 HISTORY OF BACK SURGERY: ICD-10-CM

## 2024-08-20 RX ORDER — PREGABALIN 200 MG/1
200 CAPSULE ORAL NIGHTLY
Qty: 30 CAPSULE | Refills: 2 | OUTPATIENT
Start: 2024-08-20 | End: 2024-11-18

## 2024-08-21 NOTE — TELEPHONE ENCOUNTER
Would like to have 3 months between TPI long term but ok if a little sooner this time, if I have availability.

## 2024-08-24 DIAGNOSIS — Z98.890 HISTORY OF BACK SURGERY: ICD-10-CM

## 2024-08-24 DIAGNOSIS — M47.816 LUMBAR SPONDYLOSIS: ICD-10-CM

## 2024-08-24 RX ORDER — PREGABALIN 200 MG/1
200 CAPSULE ORAL NIGHTLY
Qty: 30 CAPSULE | Refills: 2 | Status: CANCELLED | OUTPATIENT
Start: 2024-08-24 | End: 2024-11-22

## 2024-08-26 NOTE — TELEPHONE ENCOUNTER
Called [facility] to [facility] [place]  spoke Crystal. Per Crystal pt does have refills. Called pt to let her know that she does have refills on file. Pt verbalized understanding.

## 2024-08-27 ENCOUNTER — PATIENT MESSAGE (OUTPATIENT)
Dept: PHYSICAL MEDICINE AND REHAB | Age: 44
End: 2024-08-27

## 2024-08-28 NOTE — TELEPHONE ENCOUNTER
Will call [facility]  once open to verify that they have no refills and then send new if they confirm they do not have the refills.

## 2024-08-30 ENCOUNTER — OFFICE VISIT (OUTPATIENT)
Dept: PHYSICAL MEDICINE AND REHAB | Age: 44
End: 2024-08-30

## 2024-08-30 VITALS
BODY MASS INDEX: 31.89 KG/M2 | DIASTOLIC BLOOD PRESSURE: 78 MMHG | WEIGHT: 180 LBS | SYSTOLIC BLOOD PRESSURE: 100 MMHG | HEIGHT: 63 IN

## 2024-08-30 DIAGNOSIS — M47.816 LUMBAR SPONDYLOSIS: ICD-10-CM

## 2024-08-30 DIAGNOSIS — Z98.890 HISTORY OF BACK SURGERY: Primary | ICD-10-CM

## 2024-08-30 DIAGNOSIS — M79.18 BILATERAL MYOFASCIAL PAIN: ICD-10-CM

## 2024-08-30 DIAGNOSIS — G89.4 CHRONIC PAIN SYNDROME: ICD-10-CM

## 2024-08-30 RX ORDER — ORPHENADRINE CITRATE 100 MG/1
100 TABLET, EXTENDED RELEASE ORAL 2 TIMES DAILY
Qty: 20 TABLET | Refills: 0 | Status: SHIPPED | OUTPATIENT
Start: 2024-08-30 | End: 2024-09-09

## 2024-08-30 RX ORDER — PREGABALIN 200 MG/1
200 CAPSULE ORAL NIGHTLY
Qty: 30 CAPSULE | Refills: 2 | Status: SHIPPED | OUTPATIENT
Start: 2024-08-30 | End: 2024-11-28

## 2024-08-30 RX ORDER — TRIAMCINOLONE ACETONIDE 40 MG/ML
40 INJECTION, SUSPENSION INTRA-ARTICULAR; INTRAMUSCULAR ONCE
Status: COMPLETED | OUTPATIENT
Start: 2024-08-30 | End: 2024-08-30

## 2024-08-30 RX ORDER — BUSPIRONE HYDROCHLORIDE 5 MG/1
5 TABLET ORAL 3 TIMES DAILY
COMMUNITY
Start: 2024-08-27

## 2024-08-30 RX ADMIN — TRIAMCINOLONE ACETONIDE 40 MG: 40 INJECTION, SUSPENSION INTRA-ARTICULAR; INTRAMUSCULAR at 10:08

## 2024-08-30 NOTE — PROGRESS NOTES
Pre-operative Diagnosis:  lumbar myofacial pain     Post-operative Diagnosis: lumbar myofacial pain     Procedure: Trigger point injection(s)     Procedure Description:  After having signed the informed consent, the patient was seated in a chair.  The patient's lumbar region was prepped with alcohol wipes.  Trigger points were identified in the bilateral lumbar paraspinals for a total of 10 trigger point injections. After negative aspiration, 1 cc of a mixture containing 1:10 40 mg Kenalo.25% bupivacaine was injected at each trigger point for a total of 10 trigger points. The patient tolerated the procedure well.     Procedural Complications: None        Joan Bales, ELIZABETH - CNP   Interventional Pain Management/PM&R   [facility] Neuroscience and Rehabilitation [place]

## 2024-08-30 NOTE — PROGRESS NOTES
Chronic Pain/PM&R Clinic Note     Encounter Date: 8/30/24    Subjective:   Chief Complaint:   Chief Complaint   Patient presents with    Injections     TPI        History of Present Illness:   Ivon Russ is a 43 y.o. female seen in the clinic initially on 11/10/2022 upon request from Shubham Castrejon PA  for her history of low back pain. Patient states her pain started about one year ago without any inciting event. 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 She wonders if this repetitive work has lead to her current issues. She states her pain has been getting gradually worse. Pain is aggravated by sitting, walking or standing for any amount of time. She states she has not found anything to help with her pain. She does get some pain radiating into the posterior aspect of bilateral thighs. She denies history of falls. She does not use an assistive device for ambulation. She does not notice trouble with balance but does feels like she has tripped over her own feet more recently. She states she has not been sleeping well due to not being able to gt comfortable. She pursued physical therapy at [facility] a couple months ago. Physical therapy aggravated her pain significantly. She does feel like she has weakness in her legs, the left greater than her right. She states she cannot lift her legs due to pain in her back. She denies any saddle anesthesia or bowel/bladder incontinence.     Today, 10/24/2023, patient presents for planned follow-up on chronic pain.  She states overall she has been doing relatively well since having back surgery back in February.  She states she does have some issues with restless legs at night.  She states her legs feel like they want to stretch out and then cannot move.  She states it is hard to explain.  She had some leftover Lyrica 150 mg at home from when I prescribed at the beginning of this year.  She tried taking 2  bilaterally. SLR neg bilaterally.   Neurological: Cranial nerves II-XII grossly intact.   · Gait - Antalgic gait. Ambulates without assistive device.   · Motor: 3/5 muscle strength in bilateral hip flexion, knee flexion, knee extension, ankle dorsiflexion, and ankle plantar flexion. Pain limited weakness.   · Sensory: LT sensation intact in lower limbs   · Reflexes: 2+ symmetrical in bilateral achilles, 2+ bilateral patellar, negative ankle clonus, downgoing babinski   Skin: No rashes or lesions present   Psychological: Cooperative, no exaggerated pain behaviors     Assessment:    Diagnosis Orders   1. History of back surgery  triamcinolone acetonide (KENALOG-40) injection 40 mg    pregabalin (LYRICA) 200 MG capsule    orphenadrine (NORFLEX) 100 MG extended release tablet      2. Bilateral myofascial pain  triamcinolone acetonide (KENALOG-40) injection 40 mg    orphenadrine (NORFLEX) 100 MG extended release tablet      3. Lumbar spondylosis  pregabalin (LYRICA) 200 MG capsule    orphenadrine (NORFLEX) 100 MG extended release tablet      4. Chronic pain syndrome          Ivon Russ is a 43 y.o.female presenting to the pain clinic for evaluation of low back pain.   She has completed physical therapy for posterior core stretching and strengthening but not noticed much improvement.  We discussed continuing these exercises after completion of dedicated physical therapy to prevent worsening of her spine.  I have continued Lyrica 200 mg nightly.  She responded extremely well to her first trigger point injection but had aggravated pain with the second.  She underwent repeat trigger point ejections in office today.   We will plan for trigger point injections every 12 weeks as needed.   Patient advised to continue to follow-up with neurosurgery for further recommendations in regards to surgery.  I have stopped her Flexeril and started on Norflex 100 mg twice daily.  I have advised her to take this at night but she can

## 2024-08-30 NOTE — PROGRESS NOTES
Functionality Assessment/Goals Worksheet     On a scale of 0 (Does not Interfere) to 10 (Completely Interferes)     1.  Which number describes how during the past week pain has interfered with           the following:  A.  General Activity:  8  B.  Mood: 2  C.  Walking Ability:  3  D.  Normal Work (Includes both work outside the home and housework):  3  E.  Relations with Other People:   0  F.  Sleep:   10  G.  Enjoyment of Life:   0    2.  Patient Prefers to Take their Pain Medications:     []  On a regular basis   []  Only when necessary    [x]  Does not take pain medications    3.  What are the Patient's Goals/Expectations for Visiting Pain Management?     []  Learn about my pain    []  Receive Medication   []  Physical Therapy     []  Treat Depression   [x]  Receive Injections    []  Treat Sleep   []  Deal with Anxiety and Stress   []  Treat Opoid Dependence/Addiction   []  Other:

## 2024-09-03 ENCOUNTER — TELEPHONE (OUTPATIENT)
Dept: NEUROSURGERY | Age: 44
End: 2024-09-03

## 2024-09-03 ENCOUNTER — TELEPHONE (OUTPATIENT)
Dept: PHYSICAL MEDICINE AND REHAB | Age: 44
End: 2024-09-03

## 2024-09-03 NOTE — TELEPHONE ENCOUNTER
PA sent to plan   (Key: [id number])  PA Case ID #: [id number]  Rx #: [id number]  Orphenadrine Citrate ER 100MG er tablets

## 2024-09-03 NOTE — TELEPHONE ENCOUNTER
The CT lumbar spine was denied and the reasons are as follows:        Peer to peer can be performed at [phone] tracking# [id number]. Pt currently on the schedule for the CT tomorrow 9/4. The denial letter has also been scanned into the pt's chart

## 2024-09-08 ENCOUNTER — PATIENT MESSAGE (OUTPATIENT)
Dept: PHYSICAL MEDICINE AND REHAB | Age: 44
End: 2024-09-08

## 2024-09-17 NOTE — TELEPHONE ENCOUNTER
A new case was initiated for the CT lumbar spine and was again denied. Pt currently on the schedule for the CT 9/19. Pt should not proceed as this will be an uncovered benefit for the pt. Provider can perform the peer to peer at [phone] tracking# [id number]

## 2024-09-18 ENCOUNTER — TELEPHONE (OUTPATIENT)
Dept: ADMINISTRATIVE | Age: 44
End: 2024-09-18

## 2024-09-18 NOTE — TELEPHONE ENCOUNTER
Prior Auth for CT Lumbar scheduled for 09/19/2024 denied. Requesting a Peer to Peer at this time.    Reason:         Reference #: [id number]   Payer Info:     To exercise your option for a peer review, please call [employer] at [phone]- [phone] within 5 business days of the denial letter (dated 09/13/2024).    Please see denial letter attached to referral for more information. Also, please contact patient to ensure they are aware of the next steps in their care.

## 2024-09-20 ENCOUNTER — TELEPHONE (OUTPATIENT)
Dept: NEUROSURGERY | Age: 44
End: 2024-09-20

## 2024-09-20 NOTE — TELEPHONE ENCOUNTER
Called spoke to patient explained to her we are trying to get her CT approved that goes along with her Discography. Patient states she would rather wait until the beginning of the year to proceed with everything. I made patient an appointment for 1/3/25 at 9:00 am.

## 2024-09-25 DIAGNOSIS — Z98.890 HISTORY OF BACK SURGERY: ICD-10-CM

## 2024-09-25 DIAGNOSIS — M79.18 BILATERAL MYOFASCIAL PAIN: ICD-10-CM

## 2024-09-25 DIAGNOSIS — M47.816 LUMBAR SPONDYLOSIS: ICD-10-CM

## 2024-09-26 RX ORDER — PREGABALIN 200 MG/1
200 CAPSULE ORAL NIGHTLY
Qty: 30 CAPSULE | Refills: 2 | Status: SHIPPED | OUTPATIENT
Start: 2024-09-27 | End: 2024-11-14 | Stop reason: SDUPTHER

## 2024-09-26 RX ORDER — ORPHENADRINE CITRATE 100 MG/1
100 TABLET ORAL 2 TIMES DAILY
Qty: 60 TABLET | Refills: 0 | Status: SHIPPED | OUTPATIENT
Start: 2024-09-26 | End: 2024-10-22 | Stop reason: SDUPTHER

## 2024-10-22 ENCOUNTER — OFFICE VISIT (OUTPATIENT)
Dept: PHYSICAL MEDICINE AND REHAB | Age: 44
End: 2024-10-22
Payer: COMMERCIAL

## 2024-10-22 VITALS
BODY MASS INDEX: 31.89 KG/M2 | SYSTOLIC BLOOD PRESSURE: 132 MMHG | WEIGHT: 180 LBS | DIASTOLIC BLOOD PRESSURE: 88 MMHG | HEIGHT: 63 IN

## 2024-10-22 DIAGNOSIS — Z98.890 HISTORY OF BACK SURGERY: ICD-10-CM

## 2024-10-22 DIAGNOSIS — M79.18 BILATERAL MYOFASCIAL PAIN: ICD-10-CM

## 2024-10-22 DIAGNOSIS — M47.816 LUMBAR SPONDYLOSIS: ICD-10-CM

## 2024-10-22 DIAGNOSIS — G89.4 CHRONIC PAIN SYNDROME: Primary | ICD-10-CM

## 2024-10-22 PROCEDURE — G8427 DOCREV CUR MEDS BY ELIG CLIN: HCPCS | Performed by: NURSE PRACTITIONER

## 2024-10-22 PROCEDURE — G8417 CALC BMI ABV UP PARAM F/U: HCPCS | Performed by: NURSE PRACTITIONER

## 2024-10-22 PROCEDURE — 99214 OFFICE O/P EST MOD 30 MIN: CPT | Performed by: NURSE PRACTITIONER

## 2024-10-22 PROCEDURE — 1036F TOBACCO NON-USER: CPT | Performed by: NURSE PRACTITIONER

## 2024-10-22 PROCEDURE — G8484 FLU IMMUNIZE NO ADMIN: HCPCS | Performed by: NURSE PRACTITIONER

## 2024-10-22 RX ORDER — ORPHENADRINE CITRATE 100 MG/1
100 TABLET ORAL 2 TIMES DAILY
Qty: 60 TABLET | Refills: 2 | Status: SHIPPED | OUTPATIENT
Start: 2024-10-22 | End: 2025-01-20

## 2024-10-22 RX ORDER — PREDNISONE 10 MG/1
TABLET ORAL
Qty: 30 TABLET | Refills: 0 | Status: SHIPPED | OUTPATIENT
Start: 2024-10-22

## 2024-10-22 NOTE — PROGRESS NOTES
Functionality Assessment/Goals Worksheet     On a scale of 0 (Does not Interfere) to 10 (Completely Interferes)     1.  Which number describes how during the past week pain has interfered with           the following:  A.  General Activity:  9  B.  Mood: 3  C.  Walking Ability:  6  D.  Normal Work (Includes both work outside the home and housework):  9  E.  Relations with Other People:   0  F.  Sleep:   7  G.  Enjoyment of Life:   7    2.  Patient Prefers to Take their Pain Medications:     []  On a regular basis   []  Only when necessary    [x]  Does not take pain medications    3.  What are the Patient's Goals/Expectations for Visiting Pain Management?     [x]  Learn about my pain    []  Receive Medication   []  Physical Therapy     []  Treat Depression   []  Receive Injections    []  Treat Sleep   []  Deal with Anxiety and Stress   []  Treat Opoid Dependence/Addiction   []  Other:                                
6 HOURS PRN       No Known Allergies      Review of Systems:   Constitutional: negative for weight changes or fevers  Genitourinary: negative for bowel/bladder incontinence   Musculoskeletal: positive low back pain  Neurological: negative for leg weakness.  Left foot/toes numbness/tingling  Behavioral/Psych: negative for anxiety/depression   All other systems reviewed and are negative    Objective:     Vitals:    10/22/24 0925   BP: 132/88       Constitutional: Pleasant, no acute distress   Head: Normocephalic, atraumatic   Eyes: Conjunctivae normal   Neck: Supple, symmetrical   Lungs: Normal respiratory effort, non-labored breathing   Cardiovascular: Limbs warm and well perfused   Abdomen: Non-protruded   Musculoskeletal: Muscle bulk symmetric, no atrophy, no gross deformities   · Lower Extremities: ROM WNL.   · Thorax: No paraspinal tenderness bilaterally. No scoliosis or kyphosis.   · Lumbar Spine: ROM limited. Lumbar paraspinals tender to palpation bilaterally. SLR neg bilaterally.  Positive facet loading above her fusion.  Neurological: Cranial nerves II-XII grossly intact.   · Gait - Antalgic gait. Ambulates without assistive device.   · Motor: 3/5 muscle strength in bilateral hip flexion, knee flexion, knee extension, ankle dorsiflexion, and ankle plantar flexion. Pain limited weakness.   · Sensory: LT sensation intact in lower limbs   · Reflexes: 2+ symmetrical in bilateral achilles, 2+ bilateral patellar, negative ankle clonus, downgoing babinski   Skin: No rashes or lesions present   Psychological: Cooperative, no exaggerated pain behaviors     Assessment:    Diagnosis Orders   1. Chronic pain syndrome        2. History of back surgery  orphenadrine (NORFLEX) 100 MG extended release tablet      3. Lumbar spondylosis  orphenadrine (NORFLEX) 100 MG extended release tablet      4. Bilateral myofascial pain  orphenadrine (NORFLEX) 100 MG extended release tablet        Ivon Russ is a 43 y.o.female

## 2024-11-09 DIAGNOSIS — Z98.890 HISTORY OF BACK SURGERY: ICD-10-CM

## 2024-11-09 DIAGNOSIS — M47.816 LUMBAR SPONDYLOSIS: ICD-10-CM

## 2024-11-11 RX ORDER — PREGABALIN 200 MG/1
200 CAPSULE ORAL NIGHTLY
Qty: 30 CAPSULE | Refills: 2 | OUTPATIENT
Start: 2024-11-11 | End: 2025-02-09

## 2024-11-14 DIAGNOSIS — Z98.890 HISTORY OF BACK SURGERY: ICD-10-CM

## 2024-11-14 DIAGNOSIS — M47.816 LUMBAR SPONDYLOSIS: ICD-10-CM

## 2024-11-15 RX ORDER — PREGABALIN 200 MG/1
200 CAPSULE ORAL NIGHTLY
Qty: 30 CAPSULE | Refills: 2 | Status: SHIPPED | OUTPATIENT
Start: 2024-11-15 | End: 2025-02-13

## 2024-11-15 NOTE — TELEPHONE ENCOUNTER
[place] reviewed. UDS: +no data in last yr.   Last seen: Visit date not found. Follow-up:   Future Appointments   Date Time Provider Department Center   2024 11:00 AM Joan Bales APRN - CNP N [facility]   1/3/2025  9:00 AM Shubham Bedolla PA-C N [facility]   2025  1:15 PM Harley Temple MD N [facility]   Lyrica last fill was  according to [place]AR[place] with no refill left. Was a script sent in for the Lyrica on  with 2 refills. Called pharmacy and they say script is  and needs new script

## 2024-11-25 ENCOUNTER — OFFICE VISIT (OUTPATIENT)
Dept: PHYSICAL MEDICINE AND REHAB | Age: 44
End: 2024-11-25
Payer: COMMERCIAL

## 2024-11-25 VITALS
SYSTOLIC BLOOD PRESSURE: 102 MMHG | DIASTOLIC BLOOD PRESSURE: 76 MMHG | BODY MASS INDEX: 31.89 KG/M2 | HEIGHT: 63 IN | WEIGHT: 180 LBS

## 2024-11-25 DIAGNOSIS — Z98.890 HISTORY OF BACK SURGERY: Primary | ICD-10-CM

## 2024-11-25 DIAGNOSIS — G89.4 CHRONIC PAIN SYNDROME: ICD-10-CM

## 2024-11-25 DIAGNOSIS — M53.3 SI (SACROILIAC) JOINT DYSFUNCTION: ICD-10-CM

## 2024-11-25 DIAGNOSIS — G95.0 SYRINX OF SPINAL CORD (HCC): ICD-10-CM

## 2024-11-25 DIAGNOSIS — M47.816 LUMBAR SPONDYLOSIS: ICD-10-CM

## 2024-11-25 DIAGNOSIS — M79.18 BILATERAL MYOFASCIAL PAIN: ICD-10-CM

## 2024-11-25 PROCEDURE — 1036F TOBACCO NON-USER: CPT | Performed by: NURSE PRACTITIONER

## 2024-11-25 PROCEDURE — G8417 CALC BMI ABV UP PARAM F/U: HCPCS | Performed by: NURSE PRACTITIONER

## 2024-11-25 PROCEDURE — G8484 FLU IMMUNIZE NO ADMIN: HCPCS | Performed by: NURSE PRACTITIONER

## 2024-11-25 PROCEDURE — G8427 DOCREV CUR MEDS BY ELIG CLIN: HCPCS | Performed by: NURSE PRACTITIONER

## 2024-11-25 PROCEDURE — 99214 OFFICE O/P EST MOD 30 MIN: CPT | Performed by: NURSE PRACTITIONER

## 2024-11-25 RX ORDER — PREGABALIN 100 MG/1
100 CAPSULE ORAL
Qty: 30 CAPSULE | Refills: 1 | Status: SHIPPED | OUTPATIENT
Start: 2024-11-25 | End: 2025-02-23

## 2024-11-25 RX ORDER — DICLOFENAC SODIUM 75 MG/1
75 TABLET, DELAYED RELEASE ORAL 2 TIMES DAILY PRN
Qty: 60 TABLET | Refills: 1 | Status: SHIPPED | OUTPATIENT
Start: 2024-11-25

## 2024-11-25 NOTE — PROGRESS NOTES
Functionality Assessment/Goals Worksheet     On a scale of 0 (Does not Interfere) to 10 (Completely Interferes)     1.  Which number describes how during the past week pain has interfered with           the following:  A.  General Activity:  7  B.  Mood: 2  C.  Walking Ability:  5  D.  Normal Work (Includes both work outside the home and housework):  2  E.  Relations with Other People:   0  F.  Sleep:   4  G.  Enjoyment of Life:   2    2.  Patient Prefers to Take their Pain Medications:     []  On a regular basis   [x]  Only when necessary    []  Does not take pain medications    3.  What are the Patient's Goals/Expectations for Visiting Pain Management?     [x]  Learn about my pain    []  Receive Medication   []  Physical Therapy     []  Treat Depression   []  Receive Injections    []  Treat Sleep   []  Deal with Anxiety and Stress   []  Treat Opoid Dependence/Addiction   []  Other:                                
symmetrical in bilateral achilles, 2+ bilateral patellar, negative ankle clonus, downgoing babinski   Skin: No rashes or lesions present   Psychological: Cooperative, no exaggerated pain behaviors     Assessment:    Diagnosis Orders   1. History of back surgery  pregabalin (LYRICA) 100 MG capsule      2. Lumbar spondylosis  pregabalin (LYRICA) 100 MG capsule      3. Bilateral myofascial pain  pregabalin (LYRICA) 100 MG capsule      4. Chronic pain syndrome  pregabalin (LYRICA) 100 MG capsule      5. SI (sacroiliac) joint dysfunction  pregabalin (LYRICA) 100 MG capsule      6. Syrinx of spinal cord (HCC)  pregabalin (LYRICA) 100 MG capsule          Ivon Russ is a 44 y.o.female presenting to the pain clinic for evaluation of low back pain.   She has completed physical therapy for posterior core stretching and strengthening but not noticed much improvement.  We discussed continuing these exercises after completion of dedicated physical therapy to prevent worsening of her spine.  I have continued Lyrica 200 mg nightly.  I started her on Lyrica 100 mg in the morning.  She responded extremely well to her first trigger point injection but had aggravated pain thereafter.    Patient advised to continue to follow-up with neurosurgery for further recommendations in regards to surgery, she has a follow-up in January.  I have continued her Norflex 100 mg twice daily.  I have advised her to take this at night but she can take up to twice daily if effective.  I also think it would be beneficial to have her on an NSAID.  I have started her on diclofenac 75 mg twice daily as needed.    Plan:   The following treatment recommendations and plan were discussed in detail with Ivon Russ.    Imaging:   I have reviewed patient’s imaging of lumbar and thoracic MRI and results were discussed with patient today.     Analgesics:   Patient is taking Acetaminophen. Patient informed that the maximum amount of acetaminophen taken on

## 2024-12-20 DIAGNOSIS — M47.816 LUMBAR SPONDYLOSIS: ICD-10-CM

## 2024-12-20 DIAGNOSIS — M53.3 SI (SACROILIAC) JOINT DYSFUNCTION: ICD-10-CM

## 2024-12-20 DIAGNOSIS — Z98.890 HISTORY OF BACK SURGERY: ICD-10-CM

## 2024-12-20 DIAGNOSIS — M79.18 BILATERAL MYOFASCIAL PAIN: ICD-10-CM

## 2024-12-20 DIAGNOSIS — G95.0 SYRINX OF SPINAL CORD (HCC): ICD-10-CM

## 2024-12-20 DIAGNOSIS — G89.4 CHRONIC PAIN SYNDROME: ICD-10-CM

## 2024-12-23 RX ORDER — PREGABALIN 100 MG/1
100 CAPSULE ORAL
Qty: 30 CAPSULE | Refills: 1 | Status: CANCELLED | OUTPATIENT
Start: 2024-12-25 | End: 2025-03-25

## 2024-12-27 DIAGNOSIS — Z98.890 HISTORY OF BACK SURGERY: ICD-10-CM

## 2024-12-27 DIAGNOSIS — M47.816 LUMBAR SPONDYLOSIS: ICD-10-CM

## 2024-12-30 RX ORDER — PREGABALIN 200 MG/1
200 CAPSULE ORAL NIGHTLY
Qty: 30 CAPSULE | Refills: 2 | OUTPATIENT
Start: 2024-12-30 | End: 2025-03-30

## 2025-01-03 ENCOUNTER — OFFICE VISIT (OUTPATIENT)
Dept: PHYSICAL MEDICINE AND REHAB | Age: 45
End: 2025-01-03

## 2025-01-03 VITALS
WEIGHT: 180 LBS | HEIGHT: 63 IN | BODY MASS INDEX: 31.89 KG/M2 | DIASTOLIC BLOOD PRESSURE: 72 MMHG | SYSTOLIC BLOOD PRESSURE: 128 MMHG

## 2025-01-03 DIAGNOSIS — Z98.890 HISTORY OF BACK SURGERY: ICD-10-CM

## 2025-01-03 DIAGNOSIS — G89.4 CHRONIC PAIN SYNDROME: Primary | ICD-10-CM

## 2025-01-03 DIAGNOSIS — M79.18 BILATERAL MYOFASCIAL PAIN: ICD-10-CM

## 2025-01-03 DIAGNOSIS — M47.816 LUMBAR SPONDYLOSIS: ICD-10-CM

## 2025-01-03 RX ORDER — ORPHENADRINE CITRATE 100 MG/1
100 TABLET ORAL 2 TIMES DAILY
Qty: 60 TABLET | Refills: 2 | Status: SHIPPED | OUTPATIENT
Start: 2025-01-03 | End: 2025-04-03

## 2025-01-03 NOTE — PROGRESS NOTES
[facility] PROFESSIONAL SERVS  [facility] NEUROSCIENCE AND REHABILITATION [address]  [place] [id number]  Dept: [phone]  Dept Fax: [phone]  Loc: [phone]    Visit Date: 1/3/2025    Functionality Assessment/Goals Worksheet     On a scale of 0 (Does not Interfere) to 10 (Completely Interferes)     1.  Which number describes how during the past week pain has interfered with       the following:  A.  General Activity:  7  B.  Mood: 2  C.  Walking Ability:  7  D.  Normal Work (Includes both work outside the home and housework):  7  E.  Relations with Other People:   2  F.  Sleep:   5  G.  Enjoyment of Life:   5    2.  Patient Prefers to Take their Pain Medications:     []  On a regular basis   []  Only when necessary    [x]  Does not take pain medications    3.  What are the Patient's Goals/Expectations for Visiting Pain Management?     []  Learn about my pain    []  Receive Medication   []  Physical Therapy     []  Treat Depression   []  Receive Injections    []  Treat Sleep   []  Deal with Anxiety and Stress   []  Treat Opoid Dependence/Addiction   [x]  Other:pain free  
performed by Cristian Meade DO at [facility] OR     SECTION      times 4    KNEE SURGERY Left     times 2    LUMBAR DISC SURGERY  2023    with fusion    LUMBAR FUSION N/A 2023    LUMBAR DISCECTOMY OF L5-S1 WITH CONCURRENT POSTERIOR LUMBAR DECOMPRESSION AND INSTRUMENTED FUSION OF L5-S1 WITH INTERBODY IMPLANTS performed by Juanpablo Kee MD at [facility] OR    PAIN MANAGEMENT PROCEDURE Bilateral 2023    Lumbar 4/5, 5/Sacral 1 medial branch blocks bilateral performed by Cristian Meade DO at [facility] [place] OR    TUBAL LIGATION      URETHRAL SURGERY N/A 2023    Cystoscopy, Mid Urethral Sling Placement performed by Malcolm Dave MD at [facility] OR       Family History   Problem Relation Age of Onset    No Known Problems Mother     No Known Problems Father        Medications & Allergies:   Current Outpatient Medications   Medication Instructions    aspirin 81 mg, Oral, EVERY 6 HOURS PRN    BREZTRI AEROSPHERE 160-9-4.8 MCG/ACT AERO 2 puffs, Inhalation, 2 TIMES DAILY    busPIRone (BUSPAR) 5 mg, Oral, 3 TIMES DAILY    diclofenac (VOLTAREN) 75 mg, Oral, 2 TIMES DAILY PRN    HYDROcodone-acetaminophen (NORCO) 5-325 MG per tablet 1 tablet, Oral, EVERY 6 HOURS PRN    ibuprofen (ADVIL;MOTRIN) 800 mg, Oral, EVERY 6 HOURS PRN    lisinopril (PRINIVIL;ZESTRIL) 5 mg, Oral, DAILY    ondansetron (ZOFRAN) 4 mg, Oral, 3 TIMES DAILY PRN    orphenadrine (NORFLEX) 100 mg, Oral, 2 TIMES DAILY    pregabalin (LYRICA) 200 mg, Oral, Nightly    pregabalin (LYRICA) 100 mg, Oral, DAILY BEFORE BREAKFAST    rizatriptan (MAXALT) 5 MG tablet 1 tablet, Oral, DAILY PRN    traMADol-acetaminophen (ULTRACET) 37.5-325 MG per tablet 1 tablet, Oral, EVERY 6 HOURS PRN       No Known Allergies      Review of Systems:   Constitutional: negative for weight changes or fevers  Genitourinary: negative for bowel/bladder incontinence   Musculoskeletal: positive low back pain  Neurological: negative for leg weakness.  Left foot/toes

## 2025-01-08 DIAGNOSIS — M51.369 ADJACENT SEGMENT DISEASE OF LUMBAR SPINE WITH HISTORY OF FUSION PROCEDURE: ICD-10-CM

## 2025-01-08 DIAGNOSIS — Z98.1 ADJACENT SEGMENT DISEASE OF LUMBAR SPINE WITH HISTORY OF FUSION PROCEDURE: ICD-10-CM

## 2025-01-08 DIAGNOSIS — M51.26 HNP (HERNIATED NUCLEUS PULPOSUS), LUMBAR: ICD-10-CM

## 2025-01-08 DIAGNOSIS — Z98.1 STATUS POST LUMBAR SPINAL FUSION: Primary | ICD-10-CM

## 2025-01-20 ENCOUNTER — OFFICE VISIT (OUTPATIENT)
Dept: ALLERGY | Age: 45
End: 2025-01-20
Payer: COMMERCIAL

## 2025-01-20 VITALS — RESPIRATION RATE: 18 BRPM | BODY MASS INDEX: 33.66 KG/M2 | HEIGHT: 63 IN | WEIGHT: 190 LBS

## 2025-01-20 DIAGNOSIS — R05.3 CHRONIC COUGH: ICD-10-CM

## 2025-01-20 DIAGNOSIS — J30.89 NON-SEASONAL ALLERGIC RHINITIS, UNSPECIFIED TRIGGER: ICD-10-CM

## 2025-01-20 DIAGNOSIS — J45.40 MODERATE PERSISTENT ASTHMA WITHOUT COMPLICATION: Primary | ICD-10-CM

## 2025-01-20 DIAGNOSIS — R06.02 SHORTNESS OF BREATH: ICD-10-CM

## 2025-01-20 PROCEDURE — 99417 PROLNG OP E/M EACH 15 MIN: CPT | Performed by: NURSE PRACTITIONER

## 2025-01-20 PROCEDURE — A4617 MOUTH PIECE: HCPCS | Performed by: NURSE PRACTITIONER

## 2025-01-20 PROCEDURE — G8427 DOCREV CUR MEDS BY ELIG CLIN: HCPCS | Performed by: NURSE PRACTITIONER

## 2025-01-20 PROCEDURE — G8417 CALC BMI ABV UP PARAM F/U: HCPCS | Performed by: NURSE PRACTITIONER

## 2025-01-20 PROCEDURE — 99215 OFFICE O/P EST HI 40 MIN: CPT | Performed by: NURSE PRACTITIONER

## 2025-01-20 PROCEDURE — 95012 NITRIC OXIDE EXP GAS DETER: CPT | Performed by: NURSE PRACTITIONER

## 2025-01-20 PROCEDURE — 1036F TOBACCO NON-USER: CPT | Performed by: NURSE PRACTITIONER

## 2025-01-20 RX ORDER — LISINOPRIL 5 MG/1
5 TABLET ORAL DAILY PRN
COMMUNITY

## 2025-01-20 RX ORDER — ALBUTEROL SULFATE 0.83 MG/ML
2.5 SOLUTION RESPIRATORY (INHALATION) 4 TIMES DAILY PRN
Qty: 120 EACH | Refills: 3 | Status: SHIPPED | OUTPATIENT
Start: 2025-01-20

## 2025-01-20 RX ORDER — ALBUTEROL SULFATE AND BUDESONIDE 90; 80 UG/1; UG/1
2 AEROSOL, METERED RESPIRATORY (INHALATION)
Qty: 10.7 G | Refills: 2 | Status: SHIPPED | OUTPATIENT
Start: 2025-01-20 | End: 2025-01-20

## 2025-01-20 RX ORDER — LEVALBUTEROL TARTRATE 45 UG/1
2 AEROSOL, METERED ORAL EVERY 4 HOURS PRN
Qty: 1 EACH | Refills: 2 | Status: SHIPPED | OUTPATIENT
Start: 2025-01-20 | End: 2025-01-22 | Stop reason: ALTCHOICE

## 2025-01-20 ASSESSMENT — ENCOUNTER SYMPTOMS: SHORTNESS OF BREATH: 1

## 2025-01-20 NOTE — PATIENT INSTRUCTIONS
GET LABS COMPLETED ASAP PLEASE AND AT LEAST 2 WEEKS PRIOR TO ALLERGY TESTING. LABS ARE NON-FASTING.    PATIENT TO RETURN FOR ALLERGY TESTING    STOP THE FOLLOWING MEDICATIONS (IF TAKING) ONE WEEK PRIOR TO ALLERGY TESTIN. ANTIHISTAMINES - ZYRTEC (CETIRIZINE), CLARITIN (LORATADINE), XYZAL (LEVOCETIRIZINE), BENADRYL (DIPHENHYDRAMINE), HYDROXYZINE, VISTARIL, ALLEGRA (FEXOFENADINE), DOXYALAMINE  2. STOP NASAL SPRAYS/RINSES - NASOCORT, FLONASE, NASONEX, ASTELIN, ANTIVERT (MECLIZINE),BUDESONIDE, XHANCE  3. STOP STOMACH MEDICATIONS - PEPCID, FAMOTIDINE, ZANTAC, RANITIDINE  4. STOP SINGULAIR (MONTELUKAST)  5. IF POSSIBLE HOLD MAINTENANCE INHALERS THE DAY OF TESTING BUT BRING WITH YOU TO USE AFTER THE ALLERGY TESTING. YOU CAN USE YOUR RESCUE INHALER - ALBUTEROL AS DIRECTED. IF YOU HAVE A HARD TIME BREATHING PLEASE USE ALL INHALERS AS DIRECTED.  WE DO NOT WANT YOU TO HAVE DIFFICULTY BREATHING!  6. ELAVIL (AMITRIPTYLINE) - MUST DISCUSS WITH ANA PAULA ALCALA OR PCP PRIOR TO HOLDING    FOLLOWING ALLERGY TESTING YOU MAY RESUME YOUR ALLERGY AND/OR MAINTENANCE ASTHMA MEDICATIONS    IF YOU ARE PLACED ON ANY NEW MEDICATIONS BETWEEN NOW AND THE TIME OF YOUR ALLERGY TESTING BY ANY OTHER PROVIDER CALL THE OFFICE TO VERIFY IF THIS WILL INTERFERE WITH ALLERGY TESTING    PLEASE SHOWER THE MORNING OF ALLERGY TESTING.  THIS IS VERY IMPORTANT FOR TESTING AND INFECTION CONTROL PRIOR TO SKIN ALLERGY TESTING    IF ANY LABS, RADIOLOGY EXMAS, OR PULMONARY TESTS ARE ORDERED, YOU WILL BE NOTIFIED OF ANY ABNORMAL RESULTS IF WE ARE CONCERED.  OTHERWISE LABS WILL BE REVIEWED AT YOUR NEXT VISIT.    How To Prepare For Your Allergy Test    As you embark on your allergy testing, there are several important things to remember.  Your testing process may take 60-90 minutes, but will result in information on which allergens you react to and how severely you react to each one.  Be sure and inform your provider and staff if you are not feeling well or have

## 2025-01-20 NOTE — PROGRESS NOTES
Allergy & Asthma   [address] [place] [address]  Ph:[phone]  Fax: [phone]     Diagnosis Orders   1. Moderate persistent asthma without complication  albuterol (PROVENTIL) (2.5 MG/3ML) 0.083% nebulizer solution    Full PFT Study With Bronchodilator    IgA    CBC with Auto Differential    IgE    IgG    IgM    Strongyloides Ab, IgG    MOUTH PIECE    NITRIC OXIDE  GAS DETERMINATION    CT CHEST WO CONTRAST    Albuterol-Budesonide (AIRSUPRA) 90-80 MCG/ACT AERO      2. Chronic cough  MOUTH PIECE    NITRIC OXIDE  GAS DETERMINATION    CT CHEST WO CONTRAST    Albuterol-Budesonide (AIRSUPRA) 90-80 MCG/ACT AERO      3. Shortness of breath  MOUTH PIECE    NITRIC OXIDE  GAS DETERMINATION    CT CHEST WO CONTRAST    Albuterol-Budesonide (AIRSUPRA) 90-80 MCG/ACT AERO      4. Non-seasonal allergic rhinitis, unspecified trigger            Chief Complaint: No chief complaint on file.      HISTORY OF PRESENT ILLNESS: NEW PATIENT TO PRACTICE    Ivon Russ is a 44 y.o. female here today for asthma.  Patient is as a new  Patient does complain of asthma symptoms. When patient has exacerbation symptoms include dyspnea on exertion.  Patient has had asthma for  years.  Patient does and does not have a rescue inhaler which they do not use frequently. The previous asthma exacerbation occurred 5 weeks ago. The patient reports adherence to medication regimen. Therapies; meds asthma: inhaled corticosteroids. Denies nausea, vomiting, and fever today.  Rates asthma as uncontrolled using the current medication regime. Patient was on Breztri which was effective.  However she does not have refills at this time.  Recently she thought she had pneumonia. Saw her PCP who gave her nebulizer treatments.  Patient does not currently have a rescue inhaler.  She states that she has had albuterol in the past and it does not work.  She complains of tachycardia with it.  She states that she does already see a

## 2025-01-22 ENCOUNTER — HOSPITAL ENCOUNTER (OUTPATIENT)
Dept: CT IMAGING | Age: 45
Discharge: HOME | End: 2025-01-22
Payer: COMMERCIAL

## 2025-01-22 ENCOUNTER — HOSPITAL ENCOUNTER (OUTPATIENT)
Dept: GENERAL RADIOLOGY | Age: 45
Discharge: HOME | End: 2025-01-22
Payer: COMMERCIAL

## 2025-01-22 VITALS
TEMPERATURE: 97 F | SYSTOLIC BLOOD PRESSURE: 155 MMHG | WEIGHT: 190 LBS | DIASTOLIC BLOOD PRESSURE: 90 MMHG | HEART RATE: 71 BPM | RESPIRATION RATE: 18 BRPM | BODY MASS INDEX: 33.66 KG/M2 | OXYGEN SATURATION: 99 %

## 2025-01-22 DIAGNOSIS — M51.369 ADJACENT SEGMENT DISEASE OF LUMBAR SPINE WITH HISTORY OF FUSION PROCEDURE: ICD-10-CM

## 2025-01-22 DIAGNOSIS — Z98.1 ADJACENT SEGMENT DISEASE OF LUMBAR SPINE WITH HISTORY OF FUSION PROCEDURE: ICD-10-CM

## 2025-01-22 DIAGNOSIS — M51.26 HNP (HERNIATED NUCLEUS PULPOSUS), LUMBAR: ICD-10-CM

## 2025-01-22 DIAGNOSIS — Z98.1 STATUS POST LUMBAR SPINAL FUSION: ICD-10-CM

## 2025-01-22 PROCEDURE — 62304 MYELOGRAPHY LUMBAR INJECTION: CPT

## 2025-01-22 PROCEDURE — 6360000004 HC RX CONTRAST MEDICATION: Performed by: NEUROLOGICAL SURGERY

## 2025-01-22 PROCEDURE — 72132 CT LUMBAR SPINE W/DYE: CPT

## 2025-01-22 RX ORDER — ONDANSETRON 2 MG/ML
4 INJECTION INTRAMUSCULAR; INTRAVENOUS EVERY 6 HOURS PRN
Status: DISCONTINUED | OUTPATIENT
Start: 2025-01-22 | End: 2025-01-23 | Stop reason: HOSPADM

## 2025-01-22 RX ADMIN — IOHEXOL 10 ML: 180 INJECTION INTRAVENOUS at 11:47

## 2025-01-22 ASSESSMENT — PAIN SCALES - GENERAL: PAINLEVEL_OUTOF10: 8

## 2025-01-22 ASSESSMENT — PAIN DESCRIPTION - LOCATION: LOCATION: BACK

## 2025-01-22 ASSESSMENT — PAIN - FUNCTIONAL ASSESSMENT: PAIN_FUNCTIONAL_ASSESSMENT: 0-10

## 2025-01-22 ASSESSMENT — PAIN DESCRIPTION - ORIENTATION: ORIENTATION: LOWER

## 2025-01-22 NOTE — DISCHARGE INSTRUCTIONS
MYELOGRAM DISCHARGE INSTRUCTION SHEET    Diet:   Regular diet as tolerated.    Encourage fluids - at least 8 ounces every 1 hour x 4, then 8 ounces every 2 hours until bedtime.    Activity: May walk around the house but not outside today.    Rest at home for remainder of today.    When lying down, the head and upper body must remain elevated 45 degrees relative to the lower body.      Sleep with 2 - 3 pillows.    Lying flat is not allowed until tomorrow.    No driving today.    Call your doctor if you should develop any headache, unrelieved vomiting, pain, weakness or numbness of extremities or go to the nearest emergency room.  Keep scheduled office appointment with your doctor.

## 2025-01-22 NOTE — PROGRESS NOTES
Patient admitted to room 04, vitals are stable. Patient offered rights and responsibilities.     _m___ Safety:       (Environmental)  [place] to environment  Ensure ID band is correct and in place/ allergy band as needed  Assess for fall risk  Initiate fall precautions as applicable (fall band, side rails, etc.)  Call light within reach  Bed in low position/ wheels locked    __m__ Pain:       Assess pain level and characteristics  Administer analgesics as ordered  Assess effectiveness of pain management and report to MD as needed    _m___ Knowledge Deficit:  Assess baseline knowledge  Provide teaching at level of understanding  Provide teaching via preferred learning method  Evaluate teaching effectiveness    __m__ Hemodynamic/Respiratory Status:       (Pre and Post Procedure Monitoring)  Assess/Monitor vital signs and LOC  Assess Baseline SpO2 prior to any sedation  Obtain weight/height  Assess vital signs/ LOC until patient meets discharge criteria  Monitor procedure site and notify MD of any issues

## 2025-01-23 RX ORDER — DICLOFENAC SODIUM 75 MG/1
75 TABLET, DELAYED RELEASE ORAL 2 TIMES DAILY
Qty: 60 TABLET | Refills: 2 | Status: SHIPPED | OUTPATIENT
Start: 2025-01-23 | End: 2025-04-23

## 2025-01-29 ENCOUNTER — TELEPHONE (OUTPATIENT)
Dept: NEUROSURGERY | Age: 45
End: 2025-01-29

## 2025-01-29 DIAGNOSIS — M51.369 ADJACENT SEGMENT DISEASE OF LUMBAR SPINE WITH HISTORY OF FUSION PROCEDURE: Primary | ICD-10-CM

## 2025-01-29 DIAGNOSIS — Z98.1 ADJACENT SEGMENT DISEASE OF LUMBAR SPINE WITH HISTORY OF FUSION PROCEDURE: Primary | ICD-10-CM

## 2025-01-29 DIAGNOSIS — M54.16 LUMBAR RADICULOPATHY: ICD-10-CM

## 2025-01-29 NOTE — TELEPHONE ENCOUNTER
Pt called stating she was running a fever this morning and is still having bad HA from last procedure. Pt expressed she has a dry cough and not sure if its related to the fever and HA. Pt asking if she should still have her procedure tomorrow or reschedule. Writer suggested pt call radiology to help with making that decision. Pt verbalized understanding and asked to be transferred to radiology. KL

## 2025-02-04 DIAGNOSIS — Z98.890 HISTORY OF BACK SURGERY: ICD-10-CM

## 2025-02-04 DIAGNOSIS — M47.816 LUMBAR SPONDYLOSIS: ICD-10-CM

## 2025-02-05 NOTE — TELEPHONE ENCOUNTER
Looking at [facility] the Rx I sent in Lyrica 200 mg nightly September and November has 2 refills that have not been filled yet. Verify with pharmacy.     She is taking Lyrica 100 mg in the morning. That was sent in November with 1 refill so she should have 1 refill left on that one as well.

## 2025-02-06 RX ORDER — PREGABALIN 200 MG/1
200 CAPSULE ORAL NIGHTLY
Qty: 30 CAPSULE | Refills: 2 | OUTPATIENT
Start: 2025-02-06 | End: 2025-05-07

## 2025-02-10 ENCOUNTER — HOSPITAL ENCOUNTER (OUTPATIENT)
Dept: INTERVENTIONAL RADIOLOGY/VASCULAR | Age: 45
Discharge: HOME | End: 2025-02-10
Payer: COMMERCIAL

## 2025-02-10 ENCOUNTER — HOSPITAL ENCOUNTER (OUTPATIENT)
Dept: CT IMAGING | Age: 45
Discharge: HOME | End: 2025-02-10
Payer: COMMERCIAL

## 2025-02-10 VITALS
RESPIRATION RATE: 22 BRPM | SYSTOLIC BLOOD PRESSURE: 125 MMHG | DIASTOLIC BLOOD PRESSURE: 84 MMHG | OXYGEN SATURATION: 100 % | TEMPERATURE: 97.3 F | HEART RATE: 78 BPM

## 2025-02-10 DIAGNOSIS — M54.16 LUMBAR RADICULOPATHY: ICD-10-CM

## 2025-02-10 DIAGNOSIS — Z98.1 STATUS POST LUMBAR SPINAL FUSION: ICD-10-CM

## 2025-02-10 DIAGNOSIS — M51.369 ADJACENT SEGMENT DISEASE OF LUMBAR SPINE WITH HISTORY OF FUSION PROCEDURE: ICD-10-CM

## 2025-02-10 DIAGNOSIS — Z98.1 ADJACENT SEGMENT DISEASE OF LUMBAR SPINE WITH HISTORY OF FUSION PROCEDURE: ICD-10-CM

## 2025-02-10 DIAGNOSIS — M51.26 HNP (HERNIATED NUCLEUS PULPOSUS), LUMBAR: ICD-10-CM

## 2025-02-10 PROCEDURE — 62290 NJX PX DISCOGRAPHY LUMBAR: CPT

## 2025-02-10 PROCEDURE — 72132 CT LUMBAR SPINE W/DYE: CPT

## 2025-02-10 PROCEDURE — 2709999900 IR DISCOGRAPHY LUMBAR S&I

## 2025-02-10 PROCEDURE — 2500000003 HC RX 250 WO HCPCS: Performed by: RADIOLOGY

## 2025-02-10 PROCEDURE — 6360000002 HC RX W HCPCS: Performed by: RADIOLOGY

## 2025-02-10 PROCEDURE — 2580000003 HC RX 258: Performed by: RADIOLOGY

## 2025-02-10 PROCEDURE — 6360000004 HC RX CONTRAST MEDICATION: Performed by: RADIOLOGY

## 2025-02-10 PROCEDURE — 72295 X-RAY OF LOWER SPINE DISK: CPT

## 2025-02-10 RX ORDER — SODIUM CHLORIDE 9 MG/ML
INJECTION, SOLUTION INTRAVENOUS CONTINUOUS
Status: DISCONTINUED | OUTPATIENT
Start: 2025-02-10 | End: 2025-02-11 | Stop reason: HOSPADM

## 2025-02-10 RX ORDER — MIDAZOLAM HYDROCHLORIDE 1 MG/ML
1 INJECTION, SOLUTION INTRAMUSCULAR; INTRAVENOUS ONCE
Status: COMPLETED | OUTPATIENT
Start: 2025-02-10 | End: 2025-02-10

## 2025-02-10 RX ORDER — FENTANYL CITRATE 50 UG/ML
50 INJECTION, SOLUTION INTRAMUSCULAR; INTRAVENOUS ONCE
Status: COMPLETED | OUTPATIENT
Start: 2025-02-10 | End: 2025-02-10

## 2025-02-10 RX ORDER — SODIUM CHLORIDE 450 MG/100ML
INJECTION, SOLUTION INTRAVENOUS CONTINUOUS
Status: DISCONTINUED | OUTPATIENT
Start: 2025-02-10 | End: 2025-02-10

## 2025-02-10 RX ADMIN — MIDAZOLAM 1 MG: 1 INJECTION INTRAMUSCULAR; INTRAVENOUS at 09:30

## 2025-02-10 RX ADMIN — IOHEXOL 5 ML: 180 INJECTION INTRAVENOUS at 10:12

## 2025-02-10 RX ADMIN — WATER 2000 MG: 1 INJECTION INTRAMUSCULAR; INTRAVENOUS; SUBCUTANEOUS at 08:36

## 2025-02-10 RX ADMIN — FENTANYL CITRATE 50 MCG: 50 INJECTION, SOLUTION INTRAMUSCULAR; INTRAVENOUS at 09:30

## 2025-02-10 RX ADMIN — SODIUM CHLORIDE: 9 INJECTION, SOLUTION INTRAVENOUS at 08:35

## 2025-02-10 ASSESSMENT — PAIN DESCRIPTION - FREQUENCY: FREQUENCY: CONTINUOUS

## 2025-02-10 ASSESSMENT — PAIN DESCRIPTION - DESCRIPTORS: DESCRIPTORS: ACHING

## 2025-02-10 ASSESSMENT — PAIN SCALES - GENERAL
PAINLEVEL_OUTOF10: 0
PAINLEVEL_OUTOF10: 2

## 2025-02-10 ASSESSMENT — PAIN DESCRIPTION - PAIN TYPE: TYPE: CHRONIC PAIN

## 2025-02-10 ASSESSMENT — PAIN DESCRIPTION - ORIENTATION: ORIENTATION: LOWER

## 2025-02-10 ASSESSMENT — PAIN DESCRIPTION - LOCATION: LOCATION: BACK

## 2025-02-10 NOTE — PROGRESS NOTES
1055: Patient back from procedure-tolerated well. States her lower back/buttocks/left leg to foot numbness/tingling. Pain rating 0/10 at this time. Bandaid clean, dry, intact. No redness or edema noted around site. Vitals as charted.     1115: Bandaid clean, dry, intact. No redness or edema noted around site. Vitals as charted.     1130: Bandaid clean, dry, intact. No redness or edema noted around site. Vitals as charted.     1145: Bandaid clean, dry, intact. No redness or edema noted around site. Vitals as charted. Patient still stating numbness/tingling in buttocks/lower back and left leg but states it is a lot better. Assisted to bedside commode 3 assist and did well.     1200:

## 2025-02-10 NOTE — PROGRESS NOTES
0800: Patient arrived ambulatory for discogram. Patient states she does not take any blood thinners and has been NPO since last night. Procedure explained to patient- she is very nervous but voices understanding. Patient rights and responsibilities offered to patient.     IV in place.   Ancef administered- patient tolerated well.   Resting in bed, call light in reach.                   _m___ Safety:       (Environmental)  [place] to environment  Ensure ID band is correct and in place/ allergy band as needed  Assess for fall risk  Initiate fall precautions as applicable (fall band, side rails, etc.)  Call light within reach  Bed in low position/ wheels locked    _m___ Pain:       Assess pain level and characteristics  Administer analgesics as ordered  Assess effectiveness of pain management and report to MD as needed    _m___ Knowledge Deficit:  Assess baseline knowledge  Provide teaching at level of understanding  Provide teaching via preferred learning method  Evaluate teaching effectiveness    _m___ Hemodynamic/Respiratory Status:       (Pre and Post Procedure Monitoring)  Assess/Monitor vital signs and LOC  Assess Baseline SpO2 prior to any sedation  Obtain weight/height  Assess vital signs/ LOC until patient meets discharge criteria  Monitor procedure site and notify MD of any issues

## 2025-02-10 NOTE — OP NOTE
Department of Radiology  Post Procedure Progress Note      Pre-Procedure Diagnosis:  back pain     Procedure Performed:  discogram     Anesthesia: local / versed and fentanyl    Findings: see report     Immediate Complications:  None    Estimated Blood Loss: minimal    SEE DICTATED PROCEDURE NOTE FOR COMPLETE DETAILS.    Sb Reese MD   2/10/2025 10:14 AM

## 2025-02-10 NOTE — H&P
Formulation and discussion of sedation / procedure plans, risks, benefits, side effects and alternatives with patient and/or responsible adult completed.    History and Physical reviewed and unchanged.    Electronically signed by Sb Reese MD on 2/10/25 at 10:12 AM EST

## 2025-02-10 NOTE — DISCHARGE INSTRUCTIONS
DISCOGRAM DISCHARGE INSTRUCTION    When lying down, the head and upper body must remain elevated 45 degrees relative to the lower body.    Sleep with 2 - 3 pillows.  If you get a headache that will not go away lay flat.     1.  Take it easy and rest the remainder of the day.  2.  Do not drive for the remainder of the day.  3.  You may use ice on the area of the injection to help alleviate discomfort.  Avoid heat for the remainder of the day.  4.  Do not soak in water or use a tub bath or hot tub for the remainder of the day.  5.  You may resume normal eating and drinking.  6. This evening you may resume your pain medications and any other medications you had stopped prior to the injection.  7.  Resume activities starting tomorrow in gradual manner.  You may resume physical therapy.  8. Follow up with ordering doctor if you continue to have pain.             9.  If you do have another nerve block ordered, follow instructions below:  Bring someone to drive you home.  Nothing to eat or drink 2 hours prior.  No blood thinners or aspirin products 5 days prior.      10.  Notify Radiology ([phone]), or go to the emergency room immediately if you develop any of the following symptoms: fever, chills, changes in mental status, severe pain, difficulty breathing, prolonged, severe headache, numbness or weakness in your arms or legs, loss of control of your bladder or bowel, excessive redness, swelling, or drainage from the area of injection.                    SEDATION/ANALGESIA INFORMATION HOME GOING ADVICE    Review the following information with the patient prior to the procedure.  Sedation/agalgesia is used during short medical procedures under controlled supervision.  The medication will produce a strong relaxation.  You will be able to hear, speak and follow instructions, but you memory and alertness will be decreased.  You will be able to swallow and breathe on your own.  During sedation/analgesia you blood

## 2025-02-10 NOTE — PROGRESS NOTES
0910 Patient received in IR for discogram.   0915 This procedure has been fully reviewed with the patient and written informed consent has been obtained.   0938 Procedure started with Dr. Reese.  1010 Procedure completed; patient tolerated well. Dressing to back; no bleeding noted.  1020 Patient on cart; comfort ensured.  1022 Patient taken to CT scanning for post imaging via cart. Pt alert and oriented x3; follows commands. Skin pink, warm, and dry. Respirations easy, regular, and nonlabored.

## 2025-02-10 NOTE — H&P
[facility]  Sedation/Analgesia History & Physical    Pt Name: Ivon Russ  MRN: [id number]  YOB: 1980  Provider Performing Procedure: Sb Reese MD, MD  Primary Care Physician: Katherine Peterson MD    Formulation and discussion of sedation / procedure plans, risks, benefits, side effects and alternatives with patient and/or responsible adult completed.    PRE-PROCEDURE   DNR-CCA/DNR-CC []Yes [x]No  Brief History/Pre-Procedure Diagnosis: back pain          MEDICAL HISTORY  []CAD/Valve  []Liver Disease  []Lung Disease []Diabetes  []Hypertension []Renal Disease  []Additional information:       has a past medical history of Asthma, Depression, High blood pressure, Irregular heart rate, and Migraines.    SURGICAL HISTORY   has a past surgical history that includes  section; knee surgery (Left); Tubal ligation; Back Injection (Bilateral, 2022); Pain management procedure (Bilateral, 2023); lumbar fusion (N/A, 2023); Lumbar disc surgery (2023); and Urethral Surgery (N/A, 2023).  Additional information:       ALLERGIES   Allergies as of 02/10/2025    (No Known Allergies)     Additional information:       MEDICATIONS   Coumadin Use Last 5 Days [x]No []Yes  Antiplatelet drug therapy use last 5 days  [x]No []Yes  Other anticoagulant use last 5 days  [x]No []Yes    Current Outpatient Medications:     diclofenac (VOLTAREN) 75 MG EC tablet, Take 1 tablet by mouth 2 times daily, Disp: 60 tablet, Rfl: 2    albuterol (PROVENTIL) (2.5 MG/3ML) 0.083% nebulizer solution, Take 3 mLs by nebulization 4 times daily as needed for Wheezing, Disp: 120 each, Rfl: 3    lisinopril (PRINIVIL;ZESTRIL) 5 MG tablet, Take 1 tablet by mouth daily as needed (PCP wants her to take if BP goes above 140?), Disp: , Rfl:     omeprazole (PRILOSEC) 20 MG delayed release capsule, TAKE 30 MINUTES BEFORE A MEAL, Disp: 30 capsule, Rfl: 2    orphenadrine (NORFLEX) 100 MG extended release

## 2025-02-27 DIAGNOSIS — Z98.890 HISTORY OF BACK SURGERY: ICD-10-CM

## 2025-02-27 DIAGNOSIS — M47.816 LUMBAR SPONDYLOSIS: ICD-10-CM

## 2025-02-27 RX ORDER — PREGABALIN 200 MG/1
200 CAPSULE ORAL NIGHTLY
Qty: 30 CAPSULE | Refills: 2 | OUTPATIENT
Start: 2025-02-27 | End: 2025-05-28

## 2025-03-03 DIAGNOSIS — M53.3 SI (SACROILIAC) JOINT DYSFUNCTION: ICD-10-CM

## 2025-03-03 DIAGNOSIS — M79.18 BILATERAL MYOFASCIAL PAIN: ICD-10-CM

## 2025-03-03 DIAGNOSIS — G89.4 CHRONIC PAIN SYNDROME: ICD-10-CM

## 2025-03-03 DIAGNOSIS — Z98.890 HISTORY OF BACK SURGERY: ICD-10-CM

## 2025-03-03 DIAGNOSIS — M47.816 LUMBAR SPONDYLOSIS: ICD-10-CM

## 2025-03-03 DIAGNOSIS — G95.0 SYRINX OF SPINAL CORD (HCC): ICD-10-CM

## 2025-03-04 RX ORDER — PREGABALIN 100 MG/1
100 CAPSULE ORAL
Qty: 30 CAPSULE | Refills: 2 | Status: SHIPPED | OUTPATIENT
Start: 2025-03-04 | End: 2025-06-02

## 2025-03-04 RX ORDER — PREGABALIN 200 MG/1
200 CAPSULE ORAL NIGHTLY
Qty: 30 CAPSULE | Refills: 2 | Status: SHIPPED | OUTPATIENT
Start: 2025-03-04 | End: 2025-06-02

## 2025-03-12 DIAGNOSIS — M79.18 BILATERAL MYOFASCIAL PAIN: ICD-10-CM

## 2025-03-12 DIAGNOSIS — M47.816 LUMBAR SPONDYLOSIS: ICD-10-CM

## 2025-03-12 DIAGNOSIS — Z98.890 HISTORY OF BACK SURGERY: ICD-10-CM

## 2025-03-13 RX ORDER — ORPHENADRINE CITRATE 100 MG/1
100 TABLET ORAL 2 TIMES DAILY
Qty: 60 TABLET | Refills: 2 | OUTPATIENT
Start: 2025-03-13 | End: 2025-06-11

## 2025-03-14 ENCOUNTER — OFFICE VISIT (OUTPATIENT)
Dept: NEUROSURGERY | Age: 45
End: 2025-03-14
Payer: COMMERCIAL

## 2025-03-14 VITALS
HEIGHT: 63 IN | SYSTOLIC BLOOD PRESSURE: 130 MMHG | DIASTOLIC BLOOD PRESSURE: 82 MMHG | BODY MASS INDEX: 33.66 KG/M2 | HEART RATE: 83 BPM | WEIGHT: 190 LBS

## 2025-03-14 DIAGNOSIS — M96.1 FAILED BACK SYNDROME: ICD-10-CM

## 2025-03-14 DIAGNOSIS — M54.16 LUMBAR RADICULOPATHY: ICD-10-CM

## 2025-03-14 DIAGNOSIS — M51.369 ADJACENT SEGMENT DISEASE OF LUMBAR SPINE WITH HISTORY OF FUSION PROCEDURE: ICD-10-CM

## 2025-03-14 DIAGNOSIS — Z98.1 ADJACENT SEGMENT DISEASE OF LUMBAR SPINE WITH HISTORY OF FUSION PROCEDURE: ICD-10-CM

## 2025-03-14 DIAGNOSIS — M51.26 HNP (HERNIATED NUCLEUS PULPOSUS), LUMBAR: Primary | ICD-10-CM

## 2025-03-14 PROCEDURE — 1036F TOBACCO NON-USER: CPT | Performed by: NURSE PRACTITIONER

## 2025-03-14 PROCEDURE — G8427 DOCREV CUR MEDS BY ELIG CLIN: HCPCS | Performed by: NURSE PRACTITIONER

## 2025-03-14 PROCEDURE — 99214 OFFICE O/P EST MOD 30 MIN: CPT | Performed by: NURSE PRACTITIONER

## 2025-03-14 PROCEDURE — G8417 CALC BMI ABV UP PARAM F/U: HCPCS | Performed by: NURSE PRACTITIONER

## 2025-03-14 ASSESSMENT — ENCOUNTER SYMPTOMS
NAUSEA: 0
WHEEZING: 0
TROUBLE SWALLOWING: 0
CHEST TIGHTNESS: 0
SHORTNESS OF BREATH: 0
BACK PAIN: 1
CHOKING: 0
PHOTOPHOBIA: 0
VOMITING: 0
COLOR CHANGE: 0
ABDOMINAL DISTENTION: 0
VOICE CHANGE: 0
COUGH: 0

## 2025-03-14 NOTE — PROGRESS NOTES
[facility] NEUROSURGERY DEPARTMENT  [address] [place] [address]  [address]  [place] [id number]  Dept: [phone]  Dept Fax: [phone]      Patient Name:  Ivon Russ  Visit Date:  3/14/2025    HPI:     Ms. Russ is a 44 y.o. female that presents today at [facility] for evaluation of the following: For ongoing back and bilateral hip and predominant left lower extremity radicular pain.  She is a patient who presents today with updated imaging including a CT lumbar discogram.  She is a patient who is known to our service having underwent a L5-S1 instrumented fusion on the date of 2/2/2023 under the care of of Dr. Kee and was last seen in our office on the day of 8/16/2024.  She continues to struggle with significant back and bilateral lower extremity pain and left leg weakness.  She describes the symptoms of bilateral hip pain and pain that radiates in the posterior lateral aspect of her left leg down into her foot with associated weakness.  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  She ultimately feels the symptoms have caused a decline in her quality of life as she does feel limited with what she can do on a day-to-day basis.  She notes her pain is worst 10 out of 10, pain is best 4 out of 10 when sitting on a VAS scale.  She has been following with interventional pain management under the care of of pain management at [facility] under the care of Dr. Meade in which she feels she has maximized her treatment and has ultimately failed.  Other conservative measures has consisted of 800 mg of Motrin, activity modifications, and home stretches all with minimal to no substantial relief.  She had previously underwent an MRI back in April 2024 which did

## 2025-03-17 DIAGNOSIS — M47.816 LUMBAR SPONDYLOSIS: ICD-10-CM

## 2025-03-17 DIAGNOSIS — Z98.890 HISTORY OF BACK SURGERY: ICD-10-CM

## 2025-03-17 DIAGNOSIS — M79.18 BILATERAL MYOFASCIAL PAIN: ICD-10-CM

## 2025-03-17 RX ORDER — ORPHENADRINE CITRATE 100 MG/1
100 TABLET ORAL 2 TIMES DAILY
Qty: 60 TABLET | Refills: 2 | OUTPATIENT
Start: 2025-03-17 | End: 2025-06-15

## 2025-04-01 ENCOUNTER — HOSPITAL ENCOUNTER (OUTPATIENT)
Dept: MRI IMAGING | Age: 45
Discharge: HOME | End: 2025-04-01
Attending: NURSE PRACTITIONER
Payer: COMMERCIAL

## 2025-04-01 DIAGNOSIS — M54.16 LUMBAR RADICULOPATHY: ICD-10-CM

## 2025-04-01 DIAGNOSIS — M51.369 ADJACENT SEGMENT DISEASE OF LUMBAR SPINE WITH HISTORY OF FUSION PROCEDURE: ICD-10-CM

## 2025-04-01 DIAGNOSIS — Z98.1 ADJACENT SEGMENT DISEASE OF LUMBAR SPINE WITH HISTORY OF FUSION PROCEDURE: ICD-10-CM

## 2025-04-01 DIAGNOSIS — M96.1 FAILED BACK SYNDROME: ICD-10-CM

## 2025-04-01 DIAGNOSIS — M51.26 HNP (HERNIATED NUCLEUS PULPOSUS), LUMBAR: ICD-10-CM

## 2025-04-01 PROCEDURE — 72148 MRI LUMBAR SPINE W/O DYE: CPT

## 2025-04-04 ENCOUNTER — OFFICE VISIT (OUTPATIENT)
Dept: NEUROSURGERY | Age: 45
End: 2025-04-04
Payer: COMMERCIAL

## 2025-04-04 ENCOUNTER — OFFICE VISIT (OUTPATIENT)
Dept: PHYSICAL MEDICINE AND REHAB | Age: 45
End: 2025-04-04
Payer: COMMERCIAL

## 2025-04-04 VITALS
WEIGHT: 190 LBS | BODY MASS INDEX: 33.66 KG/M2 | DIASTOLIC BLOOD PRESSURE: 93 MMHG | SYSTOLIC BLOOD PRESSURE: 149 MMHG | HEIGHT: 63 IN | HEART RATE: 70 BPM

## 2025-04-04 VITALS
SYSTOLIC BLOOD PRESSURE: 120 MMHG | WEIGHT: 190.04 LBS | HEIGHT: 63 IN | BODY MASS INDEX: 33.67 KG/M2 | DIASTOLIC BLOOD PRESSURE: 72 MMHG

## 2025-04-04 DIAGNOSIS — M51.369 ADJACENT SEGMENT DISEASE OF LUMBAR SPINE WITH HISTORY OF FUSION PROCEDURE: ICD-10-CM

## 2025-04-04 DIAGNOSIS — M47.816 LUMBAR SPONDYLOSIS: ICD-10-CM

## 2025-04-04 DIAGNOSIS — Z98.890 HISTORY OF BACK SURGERY: ICD-10-CM

## 2025-04-04 DIAGNOSIS — G95.0 SYRINX OF SPINAL CORD (HCC): ICD-10-CM

## 2025-04-04 DIAGNOSIS — M53.3 SI (SACROILIAC) JOINT DYSFUNCTION: ICD-10-CM

## 2025-04-04 DIAGNOSIS — Z98.1 ADJACENT SEGMENT DISEASE OF LUMBAR SPINE WITH HISTORY OF FUSION PROCEDURE: ICD-10-CM

## 2025-04-04 DIAGNOSIS — G89.4 CHRONIC PAIN SYNDROME: ICD-10-CM

## 2025-04-04 DIAGNOSIS — Z98.1 STATUS POST LUMBAR SPINAL FUSION: ICD-10-CM

## 2025-04-04 DIAGNOSIS — M79.18 BILATERAL MYOFASCIAL PAIN: ICD-10-CM

## 2025-04-04 DIAGNOSIS — M54.16 LUMBAR RADICULOPATHY: Primary | ICD-10-CM

## 2025-04-04 PROCEDURE — G8417 CALC BMI ABV UP PARAM F/U: HCPCS | Performed by: NURSE PRACTITIONER

## 2025-04-04 PROCEDURE — 99214 OFFICE O/P EST MOD 30 MIN: CPT | Performed by: NURSE PRACTITIONER

## 2025-04-04 PROCEDURE — 1036F TOBACCO NON-USER: CPT | Performed by: NURSE PRACTITIONER

## 2025-04-04 PROCEDURE — G8427 DOCREV CUR MEDS BY ELIG CLIN: HCPCS | Performed by: NURSE PRACTITIONER

## 2025-04-04 RX ORDER — ORPHENADRINE CITRATE 100 MG/1
100 TABLET ORAL 2 TIMES DAILY
Qty: 60 TABLET | Refills: 2 | Status: SHIPPED | OUTPATIENT
Start: 2025-04-04 | End: 2025-07-03

## 2025-04-04 NOTE — PROGRESS NOTES
color change, rash and wound.   Neurological:  Positive for weakness and numbness. Negative for dizziness, light-headedness and headaches.   Psychiatric/Behavioral:  Negative for agitation. The patient is not nervous/anxious.        Objective:     BP (!) 149/93 (BP Site: Left Upper Arm, Patient Position: Sitting, BP Cuff Size: Large Adult)   Pulse 70   Ht 1.6 m (5' 2.99\")   Wt 86.2 kg (190 lb)   BMI 33.67 kg/m²     Physical Exam  Constitutional:       Appearance: Normal appearance. She is obese.          Comments: Bilateral hip pain and pain that radiates in the posterior and lateral aspect the left leg with left foot numbness   HENT:      Head: Normocephalic.   Eyes:      Extraocular Movements: Extraocular movements intact.      Pupils: Pupils are equal, round, and reactive to light.   Cardiovascular:      Rate and Rhythm: Normal rate.      Pulses: Normal pulses.   Pulmonary:      Effort: Pulmonary effort is normal.   Abdominal:      General: Bowel sounds are normal.      Palpations: Abdomen is soft.   Musculoskeletal:         General: Tenderness present.      Cervical back: Normal range of motion.      Comments: Skin is warm dry and intact.  She maintains posterior pulses in her posterior tibial artery bilaterally.  She does note hypersensitivity of the left foot.  Otherwise she maintains normal sensation throughout the L2-L4 dermatomes.  There is noted weakness on examination left 4/5 with hip flexion and knee extension she does demonstrate a negative straight leg raise.  Otherwise she maintains 5/5 motor strength throughout her remaining bilateral lower extremity motors.  She does have noted tenderness over the lower lumbar vertebral bodies and paraspinous muscles.  No gross deformities noted.  She is tender over the sacroiliac joints bilaterally left greater than right   Skin:     General: Skin is warm and dry.   Neurological:      General: No focal deficit present.      Mental Status: She is alert and

## 2025-04-04 NOTE — PROGRESS NOTES
Functionality Assessment/Goals Worksheet     On a scale of 0 (Does not Interfere) to 10 (Completely Interferes)     1.  Which number describes how during the past week pain has interfered with           the following:  A.  General Activity:  6  B.  Mood: 1  C.  Walking Ability:  6  D.  Normal Work (Includes both work outside the home and housework):  6  E.  Relations with Other People:   0  F.  Sleep:   4  G.  Enjoyment of Life:   6    2.  Patient Prefers to Take their Pain Medications:     []  On a regular basis   []  Only when necessary    [x]  Does not take pain medications    3.  What are the Patient's Goals/Expectations for Visiting Pain Management?     []  Learn about my pain    []  Receive Medication   []  Physical Therapy     []  Treat Depression   []  Receive Injections    []  Treat Sleep   []  Deal with Anxiety and Stress   []  Treat Opoid Dependence/Addiction   [x]  Other: \"No more pain\"                               
Functionality Assessment/Goals Worksheet     On a scale of 0 (Does not Interfere) to 10 (Completely Interferes)     1.  Which number describes how during the past week pain has interfered with           the following:  A.  General Activity:  {0-10:1661908549}  B.  Mood: {0-10:7725936148}  C.  Walking Ability:  {0-10:1984215017}  D.  Normal Work (Includes both work outside the home and housework):  {0-10:4409211975}  E.  Relations with Other People:   {0-10:5226235201}  F.  Sleep:   {0-10:5512898320}  G.  Enjoyment of Life:   {0-10:4628259583}    2.  Patient Prefers to Take their Pain Medications:     []  On a regular basis   []  Only when necessary    []  Does not take pain medications    3.  What are the Patient's Goals/Expectations for Visiting Pain Management?     []  Learn about my pain    []  Receive Medication   []  Physical Therapy     []  Treat Depression   []  Receive Injections    []  Treat Sleep   []  Deal with Anxiety and Stress   []  Treat Opoid Dependence/Addiction   []  Other:                              Functionality Assessment/Goals Worksheet     On a scale of 0 (Does not Interfere) to 10 (Completely Interferes)     1.  Which number describes how during the past week pain has interfered with           the following:  A.  General Activity:  {0-10:4593404632}  B.  Mood: {0-10:2255010544}  C.  Walking Ability:  {0-10:4240608847}  D.  Normal Work (Includes both work outside the home and housework):  {0-10:3086236081}  E.  Relations with Other People:   {0-10:3384902165}  F.  Sleep:   {0-10:5316748931}  G.  Enjoyment of Life:   {0-10:3559796470}    2.  Patient Prefers to Take their Pain Medications:     []  On a regular basis   []  Only when necessary    []  Does not take pain medications    3.  What are the Patient's Goals/Expectations for Visiting Pain Management?     []  Learn about my pain    []  Receive Medication   []  Physical Therapy     []  Treat Depression   []  Receive Injections    []  Treat 
take Lyrica 200 mg nightly and ibuprofen 800 mg as needed.  She states she was told by neurosurgery that her disc spacer has moved and she is not sure if she is going to need repeat surgery.  She states she has been off for 5 to 6 months at this point and she is considering applying for disability.  She continues to have some numbness in her left leg/toes.  She denies any new leg paresthesias, leg weakness, saddle anesthesia, bowel/bladder incontinence.    Today, 6/14/2024, patient presents for planned follow-up on chronic pain.  She continues to have phenomenal relief from the trigger point injections that we have been doing every 3 months.  She does have ongoing numbness in her legs and is planning to undergo another back surgery with Dr. Kee.  She is waiting to get that set up.  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  They plan on doing an L3 through pelvis fusion.  She continues to take the Lyrica 200 mg nightly which has been beneficial.  She would like to hold off on any other changes.  She denies any new symptoms at this time such as new saddle anesthesia or bowel/bladder incontinence.    Today, 8/30/2024, patient presents for planned follow-up on chronic pain.  Patient states trigger point ejections seem to aggravate her pain last visit.  Prior to that she has had phenomenal relief from trigger point injections and she would like to repeat those in office today.  She is trying to get surgery set up but insurance has denied it twice.  She is scheduled for a CT scan and discogram next week and I will go from there.  Neurosurgery discussed spinal cord stimulation with her but she does not want a device implanted.  She continues to have the low back pain with pain into her legs and numbness in her left foot.  She states her left foot feel swollen.  The leg pain has been more controlled since continuing Lyrica.  She states her pain

## 2025-04-08 ENCOUNTER — TELEPHONE (OUTPATIENT)
Dept: PHYSICAL MEDICINE AND REHAB | Age: 45
End: 2025-04-08

## 2025-04-08 NOTE — TELEPHONE ENCOUNTER
I left patient a message that [facility] was wanting her to come into the office Friday April 11th. After speaking with the neurosurgery team she would like to see her to set up procedures.

## 2025-04-08 NOTE — TELEPHONE ENCOUNTER
Pt called and LVM saying she wants to get scheduled for injection with Dr Meade. Reviewed SHAVON Bales CNP office note from 4/4 and no mention of doing injection. Recalled pt. For further clarification and had to LVM to recall us as no order or note mentioning doing an injection.

## 2025-04-11 ENCOUNTER — OFFICE VISIT (OUTPATIENT)
Dept: PHYSICAL MEDICINE AND REHAB | Age: 45
End: 2025-04-11
Payer: COMMERCIAL

## 2025-04-11 VITALS
SYSTOLIC BLOOD PRESSURE: 136 MMHG | WEIGHT: 190.04 LBS | BODY MASS INDEX: 33.67 KG/M2 | HEIGHT: 63 IN | DIASTOLIC BLOOD PRESSURE: 86 MMHG

## 2025-04-11 DIAGNOSIS — G89.4 CHRONIC PAIN SYNDROME: ICD-10-CM

## 2025-04-11 DIAGNOSIS — M53.3 SI (SACROILIAC) JOINT DYSFUNCTION: ICD-10-CM

## 2025-04-11 DIAGNOSIS — M51.362 DEGENERATION OF INTERVERTEBRAL DISC OF LUMBAR REGION WITH DISCOGENIC BACK PAIN AND LOWER EXTREMITY PAIN: ICD-10-CM

## 2025-04-11 DIAGNOSIS — M47.816 LUMBAR SPONDYLOSIS: ICD-10-CM

## 2025-04-11 DIAGNOSIS — M47.819 FACET ARTHROPATHY: ICD-10-CM

## 2025-04-11 DIAGNOSIS — Z98.890 HISTORY OF BACK SURGERY: Primary | ICD-10-CM

## 2025-04-11 DIAGNOSIS — M79.18 BILATERAL MYOFASCIAL PAIN: ICD-10-CM

## 2025-04-11 PROCEDURE — 99214 OFFICE O/P EST MOD 30 MIN: CPT | Performed by: NURSE PRACTITIONER

## 2025-04-11 PROCEDURE — G8417 CALC BMI ABV UP PARAM F/U: HCPCS | Performed by: NURSE PRACTITIONER

## 2025-04-11 PROCEDURE — G8427 DOCREV CUR MEDS BY ELIG CLIN: HCPCS | Performed by: NURSE PRACTITIONER

## 2025-04-11 PROCEDURE — 1036F TOBACCO NON-USER: CPT | Performed by: NURSE PRACTITIONER

## 2025-04-11 NOTE — PROGRESS NOTES
Functionality Assessment/Goals Worksheet     On a scale of 0 (Does not Interfere) to 10 (Completely Interferes)     1.  Which number describes how during the past week pain has interfered with           the following:  A.  General Activity:  7  B.  Mood: 1  C.  Walking Ability:  7  D.  Normal Work (Includes both work outside the home and housework):  6  E.  Relations with Other People:   1  F.  Sleep:   5  G.  Enjoyment of Life:   5    2.  Patient Prefers to Take their Pain Medications:     []  On a regular basis   []  Only when necessary    [x]  Does not take pain medications    3.  What are the Patient's Goals/Expectations for Visiting Pain Management?     []  Learn about my pain    []  Receive Medication   []  Physical Therapy     []  Treat Depression   []  Receive Injections    []  Treat Sleep   []  Deal with Anxiety and Stress   []  Treat Opoid Dependence/Addiction   [x]  Other: \"control my pain\"                               
which made her very tired so she cut back to 1 and has been doing pretty well on this dose.  She is wondering if I can refill the Lyrica 150 mg at night.  In regards to her low back she states if she bends over or is down on the floor she has trouble getting back up.  She states it hurts to get back up but then when she is moving she does okay.  She feels a lot of stiffness in her low back and feels her self leaning forward due to being stiff.  She denies any new leg weakness, saddle anesthesia, or bowel/bladder changes.    Today, 12/28/2023, patient presents for planned follow-up on chronic pain.  Patient states she has been taking the Lyrica 200 mg nightly and does notice this has helped significantly with her leg pain.  She has not really been struggling with low back pain.  She states her ibuprofen has not been effective.  She also has tramadol and Norco from the past that she has taken on occasion which is also ineffective.  She states the weather does seem to affect her pain and therefore it has been worse.  She states she just finished physical therapy and has not noticed significant improvement.  She states pain is bad when going from sitting to standing and trying to stand up straight.  She is also getting some pins-and-needles feeling in her toes.  She does have an appointment with neurosurgery in a couple weeks.  She states it feels like her left foot is swollen but it does not look swollen.  She denies any other new symptoms at this time.    Today, 3/22/2024, patient presents for planned follow-up on chronic pain.  Patient states she obtained about 90% relief from her back pain after trigger point injections last visit.  She feels she is not quite back to her baseline but it has worn off and she would like to repeat trigger point injections in office today.  She has not been taking the Flexeril as she does feel it was making her constipated which improved after stopping this medication.  She continues to

## 2025-04-16 ENCOUNTER — PROCEDURE VISIT (OUTPATIENT)
Dept: ALLERGY | Age: 45
End: 2025-04-16

## 2025-04-16 VITALS
BODY MASS INDEX: 33.66 KG/M2 | HEIGHT: 63 IN | RESPIRATION RATE: 18 BRPM | HEART RATE: 73 BPM | SYSTOLIC BLOOD PRESSURE: 139 MMHG | DIASTOLIC BLOOD PRESSURE: 88 MMHG | OXYGEN SATURATION: 95 %

## 2025-04-16 DIAGNOSIS — J30.9 ALLERGIC RHINOCONJUNCTIVITIS: ICD-10-CM

## 2025-04-16 DIAGNOSIS — Z91.09 POLLEN ALLERGIES: ICD-10-CM

## 2025-04-16 DIAGNOSIS — R05.3 CHRONIC COUGH: ICD-10-CM

## 2025-04-16 DIAGNOSIS — Z91.048 ALLERGY TO MOLD: ICD-10-CM

## 2025-04-16 DIAGNOSIS — Z91.09 MITE ALLERGY: ICD-10-CM

## 2025-04-16 DIAGNOSIS — Z29.89 PROPHYLACTIC IMMUNOTHERAPY: Primary | ICD-10-CM

## 2025-04-16 DIAGNOSIS — J45.40 MODERATE PERSISTENT ASTHMA WITHOUT COMPLICATION: ICD-10-CM

## 2025-04-16 DIAGNOSIS — Z91.038 ALLERGY TO COCKROACHES: ICD-10-CM

## 2025-04-16 DIAGNOSIS — J30.81 ALLERGY TO DOG DANDER: ICD-10-CM

## 2025-04-16 DIAGNOSIS — J30.81 CAT ALLERGIES: ICD-10-CM

## 2025-04-16 DIAGNOSIS — R89.8 EOSINOPHILS INCREASED: ICD-10-CM

## 2025-04-16 DIAGNOSIS — H10.10 ALLERGIC RHINOCONJUNCTIVITIS: ICD-10-CM

## 2025-04-16 RX ORDER — MONTELUKAST SODIUM 10 MG/1
10 TABLET ORAL NIGHTLY
Qty: 90 TABLET | Refills: 11 | Status: SHIPPED | OUTPATIENT
Start: 2025-04-16 | End: 2025-04-16

## 2025-04-16 RX ORDER — MONTELUKAST SODIUM 10 MG/1
10 TABLET ORAL NIGHTLY
Qty: 30 TABLET | Refills: 11 | Status: SHIPPED | OUTPATIENT
Start: 2025-04-16

## 2025-04-16 RX ORDER — ALBUTEROL SULFATE 90 UG/1
2 INHALANT RESPIRATORY (INHALATION) 4 TIMES DAILY PRN
Qty: 54 G | Refills: 1 | Status: SHIPPED | OUTPATIENT
Start: 2025-04-16

## 2025-04-16 RX ORDER — EPINEPHRINE 0.3 MG/.3ML
0.3 INJECTION SUBCUTANEOUS PRN
Qty: 1 EACH | Refills: 0 | Status: SHIPPED | OUTPATIENT
Start: 2025-04-16

## 2025-04-16 RX ORDER — CETIRIZINE HYDROCHLORIDE 10 MG/1
10 TABLET ORAL NIGHTLY
Qty: 30 TABLET | Refills: 11 | Status: SHIPPED | OUTPATIENT
Start: 2025-04-16

## 2025-04-16 ASSESSMENT — ENCOUNTER SYMPTOMS
COUGH: 1
SHORTNESS OF BREATH: 1

## 2025-04-24 ENCOUNTER — HOSPITAL ENCOUNTER (OUTPATIENT)
Dept: PHYSICAL THERAPY | Age: 45
Setting detail: THERAPIES SERIES
Discharge: HOME | End: 2025-04-24
Payer: COMMERCIAL

## 2025-04-24 PROCEDURE — 97162 PT EVAL MOD COMPLEX 30 MIN: CPT

## 2025-04-24 NOTE — PROGRESS NOTES
[facility]  PHYSICAL THERAPY  [x] EVALUATION  [] DAILY NOTE (LAND) [] DAILY NOTE (AQUATIC ) [] PROGRESS NOTE [] DISCHARGE NOTE    [x] OUTPATIENT REHABILITATION CENTER [facility]   [] [facility]    [] [facility]   [] [facility]    Date: 2025  Patient Name:  Ivon Russ  : 1980  MRN: [id number]  [facility]: [id number]    Referring Practitioner Joan Bales, ELIZABETH - CNP [phone]      Diagnosis  Diagnoses       Z98.890 (ICD-10-CM) - History of back surgery    M47.816 (ICD-10-CM) - Lumbar spondylosis    M79.18 (ICD-10-CM) - Bilateral myofascial pain    G89.4 (ICD-10-CM) - Chronic pain syndrome    M53.3 (ICD-10-CM) - SI (sacroiliac) joint dysfunction    M47.819 (ICD-10-CM) - Facet arthropathy    M51.362 (ICD-10-CM) - Degeneration of intervertebral disc of lumbar region with discogenic back pain and lower extremity pain           Treatment Diagnosis M54.59  Other Low Back Pain     Date of Evaluation 25   Additional Pertinent History Ivon Russ has a past medical history of Asthma, Depression, High blood pressure, Irregular heart rate, and Migraines.  she has a past surgical history that includes  section; knee surgery (Left); Tubal ligation; Back Injection (Bilateral, 2022); Pain management procedure (Bilateral, 2023); lumbar fusion (N/A, 2023); Lumbar disc surgery (2023); and Urethral Surgery (N/A, 2023).     Allergies No Known Allergies   Medications   Current Outpatient Medications:     beclomethasone (QVAR REDIHALER) 80 MCG/ACT AERB inhaler, Inhale 1 puff into the lungs in the morning and 1 puff in the evening., Disp: 1 each, Rfl: 2    albuterol sulfate HFA (VENTOLIN HFA) 108 (90 Base) MCG/ACT inhaler, Inhale 2 puffs into the lungs 4 times daily as needed for Wheezing or Shortness of Breath, Disp: 54 g, Rfl: 1    cetirizine (ZYRTEC) 10 MG tablet, Take 1 tablet by mouth nightly, Disp: 30 tablet, Rfl:

## 2025-04-28 ENCOUNTER — APPOINTMENT (OUTPATIENT)
Dept: PHYSICAL THERAPY | Age: 45
End: 2025-04-28
Payer: COMMERCIAL

## 2025-04-28 ENCOUNTER — TELEPHONE (OUTPATIENT)
Dept: ALLERGY | Age: 45
End: 2025-04-28

## 2025-04-29 DIAGNOSIS — J30.9 CHRONIC ALLERGIC RHINITIS: Primary | ICD-10-CM

## 2025-04-29 DIAGNOSIS — Z29.89 IMMUNOTHERAPY: ICD-10-CM

## 2025-04-29 NOTE — PROGRESS NOTES
provider for instruction Ask provider for instruction     Patient/gaurdian made aware of potential anaphylaxis risks associated with receiving allergy injections and wishes to proceed.  SHOT REACTION TREATMENT INSTRUCTIONS    During the 30 minute wait after an allergy injection the following symptoms should be reported:    Itching other than at the injection site  Hives or swelling other than at the injection site  Redness other than at the injection site  Difficulty breathing  Chest tightness  Difficulty swallowing  Throat tightness    If these symptoms occur, NOTIFY PROVIDER and the following treatment should be administered:    1.  Epinephrine 1:1000 IM - 0.3 ml if > 66 lbs or more, 0.15 ml if 33 - 63 lbs, or 0.1 ml if <33 lbs   2.  Diphenhydramine - give all intramuscular:     2 to <6 years (off-label use): 6.25 mg,    6 to <12 years: 12.5 to 25 mg;    >=12 years: 25-50 mg.  3.  Famotidine:  Adults 40 mg oral    Adolescents age 16 years and >88 lbs: 40 mg    Children and Adolescents <=16 years of age: Initial: 0.25 mg/kg/dose every 12 hours (maximum daily dose: 40 mg/day)    Epi dose may me repeated in 5-15 minutes if adequate resolution of symptoms does not occur    Patient should be observed for at least one hour after final epi dose and must be seen by provider.  Patients cannot drive themselves if they have received diphenhydramine.      Patient needs allergy serum vials which were mixed.  It is medically necessary to mix patient's vials from a one-to-one concentration at other vials diluted to a 1:10, 1-100, 1:1000, and 1:10,000 concentration for appropriate dosage of the patient.  As a result the patient will be billed for all total serums that were mixed.  This ensures that the patient will not run out of serum and will receive appropriate care.  Crystal Parkinson DNP, APRN-BC personally approved the serum mixing and was present in the office during the mixing process       Allergy immunotherapy is medically

## 2025-05-07 ENCOUNTER — HOSPITAL ENCOUNTER (OUTPATIENT)
Dept: PULMONOLOGY | Age: 45
Discharge: HOME | End: 2025-05-07
Payer: COMMERCIAL

## 2025-05-07 ENCOUNTER — CLINICAL SUPPORT (OUTPATIENT)
Dept: ALLERGY | Age: 45
End: 2025-05-07
Payer: COMMERCIAL

## 2025-05-07 ENCOUNTER — TELEPHONE (OUTPATIENT)
Dept: ALLERGY | Age: 45
End: 2025-05-07

## 2025-05-07 ENCOUNTER — HOSPITAL ENCOUNTER (OUTPATIENT)
Dept: PHYSICAL THERAPY | Age: 45
Setting detail: THERAPIES SERIES
Discharge: HOME | End: 2025-05-07
Payer: COMMERCIAL

## 2025-05-07 VITALS
RESPIRATION RATE: 18 BRPM | SYSTOLIC BLOOD PRESSURE: 134 MMHG | HEART RATE: 75 BPM | OXYGEN SATURATION: 98 % | DIASTOLIC BLOOD PRESSURE: 91 MMHG

## 2025-05-07 DIAGNOSIS — J45.40 MODERATE PERSISTENT REACTIVE AIRWAY DISEASE WITHOUT COMPLICATION: Primary | ICD-10-CM

## 2025-05-07 DIAGNOSIS — J30.9 CHRONIC ALLERGIC RHINITIS: ICD-10-CM

## 2025-05-07 DIAGNOSIS — J30.1 SEASONAL ALLERGIC RHINITIS DUE TO POLLEN: Primary | ICD-10-CM

## 2025-05-07 DIAGNOSIS — J45.40 MODERATE PERSISTENT ASTHMA WITHOUT COMPLICATION: ICD-10-CM

## 2025-05-07 PROCEDURE — 94726 PLETHYSMOGRAPHY LUNG VOLUMES: CPT

## 2025-05-07 PROCEDURE — 94729 DIFFUSING CAPACITY: CPT

## 2025-05-07 PROCEDURE — 94060 EVALUATION OF WHEEZING: CPT

## 2025-05-07 PROCEDURE — 97113 AQUATIC THERAPY/EXERCISES: CPT

## 2025-05-07 PROCEDURE — 95117 IMMUNOTHERAPY INJECTIONS: CPT | Performed by: NURSE PRACTITIONER

## 2025-05-07 NOTE — TELEPHONE ENCOUNTER
Called pt and informed. Transferred call to central scheduling to schedule.  Pt verbalized understanding and thanked me.

## 2025-05-07 NOTE — PROGRESS NOTES
[facility]  PHYSICAL THERAPY  [] EVALUATION  [] DAILY NOTE (LAND) [x] DAILY NOTE (AQUATIC ) [] PROGRESS NOTE [] DISCHARGE NOTE    [x] OUTPATIENT REHABILITATION CENTER [facility]   [] [facility] CARE [place]    [] [facility]   [] [facility]    Date: 2025  Patient Name:  Ivon Russ  : 1980  MRN: [id number]  [facility]: [id number]    Referring Practitioner Joan Bales, ELIZABETH - CNP [phone]      Diagnosis  Diagnoses       Z98.890 (ICD-10-CM) - History of back surgery    M47.816 (ICD-10-CM) - Lumbar spondylosis    M79.18 (ICD-10-CM) - Bilateral myofascial pain    G89.4 (ICD-10-CM) - Chronic pain syndrome    M53.3 (ICD-10-CM) - SI (sacroiliac) joint dysfunction    M47.819 (ICD-10-CM) - Facet arthropathy    M51.362 (ICD-10-CM) - Degeneration of intervertebral disc of lumbar region with discogenic back pain and lower extremity pain           Treatment Diagnosis M54.59  Other Low Back Pain     Date of Evaluation 25   Additional Pertinent History Ivon Russ has a past medical history of Asthma, Depression, High blood pressure, Irregular heart rate, and Migraines.  she has a past surgical history that includes  section; knee surgery (Left); Tubal ligation; Back Injection (Bilateral, 2022); Pain management procedure (Bilateral, 2023); lumbar fusion (N/A, 2023); Lumbar disc surgery (2023); and Urethral Surgery (N/A, 2023).     Allergies No Known Allergies   Medications   Current Outpatient Medications:     ALLERGEN EXTRACT, Inject as directed Twice a Week, Disp: , Rfl:     beclomethasone (QVAR REDIHALER) 80 MCG/ACT AERB inhaler, Inhale 1 puff into the lungs in the morning and 1 puff in the evening., Disp: 1 each, Rfl: 2    albuterol sulfate HFA (VENTOLIN HFA) 108 (90 Base) MCG/ACT inhaler, Inhale 2 puffs into the lungs 4 times daily as needed for Wheezing or Shortness of Breath, Disp: 54 g, Rfl: 1    cetirizine (ZYRTEC)

## 2025-05-07 NOTE — PROGRESS NOTES
PATIENT HAS THE FOLLOWING DIAGNOSIS SUPPORTING ADMINISTRATION OF ALLERGY INJECTIONS: J30.1Allergic rhinitis due to pollen  AND 47061 MULTIPLE ALLERGY INJECTIONS FOR ALLERGY INJECTION ADMINISTRATION      Patient here for allergy injection today.  Patient was presented with the opportunity to speak with provider and ask questions regarding allergy injections.  Patient was also instructed they need to wait 30 minutes after receiving allergy injections. All risks associated with potential adverse effects have been explained to patient and patient handout was provided following allergy testing.    After consent obtained/verified, allergy injection subcutaneously given in back of arm(s).  Please see below for specific details of site injections, concentration of serum, and volume injected.    VIAL COLOR OF ALL VIALS TODAY IS silver 1:10,000. Vial allergy w/v concentration today is: 1:10,000     ALLERGY INJECTION FROM VIAL A GIVEN right  UPPER ARM IN THE AMOUNT OF 0.05 ML    ALLERGY INJECTION FROM VIAL B GIVEN left upper ARM IN THE AMOUNT OF 0.05  ML    ALLERGY INJECTION FROM VIAL C GIVEN leftlower ARM IN THE AMOUNT OF 0.05 ML      Documentation of vial injection specific to arm(s) noted on Allergy Immunotherapy Administration Form.       Patient waited 30 minutes for observation.Yes  Patient has received information and verbalizes understanding of the potential  health risks associated with allergy injections . Patient understands risks including anaphylaxis and chooses not to wait 30 minutes after administration of allergy injection(s).    Patient tolerated well without adverse reaction while the patient was in the office.    SHOT REACTION TREATMENT INSTRUCTIONS    During the 30 minute wait after an allergy injection the following symptoms should be reported:    Itching other than at the injection site  Hives or swelling other than at the injection site  Redness other than at the injection site  Difficulty

## 2025-05-12 ENCOUNTER — APPOINTMENT (OUTPATIENT)
Dept: PHYSICAL THERAPY | Age: 45
End: 2025-05-12
Payer: COMMERCIAL

## 2025-05-13 ENCOUNTER — CLINICAL SUPPORT (OUTPATIENT)
Dept: ALLERGY | Age: 45
End: 2025-05-13
Payer: COMMERCIAL

## 2025-05-13 DIAGNOSIS — J30.9 CHRONIC ALLERGIC RHINITIS: ICD-10-CM

## 2025-05-13 DIAGNOSIS — J30.1 SEASONAL ALLERGIC RHINITIS DUE TO POLLEN: Primary | ICD-10-CM

## 2025-05-13 PROCEDURE — 95117 IMMUNOTHERAPY INJECTIONS: CPT | Performed by: NURSE PRACTITIONER

## 2025-05-13 NOTE — PROGRESS NOTES
PATIENT HAS THE FOLLOWING DIAGNOSIS SUPPORTING ADMINISTRATION OF ALLERGY INJECTIONS: J30.1Allergic rhinitis due to pollen  AND 63224 MULTIPLE ALLERGY INJECTIONS FOR ALLERGY INJECTION ADMINISTRATION      Patient here for allergy injection today.  Patient was presented with the opportunity to speak with provider and ask questions regarding allergy injections.  Patient was also instructed they need to wait 30 minutes after receiving allergy injections. All risks associated with potential adverse effects have been explained to patient and patient handout was provided following allergy testing.    After consent obtained/verified, allergy injection subcutaneously given in back of arm(s).  Please see below for specific details of site injections, concentration of serum, and volume injected.    VIAL COLOR OF ALL VIALS TODAY IS silver 1:10,000. Vial allergy w/v concentration today is: 1:10,000     ALLERGY INJECTION FROM VIAL A GIVEN left  UPPER ARM IN THE AMOUNT OF 0.10 ML    ALLERGY INJECTION FROM VIAL B GIVEN right lower ARM IN THE AMOUNT OF 0.10  ML    ALLERGY INJECTION FROM VIAL C GIVEN rightupper ARM IN THE AMOUNT OF 0.10 ML      Documentation of vial injection specific to arm(s) noted on Allergy Immunotherapy Administration Form.       Patient waited 30 minutes for observation.No  Patient has received information and verbalizes understanding of the potential  health risks associated with allergy injections . Patient understands risks including anaphylaxis and chooses not to wait 30 minutes after administration of allergy injection(s).    Patient tolerated well without adverse reaction while the patient was in the office.    SHOT REACTION TREATMENT INSTRUCTIONS    During the 30 minute wait after an allergy injection the following symptoms should be reported:    Itching other than at the injection site  Hives or swelling other than at the injection site  Redness other than at the injection site  Difficulty

## 2025-05-15 ENCOUNTER — HOSPITAL ENCOUNTER (OUTPATIENT)
Dept: PHYSICAL THERAPY | Age: 45
Setting detail: THERAPIES SERIES
Discharge: HOME | End: 2025-05-15
Payer: COMMERCIAL

## 2025-05-15 PROCEDURE — 97113 AQUATIC THERAPY/EXERCISES: CPT

## 2025-05-15 NOTE — PROGRESS NOTES
[facility]  PHYSICAL THERAPY  [] EVALUATION  [] DAILY NOTE (LAND) [x] DAILY NOTE (AQUATIC ) [] PROGRESS NOTE [] DISCHARGE NOTE    [x] OUTPATIENT REHABILITATION CENTER [facility]   [] [facility] CARE [place]    [] [facility]   [facility]    Date: 5/15/2025  Patient Name:  Ivon Russ  : 1980  MRN: [id number]  [facility]: [id number]    Referring Practitioner Joan Bales, ELIZABETH - CNP [phone]      Diagnosis  Diagnoses       Z98.890 (ICD-10-CM) - History of back surgery    M47.816 (ICD-10-CM) - Lumbar spondylosis    M79.18 (ICD-10-CM) - Bilateral myofascial pain    G89.4 (ICD-10-CM) - Chronic pain syndrome    M53.3 (ICD-10-CM) - SI (sacroiliac) joint dysfunction    M47.819 (ICD-10-CM) - Facet arthropathy    M51.362 (ICD-10-CM) - Degeneration of intervertebral disc of lumbar region with discogenic back pain and lower extremity pain           Treatment Diagnosis M54.59  Other Low Back Pain     Date of Evaluation 25   Additional Pertinent History Ivon Russ has a past medical history of Asthma, Depression, High blood pressure, Irregular heart rate, and Migraines.  she has a past surgical history that includes  section; knee surgery (Left); Tubal ligation; Back Injection (Bilateral, 2022); Pain management procedure (Bilateral, 2023); lumbar fusion (N/A, 2023); Lumbar disc surgery (2023); and Urethral Surgery (N/A, 2023).     Allergies No Known Allergies   Medications   Current Outpatient Medications:     ALLERGEN EXTRACT, Inject as directed Twice a Week, Disp: , Rfl:     beclomethasone (QVAR REDIHALER) 80 MCG/ACT AERB inhaler, Inhale 1 puff into the lungs in the morning and 1 puff in the evening., Disp: 1 each, Rfl: 2    albuterol sulfate HFA (VENTOLIN HFA) 108 (90 Base) MCG/ACT inhaler, Inhale 2 puffs into the lungs 4 times daily as needed for Wheezing or Shortness of Breath, Disp: 54 g, Rfl: 1    cetirizine (ZYRTEC) 
Myself

## 2025-05-19 ENCOUNTER — APPOINTMENT (OUTPATIENT)
Dept: PHYSICAL THERAPY | Age: 45
End: 2025-05-19
Payer: COMMERCIAL

## 2025-05-21 ENCOUNTER — HOSPITAL ENCOUNTER (OUTPATIENT)
Dept: PHYSICAL THERAPY | Age: 45
Setting detail: THERAPIES SERIES
Discharge: HOME | End: 2025-05-21
Payer: COMMERCIAL

## 2025-05-21 PROCEDURE — 97113 AQUATIC THERAPY/EXERCISES: CPT

## 2025-05-21 NOTE — PROGRESS NOTES
[facility]  PHYSICAL THERAPY  [] EVALUATION  [] DAILY NOTE (LAND) [x] DAILY NOTE (AQUATIC ) [] PROGRESS NOTE [] DISCHARGE NOTE    [x] OUTPATIENT REHABILITATION CENTER [facility]   [] [facility] CARE [place]    [] [facility]   [] [facility]    Date: 2025  Patient Name:  Ivon Russ  : 1980  MRN: [id number]  [facility]: [id number]    Referring Practitioner Joan Bales, ELIZABETH - CNP [phone]      Diagnosis  Diagnoses       Z98.890 (ICD-10-CM) - History of back surgery    M47.816 (ICD-10-CM) - Lumbar spondylosis    M79.18 (ICD-10-CM) - Bilateral myofascial pain    G89.4 (ICD-10-CM) - Chronic pain syndrome    M53.3 (ICD-10-CM) - SI (sacroiliac) joint dysfunction    M47.819 (ICD-10-CM) - Facet arthropathy    M51.362 (ICD-10-CM) - Degeneration of intervertebral disc of lumbar region with discogenic back pain and lower extremity pain           Treatment Diagnosis M54.59  Other Low Back Pain     Date of Evaluation 25   Additional Pertinent History Ivon Russ has a past medical history of Asthma, Depression, High blood pressure, Irregular heart rate, and Migraines.  she has a past surgical history that includes  section; knee surgery (Left); Tubal ligation; Back Injection (Bilateral, 2022); Pain management procedure (Bilateral, 2023); lumbar fusion (N/A, 2023); Lumbar disc surgery (2023); and Urethral Surgery (N/A, 2023).     Allergies No Known Allergies   Medications   Current Outpatient Medications:     ALLERGEN EXTRACT, Inject as directed Twice a Week, Disp: , Rfl:     beclomethasone (QVAR REDIHALER) 80 MCG/ACT AERB inhaler, Inhale 1 puff into the lungs in the morning and 1 puff in the evening., Disp: 1 each, Rfl: 2    albuterol sulfate HFA (VENTOLIN HFA) 108 (90 Base) MCG/ACT inhaler, Inhale 2 puffs into the lungs 4 times daily as needed for Wheezing or Shortness of Breath, Disp: 54 g, Rfl: 1    cetirizine (ZYRTEC)

## 2025-05-27 ENCOUNTER — CLINICAL SUPPORT (OUTPATIENT)
Dept: ALLERGY | Age: 45
End: 2025-05-27
Payer: COMMERCIAL

## 2025-05-27 VITALS
RESPIRATION RATE: 18 BRPM | SYSTOLIC BLOOD PRESSURE: 117 MMHG | OXYGEN SATURATION: 98 % | HEART RATE: 74 BPM | DIASTOLIC BLOOD PRESSURE: 72 MMHG

## 2025-05-27 DIAGNOSIS — J30.9 CHRONIC ALLERGIC RHINITIS: ICD-10-CM

## 2025-05-27 DIAGNOSIS — J30.1 SEASONAL ALLERGIC RHINITIS DUE TO POLLEN: Primary | ICD-10-CM

## 2025-05-27 PROCEDURE — 95117 IMMUNOTHERAPY INJECTIONS: CPT | Performed by: NURSE PRACTITIONER

## 2025-05-27 NOTE — PROGRESS NOTES
PATIENT HAS THE FOLLOWING DIAGNOSIS SUPPORTING ADMINISTRATION OF ALLERGY INJECTIONS: J30.1Allergic rhinitis due to pollen  AND 21169 MULTIPLE ALLERGY INJECTIONS FOR ALLERGY INJECTION ADMINISTRATION      Patient here for allergy injection today.  Patient was presented with the opportunity to speak with provider and ask questions regarding allergy injections.  Patient was also instructed they need to wait 30 minutes after receiving allergy injections. All risks associated with potential adverse effects have been explained to patient and patient handout was provided following allergy testing.    After consent obtained/verified, allergy injection subcutaneously given in back of arm(s).  Please see below for specific details of site injections, concentration of serum, and volume injected.    VIAL COLOR OF ALL VIALS TODAY IS silver 1:10,000. Vial allergy w/v concentration today is: 1:10,000     ALLERGY INJECTION FROM VIAL A GIVEN right  UPPER ARM IN THE AMOUNT OF 0.10 ML    ALLERGY INJECTION FROM VIAL B GIVEN left lower ARM IN THE AMOUNT OF 0.10  ML    ALLERGY INJECTION FROM VIAL C GIVEN leftupper ARM IN THE AMOUNT OF 0.10 ML      Documentation of vial injection specific to arm(s) noted on Allergy Immunotherapy Administration Form.       Patient waited 30 minutes for observation.No  Patient has received information and verbalizes understanding of the potential  health risks associated with allergy injections . Patient understands risks including anaphylaxis and chooses not to wait 30 minutes after administration of allergy injection(s).    Patient tolerated well without adverse reaction while the patient was in the office.    SHOT REACTION TREATMENT INSTRUCTIONS    During the 30 minute wait after an allergy injection the following symptoms should be reported:    Itching other than at the injection site  Hives or swelling other than at the injection site  Redness other than at the injection site  Difficulty

## 2025-05-28 ENCOUNTER — HOSPITAL ENCOUNTER (OUTPATIENT)
Dept: PHYSICAL THERAPY | Age: 45
Setting detail: THERAPIES SERIES
Discharge: HOME | End: 2025-05-28
Payer: COMMERCIAL

## 2025-05-28 PROCEDURE — 97110 THERAPEUTIC EXERCISES: CPT

## 2025-05-28 PROCEDURE — 97530 THERAPEUTIC ACTIVITIES: CPT

## 2025-05-28 NOTE — DISCHARGE SUMMARY
[facility]  PHYSICAL THERAPY  [] EVALUATION  [] DAILY NOTE (LAND) [] DAILY NOTE (AQUATIC ) [] PROGRESS NOTE [x] DISCHARGE NOTE    [x] OUTPATIENT REHABILITATION CENTER [facility]   [] [facility] CARE [place]    [] [facility]   [] [facility]    Date: 2025  Patient Name:  Ivon Russ  : 1980  MRN: [id number]  [facility]: [id number]    Referring Practitioner Joan Bales, ELIZABETH - CNP [phone]      Diagnosis  Diagnoses       Z98.890 (ICD-10-CM) - History of back surgery    M47.816 (ICD-10-CM) - Lumbar spondylosis    M79.18 (ICD-10-CM) - Bilateral myofascial pain    G89.4 (ICD-10-CM) - Chronic pain syndrome    M53.3 (ICD-10-CM) - SI (sacroiliac) joint dysfunction    M47.819 (ICD-10-CM) - Facet arthropathy    M51.362 (ICD-10-CM) - Degeneration of intervertebral disc of lumbar region with discogenic back pain and lower extremity pain           Treatment Diagnosis M54.59  Other Low Back Pain     Date of Evaluation 25   Additional Pertinent History Ivon Russ has a past medical history of Asthma, Depression, High blood pressure, Irregular heart rate, and Migraines.  she has a past surgical history that includes  section; knee surgery (Left); Tubal ligation; Back Injection (Bilateral, 2022); Pain management procedure (Bilateral, 2023); lumbar fusion (N/A, 2023); Lumbar disc surgery (2023); and Urethral Surgery (N/A, 2023).     Allergies No Known Allergies   Medications   Current Outpatient Medications:     ALLERGEN EXTRACT, Inject as directed Twice a Week, Disp: , Rfl:     beclomethasone (QVAR REDIHALER) 80 MCG/ACT AERB inhaler, Inhale 1 puff into the lungs in the morning and 1 puff in the evening., Disp: 1 each, Rfl: 2    albuterol sulfate HFA (VENTOLIN HFA) 108 (90 Base) MCG/ACT inhaler, Inhale 2 puffs into the lungs 4 times daily as needed for Wheezing or Shortness of Breath, Disp: 54 g, Rfl: 1    cetirizine (ZYRTEC)

## 2025-05-29 ENCOUNTER — CLINICAL SUPPORT (OUTPATIENT)
Dept: ALLERGY | Age: 45
End: 2025-05-29
Payer: COMMERCIAL

## 2025-05-29 VITALS
RESPIRATION RATE: 18 BRPM | HEART RATE: 79 BPM | DIASTOLIC BLOOD PRESSURE: 85 MMHG | SYSTOLIC BLOOD PRESSURE: 117 MMHG | OXYGEN SATURATION: 98 %

## 2025-05-29 DIAGNOSIS — J30.9 CHRONIC ALLERGIC RHINITIS: ICD-10-CM

## 2025-05-29 DIAGNOSIS — J30.1 SEASONAL ALLERGIC RHINITIS DUE TO POLLEN: Primary | ICD-10-CM

## 2025-05-29 PROCEDURE — 95117 IMMUNOTHERAPY INJECTIONS: CPT | Performed by: NURSE PRACTITIONER

## 2025-05-29 NOTE — PROGRESS NOTES
PATIENT HAS THE FOLLOWING DIAGNOSIS SUPPORTING ADMINISTRATION OF ALLERGY INJECTIONS: J30.1Allergic rhinitis due to pollen  AND 65551 MULTIPLE ALLERGY INJECTIONS FOR ALLERGY INJECTION ADMINISTRATION      Patient here for allergy injection today.  Patient was presented with the opportunity to speak with provider and ask questions regarding allergy injections.  Patient was also instructed they need to wait 30 minutes after receiving allergy injections. All risks associated with potential adverse effects have been explained to patient and patient handout was provided following allergy testing.    After consent obtained/verified, allergy injection subcutaneously given in back of arm(s).  Please see below for specific details of site injections, concentration of serum, and volume injected.    VIAL COLOR OF ALL VIALS TODAY IS silver 1:10,000. Vial allergy w/v concentration today is: 1:10,000     ALLERGY INJECTION FROM VIAL A GIVEN left  UPPER ARM IN THE AMOUNT OF 0.15 ML    ALLERGY INJECTION FROM VIAL B GIVEN right upper ARM IN THE AMOUNT OF 0.15  ML    ALLERGY INJECTION FROM VIAL C GIVEN rightlower ARM IN THE AMOUNT OF 0.15 ML      Documentation of vial injection specific to arm(s) noted on Allergy Immunotherapy Administration Form.       Patient waited 30 minutes for observation.No  Patient has received information and verbalizes understanding of the potential  health risks associated with allergy injections . Patient understands risks including anaphylaxis and chooses not to wait 30 minutes after administration of allergy injection(s).    Patient tolerated well without adverse reaction while the patient was in the office.    SHOT REACTION TREATMENT INSTRUCTIONS    During the 30 minute wait after an allergy injection the following symptoms should be reported:    Itching other than at the injection site  Hives or swelling other than at the injection site  Redness other than at the injection site  Difficulty

## 2025-06-17 ENCOUNTER — HOSPITAL ENCOUNTER (OUTPATIENT)
Dept: PULMONOLOGY | Age: 45
Discharge: HOME | End: 2025-06-17
Payer: COMMERCIAL

## 2025-06-17 DIAGNOSIS — J45.40 MODERATE PERSISTENT REACTIVE AIRWAY DISEASE WITHOUT COMPLICATION: ICD-10-CM

## 2025-06-17 PROCEDURE — 6360000002 HC RX W HCPCS

## 2025-06-17 PROCEDURE — 94070 EVALUATION OF WHEEZING: CPT

## 2025-06-19 DIAGNOSIS — G89.4 CHRONIC PAIN SYNDROME: ICD-10-CM

## 2025-06-19 DIAGNOSIS — M47.816 LUMBAR SPONDYLOSIS: ICD-10-CM

## 2025-06-19 DIAGNOSIS — Z98.890 HISTORY OF BACK SURGERY: ICD-10-CM

## 2025-06-19 DIAGNOSIS — M53.3 SI (SACROILIAC) JOINT DYSFUNCTION: ICD-10-CM

## 2025-06-19 DIAGNOSIS — M79.18 BILATERAL MYOFASCIAL PAIN: ICD-10-CM

## 2025-06-19 DIAGNOSIS — G95.0 SYRINX OF SPINAL CORD (HCC): ICD-10-CM

## 2025-06-19 RX ORDER — PREGABALIN 100 MG/1
100 CAPSULE ORAL
Qty: 30 CAPSULE | Refills: 2 | Status: SHIPPED | OUTPATIENT
Start: 2025-06-19 | End: 2025-09-17

## 2025-06-19 RX ORDER — PREGABALIN 200 MG/1
200 CAPSULE ORAL NIGHTLY
Qty: 30 CAPSULE | Refills: 2 | Status: SHIPPED | OUTPATIENT
Start: 2025-06-19 | End: 2025-09-17

## 2025-06-23 ENCOUNTER — RESULTS FOLLOW-UP (OUTPATIENT)
Dept: ALLERGY | Age: 45
End: 2025-06-23

## 2025-06-30 ENCOUNTER — TELEPHONE (OUTPATIENT)
Dept: ALLERGY | Age: 45
End: 2025-06-30

## 2025-07-03 ENCOUNTER — CLINICAL SUPPORT (OUTPATIENT)
Dept: ALLERGY | Age: 45
End: 2025-07-03

## 2025-07-03 VITALS
OXYGEN SATURATION: 100 % | SYSTOLIC BLOOD PRESSURE: 129 MMHG | RESPIRATION RATE: 16 BRPM | HEART RATE: 85 BPM | DIASTOLIC BLOOD PRESSURE: 86 MMHG

## 2025-07-03 DIAGNOSIS — J30.9 CHRONIC ALLERGIC RHINITIS: ICD-10-CM

## 2025-07-03 DIAGNOSIS — J30.1 SEASONAL ALLERGIC RHINITIS DUE TO POLLEN: Primary | ICD-10-CM

## 2025-07-03 NOTE — PROGRESS NOTES
PATIENT HAS THE FOLLOWING DIAGNOSIS SUPPORTING ADMINISTRATION OF ALLERGY INJECTIONS: J30.1Allergic rhinitis due to pollen  AND 81222 MULTIPLE ALLERGY INJECTIONS FOR ALLERGY INJECTION ADMINISTRATION      Patient here for allergy injection today.  Patient was presented with the opportunity to speak with provider and ask questions regarding allergy injections.  Patient was also instructed they need to wait 30 minutes after receiving allergy injections. All risks associated with potential adverse effects have been explained to patient and patient handout was provided following allergy testing.    After consent obtained/verified, allergy injection subcutaneously given in back of arm(s).  Please see below for specific details of site injections, concentration of serum, and volume injected.    VIAL COLOR OF ALL VIALS TODAY IS silver 1:10,000 concentration    ALLERGY INJECTION FROM VIAL A GIVEN right  UPPER ARM IN THE AMOUNT OF 0.20 ML    ALLERGY INJECTION FROM VIAL B GIVEN left upper ARM IN THE AMOUNT OF 0.20  ML    ALLERGY INJECTION FROM VIAL C GIVEN leftlower ARM IN THE AMOUNT OF 0.20 ML    Documentation of vial injection specific to arm(s) noted on Allergy Immunotherapy Administration Form.       Patient waited 30 minutes for observation.No  Patient has received information and verbalizes understanding of the potential  health risks associated with allergy injections . Patient understands risks including anaphylaxis and chooses not to wait 30 minutes after administration of allergy injection(s).    Patient tolerated well without adverse reaction while the patient was in the office.    SHOT REACTION TREATMENT INSTRUCTIONS    During the 30 minute wait after an allergy injection the following symptoms should be reported:    Itching other than at the injection site  Hives or swelling other than at the injection site  Redness other than at the injection site  Difficulty breathing  Chest tightness  Difficulty

## 2025-07-07 ENCOUNTER — CLINICAL SUPPORT (OUTPATIENT)
Dept: ALLERGY | Age: 45
End: 2025-07-07
Payer: COMMERCIAL

## 2025-07-07 ENCOUNTER — OFFICE VISIT (OUTPATIENT)
Dept: PHYSICAL MEDICINE AND REHAB | Age: 45
End: 2025-07-07
Payer: COMMERCIAL

## 2025-07-07 VITALS
RESPIRATION RATE: 16 BRPM | SYSTOLIC BLOOD PRESSURE: 133 MMHG | OXYGEN SATURATION: 100 % | HEART RATE: 68 BPM | DIASTOLIC BLOOD PRESSURE: 85 MMHG

## 2025-07-07 VITALS — DIASTOLIC BLOOD PRESSURE: 82 MMHG | SYSTOLIC BLOOD PRESSURE: 124 MMHG

## 2025-07-07 DIAGNOSIS — J30.1 SEASONAL ALLERGIC RHINITIS DUE TO POLLEN: Primary | ICD-10-CM

## 2025-07-07 DIAGNOSIS — G89.4 CHRONIC PAIN SYNDROME: Primary | ICD-10-CM

## 2025-07-07 DIAGNOSIS — M54.17 RADICULOPATHY, LUMBOSACRAL REGION: ICD-10-CM

## 2025-07-07 DIAGNOSIS — J30.9 CHRONIC ALLERGIC RHINITIS: ICD-10-CM

## 2025-07-07 DIAGNOSIS — M47.816 LUMBAR SPONDYLOSIS: ICD-10-CM

## 2025-07-07 DIAGNOSIS — M79.18 BILATERAL MYOFASCIAL PAIN: ICD-10-CM

## 2025-07-07 DIAGNOSIS — M53.3 SI (SACROILIAC) JOINT DYSFUNCTION: ICD-10-CM

## 2025-07-07 DIAGNOSIS — Z98.890 HISTORY OF BACK SURGERY: ICD-10-CM

## 2025-07-07 PROCEDURE — G8427 DOCREV CUR MEDS BY ELIG CLIN: HCPCS | Performed by: NURSE PRACTITIONER

## 2025-07-07 PROCEDURE — G8417 CALC BMI ABV UP PARAM F/U: HCPCS | Performed by: NURSE PRACTITIONER

## 2025-07-07 PROCEDURE — 95117 IMMUNOTHERAPY INJECTIONS: CPT | Performed by: NURSE PRACTITIONER

## 2025-07-07 PROCEDURE — 99214 OFFICE O/P EST MOD 30 MIN: CPT | Performed by: NURSE PRACTITIONER

## 2025-07-07 PROCEDURE — 1036F TOBACCO NON-USER: CPT | Performed by: NURSE PRACTITIONER

## 2025-07-07 NOTE — PROGRESS NOTES
Chronic Pain/PM&R Clinic Note     Encounter Date: 7/7/25    Subjective:   Chief Complaint:   Chief Complaint   Patient presents with    Follow-up     12 week follow up   Pain staying the same. Wants to schedule injection today.   Having left knee replacement soon per patient.        History of Present Illness:   Ivon Russ is a 44 y.o. female seen in the clinic initially on 11/10/2022 upon request from Shubham Castrejon PA  for her history of low back pain. Patient states her pain started about one year ago without any inciting event. 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 She wonders if this repetitive work has lead to her current issues. She states her pain has been getting gradually worse. Pain is aggravated by sitting, walking or standing for any amount of time. She states she has not found anything to help with her pain. She does get some pain radiating into the posterior aspect of bilateral thighs. She denies history of falls. She does not use an assistive device for ambulation. She does not notice trouble with balance but does feels like she has tripped over her own feet more recently. She states she has not been sleeping well due to not being able to gt comfortable. She pursued physical therapy at [facility] a couple months ago. Physical therapy aggravated her pain significantly. She does feel like she has weakness in her legs, the left greater than her right. She states she cannot lift her legs due to pain in her back. She denies any saddle anesthesia or bowel/bladder incontinence.     Today, 10/24/2023, patient presents for planned follow-up on chronic pain.  She states overall she has been doing relatively well since having back surgery back in February.  She states she does have some issues with restless legs at night.  She states her legs feel like they want to stretch out and then cannot move.  She states it is hard to explain.

## 2025-07-07 NOTE — PROGRESS NOTES
Functionality Assessment/Goals Worksheet     On a scale of 0 (Does not Interfere) to 10 (Completely Interferes)     1.  Which number describes how during the past week pain has interfered with           the following:  A.  General Activity:  7  B.  Mood: 4  C.  Walking Ability:  7  D.  Normal Work (Includes both work outside the home and housework):  7  E.  Relations with Other People:   3  F.  Sleep:   7  G.  Enjoyment of Life:   7    2.  Patient Prefers to Take their Pain Medications:     []  On a regular basis   []  Only when necessary    [x]  Does not take pain medications    3.  What are the Patient's Goals/Expectations for Visiting Pain Management?     []  Learn about my pain    []  Receive Medication   []  Physical Therapy     []  Treat Depression   []  Receive Injections    []  Treat Sleep   []  Deal with Anxiety and Stress   []  Treat Opoid Dependence/Addiction   [x]  Other: \"To be pain free\"

## 2025-07-07 NOTE — PROGRESS NOTES
PATIENT HAS THE FOLLOWING DIAGNOSIS SUPPORTING ADMINISTRATION OF ALLERGY INJECTIONS: J30.1Allergic rhinitis due to pollen  AND 85288 MULTIPLE ALLERGY INJECTIONS FOR ALLERGY INJECTION ADMINISTRATION      Patient here for allergy injection today.  Patient was presented with the opportunity to speak with provider and ask questions regarding allergy injections.  Patient was also instructed they need to wait 30 minutes after receiving allergy injections. All risks associated with potential adverse effects have been explained to patient and patient handout was provided following allergy testing.    After consent obtained/verified, allergy injection subcutaneously given in back of arm(s).  Please see below for specific details of site injections, concentration of serum, and volume injected.    VIAL COLOR OF ALL VIALS TODAY IS silver 1:10,000 concentration    ALLERGY INJECTION FROM VIAL A GIVEN left  UPPER ARM IN THE AMOUNT OF 0.25 ML    ALLERGY INJECTION FROM VIAL B GIVEN right lower ARM IN THE AMOUNT OF 0.25  ML    ALLERGY INJECTION FROM VIAL C GIVEN rightupper ARM IN THE AMOUNT OF 0.25 ML    Documentation of vial injection specific to arm(s) noted on Allergy Immunotherapy Administration Form.       Patient waited 30 minutes for observation.No  Patient has received information and verbalizes understanding of the potential  health risks associated with allergy injections . Patient understands risks including anaphylaxis and chooses not to wait 30 minutes after administration of allergy injection(s).    Patient tolerated well without adverse reaction while the patient was in the office.    SHOT REACTION TREATMENT INSTRUCTIONS    During the 30 minute wait after an allergy injection the following symptoms should be reported:    Itching other than at the injection site  Hives or swelling other than at the injection site  Redness other than at the injection site  Difficulty breathing  Chest tightness  Difficulty

## 2025-07-09 ENCOUNTER — CLINICAL SUPPORT (OUTPATIENT)
Dept: ALLERGY | Age: 45
End: 2025-07-09
Payer: COMMERCIAL

## 2025-07-09 VITALS
RESPIRATION RATE: 18 BRPM | OXYGEN SATURATION: 98 % | DIASTOLIC BLOOD PRESSURE: 87 MMHG | SYSTOLIC BLOOD PRESSURE: 143 MMHG | HEART RATE: 67 BPM

## 2025-07-09 DIAGNOSIS — J30.1 SEASONAL ALLERGIC RHINITIS DUE TO POLLEN: Primary | ICD-10-CM

## 2025-07-09 DIAGNOSIS — J30.9 CHRONIC ALLERGIC RHINITIS: ICD-10-CM

## 2025-07-09 PROCEDURE — 95117 IMMUNOTHERAPY INJECTIONS: CPT | Performed by: NURSE PRACTITIONER

## 2025-07-09 NOTE — PROGRESS NOTES
PATIENT HAS THE FOLLOWING DIAGNOSIS SUPPORTING ADMINISTRATION OF ALLERGY INJECTIONS: J30.1Allergic rhinitis due to pollen  AND 88408 MULTIPLE ALLERGY INJECTIONS FOR ALLERGY INJECTION ADMINISTRATION      Patient here for allergy injection today.  Patient was presented with the opportunity to speak with provider and ask questions regarding allergy injections.  Patient was also instructed they need to wait 30 minutes after receiving allergy injections. All risks associated with potential adverse effects have been explained to patient and patient handout was provided following allergy testing.    After consent obtained/verified, allergy injection subcutaneously given in back of arm(s).  Please see below for specific details of site injections, concentration of serum, and volume injected.    VIAL COLOR OF ALL VIALS TODAY IS silver 1:10,000 concentration    ALLERGY INJECTION FROM VIAL A GIVEN right  UPPER ARM IN THE AMOUNT OF 0.30 ML    ALLERGY INJECTION FROM VIAL B GIVEN left lower ARM IN THE AMOUNT OF 0.30  ML    ALLERGY INJECTION FROM VIAL C GIVEN leftupper ARM IN THE AMOUNT OF 0.30 ML    Documentation of vial injection specific to arm(s) noted on Allergy Immunotherapy Administration Form.       Patient waited 30 minutes for observation.No  Patient has received information and verbalizes understanding of the potential  health risks associated with allergy injections . Patient understands risks including anaphylaxis and chooses not to wait 30 minutes after administration of allergy injection(s).    Patient tolerated well without adverse reaction while the patient was in the office.    SHOT REACTION TREATMENT INSTRUCTIONS    During the 30 minute wait after an allergy injection the following symptoms should be reported:    Itching other than at the injection site  Hives or swelling other than at the injection site  Redness other than at the injection site  Difficulty breathing  Chest tightness  Difficulty

## 2025-07-10 ENCOUNTER — TELEPHONE (OUTPATIENT)
Dept: PHYSICAL MEDICINE AND REHAB | Age: 45
End: 2025-07-10

## 2025-07-10 NOTE — TELEPHONE ENCOUNTER
Can you put in new orders for this patient? Looks like it should be #[id number]. It is from your 07/07/25 encounter.

## 2025-07-22 ENCOUNTER — OFFICE VISIT (OUTPATIENT)
Dept: ALLERGY | Age: 45
End: 2025-07-22
Payer: COMMERCIAL

## 2025-07-22 ENCOUNTER — CLINICAL SUPPORT (OUTPATIENT)
Dept: ALLERGY | Age: 45
End: 2025-07-22
Payer: COMMERCIAL

## 2025-07-22 VITALS
WEIGHT: 188 LBS | BODY MASS INDEX: 33.31 KG/M2 | HEART RATE: 74 BPM | DIASTOLIC BLOOD PRESSURE: 81 MMHG | HEIGHT: 63 IN | OXYGEN SATURATION: 96 % | RESPIRATION RATE: 19 BRPM | SYSTOLIC BLOOD PRESSURE: 117 MMHG

## 2025-07-22 VITALS
HEART RATE: 74 BPM | SYSTOLIC BLOOD PRESSURE: 117 MMHG | OXYGEN SATURATION: 96 % | RESPIRATION RATE: 19 BRPM | DIASTOLIC BLOOD PRESSURE: 81 MMHG

## 2025-07-22 DIAGNOSIS — J45.40 MODERATE PERSISTENT ASTHMA WITHOUT COMPLICATION: Primary | ICD-10-CM

## 2025-07-22 DIAGNOSIS — J30.1 SEASONAL ALLERGIC RHINITIS DUE TO POLLEN: ICD-10-CM

## 2025-07-22 DIAGNOSIS — J30.9 CHRONIC ALLERGIC RHINITIS: Primary | ICD-10-CM

## 2025-07-22 PROCEDURE — 95117 IMMUNOTHERAPY INJECTIONS: CPT | Performed by: NURSE PRACTITIONER

## 2025-07-22 PROCEDURE — 1036F TOBACCO NON-USER: CPT | Performed by: NURSE PRACTITIONER

## 2025-07-22 PROCEDURE — G8427 DOCREV CUR MEDS BY ELIG CLIN: HCPCS | Performed by: NURSE PRACTITIONER

## 2025-07-22 PROCEDURE — A4617 MOUTH PIECE: HCPCS | Performed by: NURSE PRACTITIONER

## 2025-07-22 PROCEDURE — 99214 OFFICE O/P EST MOD 30 MIN: CPT | Performed by: NURSE PRACTITIONER

## 2025-07-22 PROCEDURE — G8417 CALC BMI ABV UP PARAM F/U: HCPCS | Performed by: NURSE PRACTITIONER

## 2025-07-22 RX ORDER — ALBUTEROL SULFATE 90 UG/1
2 INHALANT RESPIRATORY (INHALATION) 4 TIMES DAILY PRN
Qty: 54 G | Refills: 1 | Status: SHIPPED | OUTPATIENT
Start: 2025-07-22

## 2025-07-22 RX ORDER — BUDESONIDE AND FORMOTEROL FUMARATE DIHYDRATE 160; 4.5 UG/1; UG/1
2 AEROSOL RESPIRATORY (INHALATION) 2 TIMES DAILY
Qty: 1 EACH | Refills: 5 | Status: SHIPPED | OUTPATIENT
Start: 2025-07-22

## 2025-07-22 ASSESSMENT — ENCOUNTER SYMPTOMS: SHORTNESS OF BREATH: 1

## 2025-07-22 NOTE — PROGRESS NOTES
PATIENT HAS THE FOLLOWING DIAGNOSIS SUPPORTING ADMINISTRATION OF ALLERGY INJECTIONS: J30.1Allergic rhinitis due to pollen  AND 27521 MULTIPLE ALLERGY INJECTIONS FOR ALLERGY INJECTION ADMINISTRATION      Patient here for allergy injection today.  Patient was presented with the opportunity to speak with provider and ask questions regarding allergy injections.  Patient was also instructed they need to wait 30 minutes after receiving allergy injections. All risks associated with potential adverse effects have been explained to patient and patient handout was provided following allergy testing.    After consent obtained/verified, allergy injection subcutaneously given in back of arm(s).  Please see below for specific details of site injections, concentration of serum, and volume injected.    VIAL COLOR OF ALL VIALS TODAY IS silver 1:10,000 concentration    ALLERGY INJECTION FROM VIAL A GIVEN left  UPPER ARM IN THE AMOUNT OF 0.15 ML    ALLERGY INJECTION FROM VIAL B GIVEN right upper ARM IN THE AMOUNT OF 0.15  ML    ALLERGY INJECTION FROM VIAL C GIVEN rightlower ARM IN THE AMOUNT OF 0.15 ML    Documentation of vial injection specific to arm(s) noted on Allergy Immunotherapy Administration Form.       Patient waited 30 minutes for observation.Yes  Patient has received information and verbalizes understanding of the potential  health risks associated with allergy injections . Patient understands risks including anaphylaxis and chooses not to wait 30 minutes after administration of allergy injection(s).    Patient tolerated well without adverse reaction while the patient was in the office.    SHOT REACTION TREATMENT INSTRUCTIONS    During the 30 minute wait after an allergy injection the following symptoms should be reported:    Itching other than at the injection site  Hives or swelling other than at the injection site  Redness other than at the injection site  Difficulty breathing  Chest tightness  Difficulty

## 2025-07-22 NOTE — PROGRESS NOTES
[facility]@    Allergy & Asthma   [address]  [place] [address]  Ph:   [phone]  Fax:[phone]     Provider:  Dr. Crystal Parkinson    Follow Up         Ivon was seen today for results, asthma, follow-up and medication refill.    Diagnoses and all orders for this visit:    Moderate persistent asthma without complication  -     MOUTH PIECE  -     NITRIC OXIDE  GAS DETERMINATION  -     budesonide-formoterol (SYMBICORT) 160-4.5 MCG/ACT AERO; Inhale 2 puffs into the lungs 2 times daily    Other orders  -     albuterol sulfate HFA (VENTOLIN HFA) 108 (90 Base) MCG/ACT inhaler; Inhale 2 puffs into the lungs 4 times daily as needed for Wheezing or Shortness of Breath        CHIEF COMPLAINT:   Chief Complaint   Patient presents with    Results     3 month f/u Radiology Review (ct of chest not done), PFT review.    Asthma     3 month f/u    Follow-up     3 month f/u allergy injections    Medication Refill       HISTORY OF PRESENT ILLNESS: ESTABLISHED PATIENT HERE FOR EVALUATION         Ivon Russ is a 44 y.o. female is here for immunotherapy follow-up. Patient has been in silver color vial  twice EVERY 1 WEEK.  Immunotherapy treatment has not been changed since starting date..  There have been  no local reactions to immunotherapy injections. There have been no systemic reactions to IT. It is too soon for patient to have notice a reduction in allergy symptoms. Since beginning immunotherapy no new allergy symptoms have developed.  She does not suspect any new allergen sensitization.  During the last year patient denies starting on a beta-blocker medication. However, patient understands to report on any medication and beta-blockers if they should be placed on this type of medication.  They understand it does interfere with allergy injections. She would like to continue immunotherapy.  Patient has found immunotherapy to be very beneficial in controlling allergic rhinitis, symptoms.  It is medically

## 2025-07-23 NOTE — DISCHARGE INSTRUCTIONS
Post procedure Instructions:    No driving or making significant decisions for the remainder of the day.   Be cautions with walking and activity for 24 hours, do not over exert yourself.  Ok to resume pre-procedure activity level today.  Apply ice to site of injection site if you have pain or discomfort.  Do not submerge sit of injection during bath or pool activities for 48 hours post-procedure.  Resume blood thinning medications in 24 hours.     Call office [phone] if you have:  Temperature greater than 100.4  Persistent nausea and vomiting  Severe uncontrolled pain  Redness, tenderness, or signs of infection (pain, swelling, redness, odor or green/yellow discharge around the site)  Difficulty breathing, headache or visual disturbances  Hives  Persistent dizziness or light-headedness  Extreme fatigue  Any other questions or concerns you may have after discharge    In an emergency, call 911 or go to an Emergency Department at a nearby hospital    “Surgical Site Infections”      How can we work together to prevent Surgical Site Infections?   We would like to thank you for choosing [facility] for your Surgical Care.  Below you will find helpful information on how we can work together to prevent Surgical Site Infections.    What is a Surgical Site Infection (SSI)?  A surgical site infection is an infection that occurs after surgery in the part of the body where the surgery took place. Most patients who have surgery do not develop an infection. However, infections develop in about 1 to 3 out of every 100 patients who have surgery.  Some of the common symptoms of a surgical site infection are:  Redness and pain around the area where you had surgery  Drainage of cloudy fluid from your surgical wound  Fever    Can SSIs be treated?  Yes. Most surgical site infections can be treated with antibiotics. The antibiotic given to you depends on the bacteria (germs) causing the infection. Sometimes patients with  SSIs also need another surgery to treat the infection.    What are some of the things that hospitals are doing to prevent SSIs?  To prevent SSIs, doctors, nurses, and other healthcare providers:  May remove some of your hair immediately before your surgery using electric clippers if the hair is in the same area where the procedure will occur. They should not shave you with a razor.  Give you antibiotics before your surgery starts. In most cases, you should get antibiotics within 60 minutes before the surgery starts and the antibiotics should be stopped within 24 hours after surgery.  Clean the skin at the site of your surgery with a special soap that kills germs.  Clean their hands and arms up to their elbows with an antiseptic agent just before the surgery.  Wear special hair covers, masks, gowns, and gloves during surgery to  keep the surgery area clean.  Clean their hands with soap and water or an alcohol-based hand rub before and after caring for each patient.             If you do not see your providers clean their hands, please     ask  them to do so.     What can I do to help prevent SSIs?  Before your surgery:  Tell your doctor about other medical problems you may have.  Health problems such as allergies, diabetes, and obesity could affect your surgery and your treatment.   Quit smoking. Patients who smoke get more infections. Talk to your doctor about how you can quit before your surgery.  Do not shave near where you will have surgery. Shaving with a razor can irritate your skin and make it easier to develop an infection.  At the time of your surgery:  Speak up if someone tries to shave you with a razor before surgery. Ask why you need to be shaved and talk with your surgeon if you have any concerns.  Ask if you will get antibiotics before surgery.  After your surgery:  Make sure that your healthcare providers clean their hands before examining you, either with soap and water or an alcohol-based hand

## 2025-07-24 ENCOUNTER — CLINICAL SUPPORT (OUTPATIENT)
Dept: ALLERGY | Age: 45
End: 2025-07-24
Payer: COMMERCIAL

## 2025-07-24 VITALS
SYSTOLIC BLOOD PRESSURE: 123 MMHG | RESPIRATION RATE: 16 BRPM | HEART RATE: 74 BPM | DIASTOLIC BLOOD PRESSURE: 93 MMHG | OXYGEN SATURATION: 100 %

## 2025-07-24 DIAGNOSIS — J30.1 SEASONAL ALLERGIC RHINITIS DUE TO POLLEN: Primary | ICD-10-CM

## 2025-07-24 DIAGNOSIS — J30.9 CHRONIC ALLERGIC RHINITIS: ICD-10-CM

## 2025-07-24 PROCEDURE — 95117 IMMUNOTHERAPY INJECTIONS: CPT | Performed by: NURSE PRACTITIONER

## 2025-07-24 NOTE — PROGRESS NOTES
PATIENT HAS THE FOLLOWING DIAGNOSIS SUPPORTING ADMINISTRATION OF ALLERGY INJECTIONS: J30.1Allergic rhinitis due to pollen  AND 18748 MULTIPLE ALLERGY INJECTIONS FOR ALLERGY INJECTION ADMINISTRATION      Patient here for allergy injection today.  Patient was presented with the opportunity to speak with provider and ask questions regarding allergy injections.  Patient was also instructed they need to wait 30 minutes after receiving allergy injections. All risks associated with potential adverse effects have been explained to patient and patient handout was provided following allergy testing.    After consent obtained/verified, allergy injection subcutaneously given in back of arm(s).  Please see below for specific details of site injections, concentration of serum, and volume injected.    VIAL COLOR OF ALL VIALS TODAY IS silver 1:10,000 concentration    ALLERGY INJECTION FROM VIAL A GIVEN right  UPPER ARM IN THE AMOUNT OF 0.20 ML    ALLERGY INJECTION FROM VIAL B GIVEN left upper ARM IN THE AMOUNT OF 0.20  ML    ALLERGY INJECTION FROM VIAL C GIVEN leftupper, lower ARM IN THE AMOUNT OF 0.20 ML    Documentation of vial injection specific to arm(s) noted on Allergy Immunotherapy Administration Form.       Patient waited 30 minutes for observation.No  Patient has received information and verbalizes understanding of the potential  health risks associated with allergy injections . Patient understands risks including anaphylaxis and chooses not to wait 30 minutes after administration of allergy injection(s).    Patient tolerated well without adverse reaction while the patient was in the office.    SHOT REACTION TREATMENT INSTRUCTIONS    During the 30 minute wait after an allergy injection the following symptoms should be reported:    Itching other than at the injection site  Hives or swelling other than at the injection site  Redness other than at the injection site  Difficulty breathing  Chest tightness  Difficulty

## 2025-07-28 ENCOUNTER — TELEPHONE (OUTPATIENT)
Dept: PHYSICAL MEDICINE AND REHAB | Age: 45
End: 2025-07-28

## 2025-07-28 NOTE — H&P
Flexeril as she does feel it was making her constipated which improved after stopping this medication.  She continues to take Lyrica 200 mg nightly and ibuprofen 800 mg as needed.  She states she was told by neurosurgery that her disc spacer has moved and she is not sure if she is going to need repeat surgery.  She states she has been off for 5 to 6 months at this point and she is considering applying for disability.  She continues to have some numbness in her left leg/toes.  She denies any new leg paresthesias, leg weakness, saddle anesthesia, bowel/bladder incontinence.    Today, 6/14/2024, patient presents for planned follow-up on chronic pain.  She continues to have phenomenal relief from the trigger point injections that we have been doing every 3 months.  She does have ongoing numbness in her legs and is planning to undergo another back surgery with Dr. Kee.  She is waiting to get that set up.  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  They plan on doing an L3 through pelvis fusion.  She continues to take the Lyrica 200 mg nightly which has been beneficial.  She would like to hold off on any other changes.  She denies any new symptoms at this time such as new saddle anesthesia or bowel/bladder incontinence.    Today, 8/30/2024, patient presents for planned follow-up on chronic pain.  Patient states trigger point ejections seem to aggravate her pain last visit.  Prior to that she has had phenomenal relief from trigger point injections and she would like to repeat those in office today.  She is trying to get surgery set up but insurance has denied it twice.  She is scheduled for a CT scan and discogram next week and I will go from there.  Neurosurgery discussed spinal cord stimulation with her but she does not want a device implanted.  She continues to have the low back pain with pain into her legs and numbness in her left foot.  She  lower extremity pain; Arthrodesis status; Radiculopathy, lumbar  region; Postlaminectomy syndrome, not elsewhere classified.     COMPARISON: Lumbar spine CT discogram 2/10/2025.     TECHNIQUE: Sagittal and axial T1 and T2-weighted images were obtained through  the lumbar spine.     FINDINGS:        The patient has bilateral pedicle screws in L5 and S1. These were also present  on the prior CT. There is normal marrow signal throughout.  There is no bone  marrow edema.  There are no compression fractures.  No pars defects are noted.  There is disc desiccation at the L3-4 and L4-5 levels.     There is some minimal prominence of the central canal within the distal thoracic  cord at the T12 level. This is most consistent with an incidental finding. This  does not extend into the distal thoracic cord based on the sagittal images. The  nerve roots of the cauda equina are normal. The tip of the conus has a normal  position.     There are no gross abnormalities in the visualized aspects of the distal  thoracic spine.     On the axial images, at T12-L1 through L2-L3, there are no degenerative changes.  There is no spinal canal or foraminal stenosis.     At L3-L4, there are mild facet degenerative changes. There is a minimal diffuse  disc bulge. There is no spinal canal stenosis. There is no foraminal stenosis.     At L4-L5, there are facet degenerative changes. There is a very mild diffuse  disc bulge. There is an annular tear of the disc. There is mild stenosis of the  thecal sac. There is no significant foraminal stenosis.     At L5-S1, there has been posterior decompression and fusion. There is no  stenosis of the thecal sac. There is no foraminal stenosis.     There are no suspicious findings in the visualized aspects of the  retroperitoneum and paraspinal soft tissues.        IMPRESSION:     1. Mild spinal canal stenosis at the L4-5 level. There is an annular tear to the  disc. There are facet degenerative changes.  2.

## 2025-07-29 ENCOUNTER — APPOINTMENT (OUTPATIENT)
Dept: GENERAL RADIOLOGY | Age: 45
End: 2025-07-29
Attending: ANESTHESIOLOGY
Payer: COMMERCIAL

## 2025-07-29 ENCOUNTER — HOSPITAL ENCOUNTER (OUTPATIENT)
Age: 45
Setting detail: OUTPATIENT SURGERY
Discharge: HOME | End: 2025-07-29
Attending: ANESTHESIOLOGY | Admitting: ANESTHESIOLOGY
Payer: COMMERCIAL

## 2025-07-29 VITALS
HEART RATE: 68 BPM | TEMPERATURE: 97 F | SYSTOLIC BLOOD PRESSURE: 122 MMHG | OXYGEN SATURATION: 98 % | RESPIRATION RATE: 16 BRPM | HEIGHT: 63 IN | BODY MASS INDEX: 32.6 KG/M2 | WEIGHT: 184 LBS | DIASTOLIC BLOOD PRESSURE: 68 MMHG

## 2025-07-29 PROCEDURE — 99152 MOD SED SAME PHYS/QHP 5/>YRS: CPT | Performed by: ANESTHESIOLOGY

## 2025-07-29 PROCEDURE — 6360000002 HC RX W HCPCS: Performed by: ANESTHESIOLOGY

## 2025-07-29 PROCEDURE — 2709999900 HC NON-CHARGEABLE SUPPLY: Performed by: ANESTHESIOLOGY

## 2025-07-29 PROCEDURE — 7100000011 HC PHASE II RECOVERY - ADDTL 15 MIN: Performed by: ANESTHESIOLOGY

## 2025-07-29 PROCEDURE — 7100000010 HC PHASE II RECOVERY - FIRST 15 MIN: Performed by: ANESTHESIOLOGY

## 2025-07-29 PROCEDURE — 2500000003 HC RX 250 WO HCPCS: Performed by: ANESTHESIOLOGY

## 2025-07-29 PROCEDURE — 62323 NJX INTERLAMINAR LMBR/SAC: CPT | Performed by: ANESTHESIOLOGY

## 2025-07-29 PROCEDURE — 6360000004 HC RX CONTRAST MEDICATION: Performed by: ANESTHESIOLOGY

## 2025-07-29 PROCEDURE — 3600000054 HC PAIN LEVEL 3 BASE: Performed by: ANESTHESIOLOGY

## 2025-07-29 RX ORDER — FENTANYL CITRATE 50 UG/ML
INJECTION, SOLUTION INTRAMUSCULAR; INTRAVENOUS PRN
Status: DISCONTINUED | OUTPATIENT
Start: 2025-07-29 | End: 2025-07-29 | Stop reason: ALTCHOICE

## 2025-07-29 RX ORDER — LIDOCAINE HYDROCHLORIDE 10 MG/ML
INJECTION, SOLUTION EPIDURAL; INFILTRATION; INTRACAUDAL; PERINEURAL PRN
Status: DISCONTINUED | OUTPATIENT
Start: 2025-07-29 | End: 2025-07-29 | Stop reason: ALTCHOICE

## 2025-07-29 RX ORDER — ACYCLOVIR 200 MG/1
CAPSULE ORAL PRN
Status: DISCONTINUED | OUTPATIENT
Start: 2025-07-29 | End: 2025-07-29 | Stop reason: ALTCHOICE

## 2025-07-29 RX ORDER — DEXAMETHASONE SODIUM PHOSPHATE 4 MG/ML
INJECTION, SOLUTION INTRA-ARTICULAR; INTRALESIONAL; INTRAMUSCULAR; INTRAVENOUS; SOFT TISSUE PRN
Status: DISCONTINUED | OUTPATIENT
Start: 2025-07-29 | End: 2025-07-29 | Stop reason: ALTCHOICE

## 2025-07-29 RX ORDER — IOPAMIDOL 612 MG/ML
INJECTION, SOLUTION INTRAVASCULAR PRN
Status: DISCONTINUED | OUTPATIENT
Start: 2025-07-29 | End: 2025-07-29 | Stop reason: ALTCHOICE

## 2025-07-29 RX ORDER — MIDAZOLAM HYDROCHLORIDE 1 MG/ML
INJECTION, SOLUTION INTRAMUSCULAR; INTRAVENOUS PRN
Status: DISCONTINUED | OUTPATIENT
Start: 2025-07-29 | End: 2025-07-29 | Stop reason: ALTCHOICE

## 2025-07-29 ASSESSMENT — PAIN SCALES - GENERAL
PAINLEVEL_OUTOF10: 0
PAINLEVEL_OUTOF10: 6

## 2025-07-29 ASSESSMENT — PAIN - FUNCTIONAL ASSESSMENT
PAIN_FUNCTIONAL_ASSESSMENT: 0-10
PAIN_FUNCTIONAL_ASSESSMENT: 0-10

## 2025-07-29 ASSESSMENT — PAIN DESCRIPTION - DESCRIPTORS: DESCRIPTORS: ACHING

## 2025-07-29 NOTE — PROCEDURES
Pre-operative Diagnosis: Lumbar Radiculopathy     Post-operative Diagnosis: Lumbar Radiculopathy     Procedure: Lumbar epidural steroid injection    Procedure Description:  After having obtained a signed informed consent, the patient was taken to the fluoroscopy suite and placed in the prone position. The patient's back was prepped with chloraprep and draped in a sterile fashion.  A total of 1.5 cc of 1 % lidocaine was used to anesthetize the skin and underlying tissues.  Under fluoroscopic guidance, a single 20G Tuohy needle was advanced using midline approach at the L4/L5 interspace until gaining the epidural space using the loss of resistance to saline syringe technique.  There were no paresthesias, heme, or CSF aspiration.  A total of 0.25 cc of Isovue-M 300 were injected having had adequate dye spread within the epidural space. Needle placement and contrast spread was confirmed using the AP and contralateral views.  10 mg of Dexamethasone with 1 cc of saline solution were injected in the epidural space. The needle was flushed and removed without any complication.  The patient tolerated the procedure well and was transported to the recovery room. The patient was observed for 15 minutes to then discharged in an ambulatory fashion.    Procedural Complications: None  Estimated Blood Loss: 0 mL      IV sedation was used during the procedure:  - Moderate intravenous conscious sedation was supervised by Dr. Meade  - The patient was independently monitored by a Registered Nurse assigned to the procedure room  - Monitoring included automated blood pressure, continuous EKG, and continuous pulse oximetry  - The detailed conscious record is permanently stored in the Hospital Information System  - The following is the conscious sedation record:  Start Time: 8:46  End Time : 9:01  Duration: 15 minutes   Medications Administered: 2 mg Versed, 100 mcg Fentanyl       Cristian Meade DO  Interventional Pain Management/PM&R

## 2025-07-29 NOTE — PROGRESS NOTES
0851-Patient to Phase II. Report received from surgical RN. Patient drowsy. Vital signs obtained and stable. Respirations unlabored on room air. Patient denies pain and nausea. Denies numbness and tingling in extremities. Injection site open to air and no drainage noted. Patient instructed to stay in bed. Call light in reach.     0855-snack and drink provided    0900- at bedside. Pt more awake and eating a snack    0910-Repeat VSS. Patient resting in bed with eyes closed. She stated she is not ready to get dressed yet and is too sleepy.     0923-Pt sitting on the side of bed and tolerating well. IV removed.  at bedside and  helping the patient get dressed.     0930-Pt discharged home in stable condition. Taken to the car via wheelchair and home with family. All belongings sent.

## 2025-07-29 NOTE — PRE SEDATION
[facility]  Pre-Sedation/Analgesia History & Physical    Pt Name: Ivon Russ  MRN: [id number]  YOB: 1980  Provider Performing Procedure: Cristian Meade DO   Primary Care Physician: Katherine Peterson MD      MEDICAL HISTORY       has a past medical history of Asthma, Depression, High blood pressure, Irregular heart rate, and Migraines.  SURGICAL HISTORY   has a past surgical history that includes  section; knee surgery (Left); Tubal ligation; Back Injection (Bilateral, 2022); Pain management procedure (Bilateral, 2023); lumbar fusion (N/A, 2023); Lumbar disc surgery (2023); and Urethral Surgery (N/A, 2023).    ALLERGIES   Allergies as of 2025    (No Known Allergies)       MEDICATIONS   Prior to Admission medications    Medication Sig Start Date End Date Taking? Authorizing Provider   budesonide-formoterol (SYMBICORT) 160-4.5 MCG/ACT AERO Inhale 2 puffs into the lungs 2 times daily 25  Yes Crystal Parkinson APRN - CNP   albuterol sulfate HFA (VENTOLIN HFA) 108 (90 Base) MCG/ACT inhaler Inhale 2 puffs into the lungs 4 times daily as needed for Wheezing or Shortness of Breath 25  Yes Crystal Parkinson APRN - CNP   pregabalin (LYRICA) 200 MG capsule Take 1 capsule by mouth at bedtime for 90 days. Max Daily Amount: 200 mg 25 Yes Joan Bales APRN - CNP   pregabalin (LYRICA) 100 MG capsule Take 1 capsule by mouth every morning (before breakfast) for 90 days. Max Daily Amount: 100 mg 25 Yes Joan Bales APRN - CNP   cetirizine (ZYRTEC) 10 MG tablet Take 1 tablet by mouth nightly 25  Yes Crystal Parkinson APRN - CNP   lisinopril (PRINIVIL;ZESTRIL) 5 MG tablet Take 1 tablet by mouth daily as needed (PCP wants her to take if BP goes above 140?)   Yes Provider, MD Kaden   HYDROcodone-acetaminophen (NORCO) 5-325 MG per tablet Take 1 tablet by mouth every 6 hours as needed for Pain.   Yes Provider,

## 2025-08-12 ENCOUNTER — CLINICAL SUPPORT (OUTPATIENT)
Dept: ALLERGY | Age: 45
End: 2025-08-12

## 2025-08-12 VITALS
DIASTOLIC BLOOD PRESSURE: 85 MMHG | SYSTOLIC BLOOD PRESSURE: 125 MMHG | RESPIRATION RATE: 18 BRPM | OXYGEN SATURATION: 98 % | HEART RATE: 81 BPM

## 2025-08-12 DIAGNOSIS — J30.9 CHRONIC ALLERGIC RHINITIS: ICD-10-CM

## 2025-08-12 DIAGNOSIS — J30.1 SEASONAL ALLERGIC RHINITIS DUE TO POLLEN: Primary | ICD-10-CM

## 2025-08-14 ENCOUNTER — CLINICAL SUPPORT (OUTPATIENT)
Dept: ALLERGY | Age: 45
End: 2025-08-14

## 2025-08-14 VITALS
DIASTOLIC BLOOD PRESSURE: 91 MMHG | OXYGEN SATURATION: 98 % | SYSTOLIC BLOOD PRESSURE: 123 MMHG | RESPIRATION RATE: 18 BRPM | HEART RATE: 85 BPM

## 2025-08-14 DIAGNOSIS — J30.1 SEASONAL ALLERGIC RHINITIS DUE TO POLLEN: Primary | ICD-10-CM

## 2025-08-14 DIAGNOSIS — J30.9 CHRONIC ALLERGIC RHINITIS: ICD-10-CM

## 2025-08-19 ENCOUNTER — CLINICAL SUPPORT (OUTPATIENT)
Dept: ALLERGY | Age: 45
End: 2025-08-19
Payer: COMMERCIAL

## 2025-08-19 VITALS
OXYGEN SATURATION: 98 % | SYSTOLIC BLOOD PRESSURE: 113 MMHG | DIASTOLIC BLOOD PRESSURE: 78 MMHG | HEART RATE: 72 BPM | RESPIRATION RATE: 18 BRPM

## 2025-08-19 DIAGNOSIS — J30.1 SEASONAL ALLERGIC RHINITIS DUE TO POLLEN: Primary | ICD-10-CM

## 2025-08-19 DIAGNOSIS — J30.9 CHRONIC ALLERGIC RHINITIS: ICD-10-CM

## 2025-08-19 PROCEDURE — 95117 IMMUNOTHERAPY INJECTIONS: CPT | Performed by: NURSE PRACTITIONER

## 2025-08-25 ENCOUNTER — CLINICAL SUPPORT (OUTPATIENT)
Dept: ALLERGY | Age: 45
End: 2025-08-25
Payer: COMMERCIAL

## 2025-08-25 VITALS
RESPIRATION RATE: 18 BRPM | DIASTOLIC BLOOD PRESSURE: 76 MMHG | SYSTOLIC BLOOD PRESSURE: 119 MMHG | OXYGEN SATURATION: 98 % | HEART RATE: 72 BPM

## 2025-08-25 DIAGNOSIS — J30.9 CHRONIC ALLERGIC RHINITIS: ICD-10-CM

## 2025-08-25 DIAGNOSIS — J30.1 SEASONAL ALLERGIC RHINITIS DUE TO POLLEN: Primary | ICD-10-CM

## 2025-08-25 PROCEDURE — 95117 IMMUNOTHERAPY INJECTIONS: CPT | Performed by: NURSE PRACTITIONER

## 2025-08-28 ENCOUNTER — CLINICAL SUPPORT (OUTPATIENT)
Dept: ALLERGY | Age: 45
End: 2025-08-28
Payer: COMMERCIAL

## 2025-08-28 VITALS
OXYGEN SATURATION: 98 % | RESPIRATION RATE: 18 BRPM | HEART RATE: 74 BPM | DIASTOLIC BLOOD PRESSURE: 77 MMHG | SYSTOLIC BLOOD PRESSURE: 107 MMHG

## 2025-08-28 DIAGNOSIS — J30.9 CHRONIC ALLERGIC RHINITIS: ICD-10-CM

## 2025-08-28 DIAGNOSIS — J30.1 SEASONAL ALLERGIC RHINITIS DUE TO POLLEN: Primary | ICD-10-CM

## 2025-08-28 PROCEDURE — 95117 IMMUNOTHERAPY INJECTIONS: CPT | Performed by: NURSE PRACTITIONER

## 2025-09-02 ENCOUNTER — CLINICAL SUPPORT (OUTPATIENT)
Dept: ALLERGY | Age: 45
End: 2025-09-02
Payer: COMMERCIAL

## 2025-09-02 VITALS
HEART RATE: 62 BPM | RESPIRATION RATE: 18 BRPM | DIASTOLIC BLOOD PRESSURE: 78 MMHG | SYSTOLIC BLOOD PRESSURE: 117 MMHG | OXYGEN SATURATION: 99 %

## 2025-09-02 DIAGNOSIS — J30.1 SEASONAL ALLERGIC RHINITIS DUE TO POLLEN: Primary | ICD-10-CM

## 2025-09-02 DIAGNOSIS — J30.9 CHRONIC ALLERGIC RHINITIS: ICD-10-CM

## 2025-09-02 PROCEDURE — 95117 IMMUNOTHERAPY INJECTIONS: CPT | Performed by: NURSE PRACTITIONER

## 2025-09-02 RX ORDER — ORPHENADRINE CITRATE 100 MG/1
100 TABLET ORAL 2 TIMES DAILY
COMMUNITY
Start: 2025-08-05

## 2025-09-04 ENCOUNTER — CLINICAL SUPPORT (OUTPATIENT)
Dept: ALLERGY | Age: 45
End: 2025-09-04
Payer: COMMERCIAL

## 2025-09-04 VITALS
SYSTOLIC BLOOD PRESSURE: 118 MMHG | HEART RATE: 68 BPM | OXYGEN SATURATION: 98 % | RESPIRATION RATE: 18 BRPM | DIASTOLIC BLOOD PRESSURE: 80 MMHG

## 2025-09-04 DIAGNOSIS — J30.1 SEASONAL ALLERGIC RHINITIS DUE TO POLLEN: Primary | ICD-10-CM

## 2025-09-04 DIAGNOSIS — J30.9 CHRONIC ALLERGIC RHINITIS: ICD-10-CM

## 2025-09-04 PROCEDURE — 95117 IMMUNOTHERAPY INJECTIONS: CPT | Performed by: NURSE PRACTITIONER

## (undated) DEVICE — MARKER,SKIN,WI/RULER AND LABELS: Brand: MEDLINE

## (undated) DEVICE — SUTURE NRLN SZ 4-0 L18IN NONABSORBABLE BLK L13MM TF 1/2 CIR C584D

## (undated) DEVICE — NEEDLE SPNL L3.5IN PNK HUB S STL REG WALL FIT STYL W/ QNCKE

## (undated) DEVICE — 35 ML SYRINGE LUER-LOCK TIP: Brand: MONOJECT

## (undated) DEVICE — DISPOSABLE DRAPE, STERILE, FOR A CDS-3060 5 FOOT TABLE: Brand: PEDIGO PRODUCTS, INC.

## (undated) DEVICE — WAX SURG 2.5GM HEMSTAT BNE BEESWAX PARAFFIN ISO PALMITATE

## (undated) DEVICE — PACK-MAJOR

## (undated) DEVICE — SUTURE VCRL + SZ 2-0 L27IN ABSRB CLR CT-1 1/2 CIR TAPERCUT VCP259H

## (undated) DEVICE — SPONGE GZ W4XL4IN COT 12 PLY TYP VII WVN C FLD DSGN STERILE

## (undated) DEVICE — SUTURE VCRL + SZ 0 L27IN ABSRB VLT L26MM UR-6 5/8 CIR VCP603H

## (undated) DEVICE — BASIC SINGLE BASIN BTC-LF: Brand: MEDLINE INDUSTRIES, INC.

## (undated) DEVICE — SYRINGE MED 10ML TRNSLUC BRL PLUNG BLK MRK POLYPR CTRL

## (undated) DEVICE — 1840 FOAM BLOCK NEEDLE COUNTER: Brand: DEVON

## (undated) DEVICE — GLOVE ORANGE PI 8   MSG9080

## (undated) DEVICE — SYRINGE MED 10ML LUERLOCK TIP W/O SFTY DISP

## (undated) DEVICE — 6 ML SYRINGE LUER-LOCK TIP: Brand: MONOJECT

## (undated) DEVICE — NEEDLE EPI L3.5IN OD20GA CLR POLYCARB HUB WNG N DEHP TUOHY

## (undated) DEVICE — TUBING, SUCTION, 1/4" X 20', STRAIGHT: Brand: MEDLINE INDUSTRIES, INC.

## (undated) DEVICE — TOWEL,OR,DSP,ST,BLUE,STD,4/PK,20PK/CS: Brand: MEDLINE

## (undated) DEVICE — PACK,UNIVERSAL,NO GOWNS: Brand: MEDLINE

## (undated) DEVICE — GOWN,SIRUS,NONRNF,SETINSLV,XL,20/CS: Brand: MEDLINE

## (undated) DEVICE — BLADE ES L4IN INSUL EDGE

## (undated) DEVICE — 1010 S-DRAPE TOWEL DRAPE 10/BX: Brand: STERI-DRAPE™

## (undated) DEVICE — Y-TYPE TUR/BLADDER IRRIGATION SET, REGULATING CLAMP

## (undated) DEVICE — SOLUTION SURG PREP POV IOD 7.5% 4 OZ

## (undated) DEVICE — SYRINGE MEDICAL 3ML CLEAR PLASTIC STANDARD NON CONTROL LUERLOCK TIP DISPOSABLE

## (undated) DEVICE — SUTURE VCRL SZ 2-0 L18IN ABSRB VLT L26MM SH 1/2 CIR J775D

## (undated) DEVICE — 3M™ IOBAN™ 2 ANTIMICROBIAL INCISE DRAPE 6650EZ: Brand: IOBAN™ 2

## (undated) DEVICE — PACK PROCEDURE SURG SET UP SRMC

## (undated) DEVICE — CARBIDE MATCH HEAD

## (undated) DEVICE — AGENT HEMSTAT W2XL4IN OXIDIZED REGENERATED CELOS ABSRB SFT

## (undated) DEVICE — CORD,CAUTERY,BIPOLAR,STERILE: Brand: MEDLINE

## (undated) DEVICE — DRESSING ANITMICROBIAL FOAM OPTIFOAM POSTOP STRP 35 X 10 IN

## (undated) DEVICE — NEEDLE SPNL 22GA L3.5IN BLK HUB S STL REG WALL FIT STYL W/

## (undated) DEVICE — ELECTRODE PT RET AD L9FT HI MOIST COND ADH HYDRGEL CORDED

## (undated) DEVICE — SYRINGE MED 7ML PLAS LUERSLIP LOSS OF RESISTANCE EPILOR

## (undated) DEVICE — BAG,BANDED,W/RUBBERBAND,STERILE,30X36: Brand: MEDLINE

## (undated) DEVICE — GAUZE SPONGES,USP TYPE VII GAUZE, 12 PLY: Brand: CURITY

## (undated) DEVICE — HYPODERMIC SAFETY NEEDLE: Brand: MAGELLAN

## (undated) DEVICE — SURGIFOAM SPNG SZ 100

## (undated) DEVICE — STERILE-Z SURGICAL PATIENT COVERS CLEAR POLYETHYLENE STERILE UNIVERSAL FIT 10 PER CASE: Brand: STERILE-Z

## (undated) DEVICE — GLOVE ORANGE PI 8 1/2   MSG9085

## (undated) DEVICE — LIQUIBAND RAPID ADHESIVE 36/CS 0.8ML: Brand: MEDLINE

## (undated) DEVICE — TAPE SURG W4INXL11YD 2IN PERF LINERLESS NONWOVEN MEDFIX EZ

## (undated) DEVICE — GLOVE SURG SZ 85 L12IN THK75MIL DK GRN LTX FREE

## (undated) DEVICE — CODMAN® SURGICAL PATTIES 1/2" X 1/2" (1.27CM X 1.27CM): Brand: CODMAN®

## (undated) DEVICE — APPLICATOR MEDICATED 3 CC SOLUTION CLR STRL CHLORAPREP

## (undated) DEVICE — BAND RUBBER ST

## (undated) DEVICE — 6.0MM ROUND FLUTED SOFT TOUCH

## (undated) DEVICE — SURGIFOAM SPNG SZ 12-7

## (undated) DEVICE — INTENDED FOR TISSUE SEPARATION, AND OTHER PROCEDURES THAT REQUIRE A SHARP SURGICAL BLADE TO PUNCTURE OR CUT.: Brand: BARD-PARKER ® CARBON RIB-BACK BLADES

## (undated) DEVICE — C-ARMOR C-ARM EQUIPMENT COVERS CLEAR STERILE UNIVERSAL FIT 12 PER CASE: Brand: C-ARMOR

## (undated) DEVICE — PENCIL SMK EVAC ALL IN 1 DSGN ENH VISIBILITY IMPROVED AIR

## (undated) DEVICE — PROBE STIM 3 MM FOR PEDCL SCREW DISP

## (undated) DEVICE — CATHETER IV 14GA L1.25IN TEF STR HUB INTROCAN SFTY

## (undated) DEVICE — GLOVE ORANGE PI 7 1/2   MSG9075

## (undated) DEVICE — DRAPE,UNDERBUTTOCKS,PCH,STERILE: Brand: MEDLINE

## (undated) DEVICE — AGENT HEMOSTATIC SURGIFLOW MATRIX KIT W/THROMBIN

## (undated) DEVICE — MICRO TIP WIPE: Brand: DEVON

## (undated) DEVICE — IMPLANTABLE DEVICE: Type: IMPLANTABLE DEVICE | Site: SPINE LUMBAR | Status: NON-FUNCTIONAL

## (undated) DEVICE — 7" HEIGHT PRONE HEADREST. WITH COMFORT FOAM AND RIGHT SIDE INTUBATION SLOT: Brand: SOULE MEDICAL

## (undated) DEVICE — TTL1LYR 16FR10ML 100%SIL TMPST TR: Brand: MEDLINE

## (undated) DEVICE — NEEDLE HYPO 18GA L1.5IN THN WALL PIVOTING SHLD BVL ORIENTED

## (undated) DEVICE — APPLICATOR MEDICATED 10.5 CC SOLUTION CLR STRL CHLORAPREP

## (undated) DEVICE — PACK,LAPAROSCOPY,PK II,SIRUS: Brand: MEDLINE

## (undated) DEVICE — APPLICATOR MEDICATED 26 CC SOLUTION HI LT ORNG CHLORAPREP

## (undated) DEVICE — CODMAN® SURGICAL PATTIES 1/2" X1 1/2" (1.27CM X 3.81CM): Brand: CODMAN®

## (undated) DEVICE — DRAPE MICROSCOPE 54 IN X 120 IN

## (undated) DEVICE — OIL CARTRIDGE: Brand: CORE, MAESTRO

## (undated) DEVICE — LINE ASPIR 1/4 ANTICOAG

## (undated) DEVICE — SUTURE PERMAHAND SZ 2-0 L30IN NONABSORBABLE BLK L17MM BB K883H

## (undated) DEVICE — GLOVE BIOGEL POWDER FREE SZ 8

## (undated) DEVICE — TOTAL TRAY, DB, 100% SILI FOLEY, 16FR 10: Brand: MEDLINE

## (undated) DEVICE — SUTURE VCRL SZ 0 L45CM ABSRB VLT OS-6 L36.4MM 1/2 CIR REV J711T

## (undated) DEVICE — DIFFUSER: Brand: CORE, MAESTRO

## (undated) DEVICE — SUTURE VCRL + SZ 2-0 L27IN ABSRB WHT SH 1/2 CIR TAPERCUT VCP417H

## (undated) DEVICE — SC PAIN PACK: Brand: MEDLINE INDUSTRIES, INC.

## (undated) DEVICE — 3M™ STERI-DRAPE™ INSTRUMENT POUCH 1018: Brand: STERI-DRAPE™

## (undated) DEVICE — GAUZE,SPONGE,8"X4",12PLY,XRAY,STRL,LF: Brand: MEDLINE

## (undated) DEVICE — Device

## (undated) DEVICE — KIT EVAC 400CC PVC RADPQ Y CONN

## (undated) DEVICE — 3 ML SYRINGE LUER-LOCK TIP: Brand: MONOJECT

## (undated) DEVICE — DRAIN SURG 10FR PVC TB W/ TRCR MID PERF NO RESVR HUBLESS

## (undated) DEVICE — BIPOLAR SEALER 23-112-1 AQM 6.0: Brand: AQUAMANTYS ®

## (undated) DEVICE — 450 ML BOTTLE OF 0.05% CHLORHEXIDINE GLUCONATE IN 99.95% STERILE WATER FOR IRRIGATION, USP AND APPLICATOR.: Brand: IRRISEPT ANTIMICROBIAL WOUND LAVAGE

## (undated) DEVICE — PAD,SANITARY,11 IN,MAXI,W/WINGS,N-STRL: Brand: MEDLINE

## (undated) DEVICE — INSTRUMENT BATTERY

## (undated) DEVICE — DRESSING TRNSPAR W5XL4.5IN FLM SHT SEMIPERMEABLE WIND